# Patient Record
Sex: FEMALE | Employment: UNEMPLOYED | URBAN - METROPOLITAN AREA
[De-identification: names, ages, dates, MRNs, and addresses within clinical notes are randomized per-mention and may not be internally consistent; named-entity substitution may affect disease eponyms.]

---

## 2017-01-09 ENCOUNTER — TRANSFERRED RECORDS (OUTPATIENT)
Dept: HEALTH INFORMATION MANAGEMENT | Facility: CLINIC | Age: 22
End: 2017-01-09

## 2017-01-09 LAB
HIV 1&2 EXT: NORMAL
PAP-ABSTRACT: NORMAL

## 2019-07-17 ENCOUNTER — APPOINTMENT (OUTPATIENT)
Dept: CT IMAGING | Facility: CLINIC | Age: 24
End: 2019-07-17
Attending: EMERGENCY MEDICINE

## 2019-07-17 ENCOUNTER — HOSPITAL ENCOUNTER (EMERGENCY)
Facility: CLINIC | Age: 24
Discharge: HOME | End: 2019-07-17
Attending: EMERGENCY MEDICINE | Admitting: EMERGENCY MEDICINE

## 2019-07-17 VITALS
OXYGEN SATURATION: 97 % | HEIGHT: 66 IN | HEART RATE: 53 BPM | RESPIRATION RATE: 17 BRPM | TEMPERATURE: 99.4 F | WEIGHT: 220 LBS | DIASTOLIC BLOOD PRESSURE: 76 MMHG | BODY MASS INDEX: 35.36 KG/M2 | SYSTOLIC BLOOD PRESSURE: 137 MMHG

## 2019-07-17 DIAGNOSIS — K50.919 CROHN'S DISEASE WITH COMPLICATION, UNSPECIFIED GASTROINTESTINAL TRACT LOCATION (H): ICD-10-CM

## 2019-07-17 DIAGNOSIS — R10.84 ABDOMINAL PAIN, GENERALIZED: ICD-10-CM

## 2019-07-17 LAB
ALBUMIN SERPL-MCNC: 4 G/DL (ref 3.4–5)
ALBUMIN UR-MCNC: 10 MG/DL
ALP SERPL-CCNC: 128 U/L (ref 40–150)
ALT SERPL W P-5'-P-CCNC: 25 U/L (ref 0–50)
ANION GAP SERPL CALCULATED.3IONS-SCNC: 7 MMOL/L (ref 3–14)
APPEARANCE UR: CLEAR
AST SERPL W P-5'-P-CCNC: 12 U/L (ref 0–45)
BASOPHILS # BLD AUTO: 0 10E9/L (ref 0–0.2)
BASOPHILS NFR BLD AUTO: 0 %
BILIRUB SERPL-MCNC: 0.8 MG/DL (ref 0.2–1.3)
BILIRUB UR QL STRIP: NEGATIVE
BUN SERPL-MCNC: 9 MG/DL (ref 7–30)
CALCIUM SERPL-MCNC: 9.5 MG/DL (ref 8.5–10.1)
CHLORIDE SERPL-SCNC: 108 MMOL/L (ref 94–109)
CO2 SERPL-SCNC: 25 MMOL/L (ref 20–32)
COLOR UR AUTO: YELLOW
CREAT SERPL-MCNC: 0.71 MG/DL (ref 0.52–1.04)
DIFFERENTIAL METHOD BLD: NORMAL
EOSINOPHIL # BLD AUTO: 0 10E9/L (ref 0–0.7)
EOSINOPHIL NFR BLD AUTO: 0 %
ERYTHROCYTE [DISTWIDTH] IN BLOOD BY AUTOMATED COUNT: 14.6 % (ref 10–15)
GFR SERPL CREATININE-BSD FRML MDRD: >90 ML/MIN/{1.73_M2}
GLUCOSE SERPL-MCNC: 108 MG/DL (ref 70–99)
GLUCOSE UR STRIP-MCNC: NEGATIVE MG/DL
HCT VFR BLD AUTO: 37.9 % (ref 35–47)
HGB BLD-MCNC: 12.5 G/DL (ref 11.7–15.7)
HGB UR QL STRIP: NEGATIVE
IMM GRANULOCYTES # BLD: 0 10E9/L (ref 0–0.4)
IMM GRANULOCYTES NFR BLD: 0.3 %
KETONES UR STRIP-MCNC: NEGATIVE MG/DL
LEUKOCYTE ESTERASE UR QL STRIP: NEGATIVE
LIPASE SERPL-CCNC: 60 U/L (ref 73–393)
LYMPHOCYTES # BLD AUTO: 1.1 10E9/L (ref 0.8–5.3)
LYMPHOCYTES NFR BLD AUTO: 12.1 %
MCH RBC QN AUTO: 28.2 PG (ref 26.5–33)
MCHC RBC AUTO-ENTMCNC: 33 G/DL (ref 31.5–36.5)
MCV RBC AUTO: 85 FL (ref 78–100)
MONOCYTES # BLD AUTO: 0.4 10E9/L (ref 0–1.3)
MONOCYTES NFR BLD AUTO: 4.2 %
MUCOUS THREADS #/AREA URNS LPF: PRESENT /LPF
NEUTROPHILS # BLD AUTO: 7.9 10E9/L (ref 1.6–8.3)
NEUTROPHILS NFR BLD AUTO: 83.4 %
NITRATE UR QL: NEGATIVE
NRBC # BLD AUTO: 0 10*3/UL
NRBC BLD AUTO-RTO: 0 /100
PH UR STRIP: 6.5 PH (ref 5–7)
PLATELET # BLD AUTO: 322 10E9/L (ref 150–450)
POTASSIUM SERPL-SCNC: 4 MMOL/L (ref 3.4–5.3)
PROT SERPL-MCNC: 8 G/DL (ref 6.8–8.8)
RBC # BLD AUTO: 4.44 10E12/L (ref 3.8–5.2)
RBC #/AREA URNS AUTO: 1 /HPF (ref 0–2)
SODIUM SERPL-SCNC: 140 MMOL/L (ref 133–144)
SOURCE: ABNORMAL
SP GR UR STRIP: 1.02 (ref 1–1.03)
SQUAMOUS #/AREA URNS AUTO: 2 /HPF (ref 0–1)
UROBILINOGEN UR STRIP-MCNC: 2 MG/DL (ref 0–2)
WBC # BLD AUTO: 9.4 10E9/L (ref 4–11)
WBC #/AREA URNS AUTO: <1 /HPF (ref 0–5)

## 2019-07-17 PROCEDURE — 25500064 ZZH RX 255 OP 636: Performed by: EMERGENCY MEDICINE

## 2019-07-17 PROCEDURE — 99285 EMERGENCY DEPT VISIT HI MDM: CPT | Mod: 25

## 2019-07-17 PROCEDURE — 87506 IADNA-DNA/RNA PROBE TQ 6-11: CPT | Performed by: EMERGENCY MEDICINE

## 2019-07-17 PROCEDURE — 96375 TX/PRO/DX INJ NEW DRUG ADDON: CPT

## 2019-07-17 PROCEDURE — 83690 ASSAY OF LIPASE: CPT | Performed by: EMERGENCY MEDICINE

## 2019-07-17 PROCEDURE — 74177 CT ABD & PELVIS W/CONTRAST: CPT

## 2019-07-17 PROCEDURE — 25800030 ZZH RX IP 258 OP 636: Performed by: EMERGENCY MEDICINE

## 2019-07-17 PROCEDURE — 80053 COMPREHEN METABOLIC PANEL: CPT | Performed by: EMERGENCY MEDICINE

## 2019-07-17 PROCEDURE — 25000125 ZZHC RX 250: Performed by: EMERGENCY MEDICINE

## 2019-07-17 PROCEDURE — 96361 HYDRATE IV INFUSION ADD-ON: CPT

## 2019-07-17 PROCEDURE — 85025 COMPLETE CBC W/AUTO DIFF WBC: CPT | Performed by: EMERGENCY MEDICINE

## 2019-07-17 PROCEDURE — 96374 THER/PROPH/DIAG INJ IV PUSH: CPT | Mod: 59

## 2019-07-17 PROCEDURE — 25000128 H RX IP 250 OP 636: Performed by: EMERGENCY MEDICINE

## 2019-07-17 PROCEDURE — 81001 URINALYSIS AUTO W/SCOPE: CPT | Performed by: EMERGENCY MEDICINE

## 2019-07-17 RX ORDER — ONDANSETRON 2 MG/ML
4 INJECTION INTRAMUSCULAR; INTRAVENOUS EVERY 30 MIN PRN
Status: DISCONTINUED | OUTPATIENT
Start: 2019-07-17 | End: 2019-07-18 | Stop reason: HOSPADM

## 2019-07-17 RX ORDER — HYDROMORPHONE HYDROCHLORIDE 1 MG/ML
0.5 INJECTION, SOLUTION INTRAMUSCULAR; INTRAVENOUS; SUBCUTANEOUS
Status: COMPLETED | OUTPATIENT
Start: 2019-07-17 | End: 2019-07-17

## 2019-07-17 RX ORDER — SODIUM CHLORIDE 9 MG/ML
1000 INJECTION, SOLUTION INTRAVENOUS CONTINUOUS
Status: DISCONTINUED | OUTPATIENT
Start: 2019-07-17 | End: 2019-07-18 | Stop reason: HOSPADM

## 2019-07-17 RX ORDER — PROMETHAZINE HYDROCHLORIDE 25 MG/1
25 SUPPOSITORY RECTAL EVERY 6 HOURS PRN
Qty: 10 SUPPOSITORY | Refills: 0 | Status: SHIPPED | OUTPATIENT
Start: 2019-07-17 | End: 2020-08-25

## 2019-07-17 RX ORDER — ONDANSETRON 4 MG/1
4 TABLET, ORALLY DISINTEGRATING ORAL EVERY 4 HOURS PRN
Qty: 10 TABLET | Refills: 0 | Status: ON HOLD | OUTPATIENT
Start: 2019-07-17 | End: 2019-07-29

## 2019-07-17 RX ADMIN — ONDANSETRON 4 MG: 2 INJECTION INTRAMUSCULAR; INTRAVENOUS at 20:41

## 2019-07-17 RX ADMIN — HYDROMORPHONE HYDROCHLORIDE 0.5 MG: 1 INJECTION, SOLUTION INTRAMUSCULAR; INTRAVENOUS; SUBCUTANEOUS at 20:41

## 2019-07-17 RX ADMIN — SODIUM CHLORIDE 1000 ML: 9 INJECTION, SOLUTION INTRAVENOUS at 20:41

## 2019-07-17 RX ADMIN — SODIUM CHLORIDE 75 ML: 9 INJECTION, SOLUTION INTRAVENOUS at 22:09

## 2019-07-17 RX ADMIN — PROMETHAZINE HYDROCHLORIDE 25 MG: 25 INJECTION INTRAMUSCULAR; INTRAVENOUS at 21:51

## 2019-07-17 RX ADMIN — SODIUM CHLORIDE 1000 ML: 9 INJECTION, SOLUTION INTRAVENOUS at 21:50

## 2019-07-17 RX ADMIN — IOHEXOL 117 ML: 350 INJECTION, SOLUTION INTRAVENOUS at 22:11

## 2019-07-17 ASSESSMENT — ENCOUNTER SYMPTOMS
NAUSEA: 1
DIARRHEA: 1
ABDOMINAL PAIN: 1
VOMITING: 1

## 2019-07-17 ASSESSMENT — MIFFLIN-ST. JEOR: SCORE: 1769.66

## 2019-07-17 NOTE — ED AVS SNAPSHOT
Emergency Department  [address] [place] [address]  Phone:  [phone]  Fax:  [phone]                                    Tami Almaraz   MRN: [id number]    Department:   Emergency Department   Date of Visit:  7/17/2019           After Visit Summary Signature Page    I have received my discharge instructions, and my questions have been answered. I have discussed any challenges I see with this plan with the nurse or doctor.    ..........................................................................................................................................  Patient/Patient Representative Signature      ..........................................................................................................................................  Patient Representative Print Name and Relationship to Patient    ..................................................               ................................................  Date                                   Time    ..........................................................................................................................................  Reviewed by Signature/Title    ...................................................              ..............................................  Date                                               Time          22EPIC Rev 08/18

## 2019-07-18 NOTE — RESULT ENCOUNTER NOTE
Final Enteric Bacteria and Virus Panel by [facility] Stool is NEGATIVE for Campylobacter group, Salmonella species, Shigella species, Shiga toxin 1 gene, Shiga toxin 2 gene, Vibrio group,  Yersinia enterocolitica,  Rotavirus A,  and Norovirus I and II.    No treatment or change in treatment per [facility] Lab Result protocol.

## 2019-07-18 NOTE — ED PROVIDER NOTES
History     Chief Complaint:  Abdominal Pain      HPI   Tami Almaraz is a 23 year old female with history of Crohn's disease not currently medicated who presents to the emergency department today for evaluation of abdominal pain. The patient reports for the past week she has had upper abdominal pain, nausea, vomiting, and diarrhea. She says she has ten episodes of diarrhea per day. She reports she hasn't been able to keep any fluids down, or her Tylenol. She says this is worse than her regular bouts with Crohn's disease. Due to these worsening symptoms she presented to the emergency department today. Additionally, she reports she recently had a child  but no other surgeries. Her last menstrual cycles ended yesterday which is regular timing for her. She denies vaginal discharge. Of note, she recently moved to [place] from [place].       Allergies:  Compazine [Prochlorperazine]  Morphine  Reglan [Metoclopramide]  Toradol [Ketorolac]  Vicodin [Hydrocodone-Acetaminophen]  Zyprexa [Olanzapine]        Medications:    The patient is currently on no regular medications.     Past Medical History:    Crohn's disease    Heavy set white supein, uncomret  orophary is moist  Belly: diffuse upper abdom tenrn, no guar no reboun, nOvv ea 1+femoral pulse     Past Surgical History:    C section    Family History:    History reviewed. No pertinent family history.        Social History:  The patient was accompanied to the ED by herself.  Smoking Status: Never Smoker  Smokeless Tobacco: Never Used  Alcohol Use: Not currently   Marital Status:  Single       Review of Systems   Gastrointestinal: Positive for abdominal pain (upper), diarrhea, nausea and vomiting.   Genitourinary: Negative for vaginal discharge.   All other systems reviewed and are negative.        Physical Exam     Patient Vitals for the past 24 hrs:   BP Temp Temp src Pulse Resp SpO2 Height Weight   19 2125 (!) 135/91 -- -- 51 -- 98 % -- --   19  "132/87 99.4  F (37.4  C) Oral 59 17 99 % 1.676 m (5' 6\") 99.8 kg (220 lb)        Physical Exam  Constitutional: Heavy set, white female, supine. Uncomfortable appearing.   HENT: Oropharynx is moist. No signs of trauma.   Eyes: EOM are normal. Pupils are equal, round, and reactive to light.   Neck: Normal range of motion. No JVD present. No cervical adenopathy.  Cardiovascular: Regular rhythm.  Exam reveals no gallop and no friction rub.    No murmur heard.  Pulmonary/Chest: 1+ Femoral pulse. Bilateral breath sounds normal. No wheezes, rhonchi or rales.  Abdominal: Diffuse upper abdominal tenderness. Soft. No rebound or guarding. bowel sounds active  Musculoskeletal: No edema. No tenderness.   Lymphadenopathy: No lymphadenopathy.   Neurological: Alert and oriented to person, place, and time. Normal strength. Coordination normal.   Skin: Skin is warm and dry. No rash noted. No erythema.      Emergency Department Course     Imaging:  Radiology findings were communicated with the patient who voiced understanding of the findings.    CT Abdomen/Pelvis w Contrast:  IMPRESSION: No cause of acute pain identified in the abdomen or  pelvis. The appendix is unremarkable  Report per radiology       Laboratory:  Laboratory findings were communicated with the patient who voiced understanding of the findings.    CBC: WBC 9.4, HGB 12.5,    CMP: Glucose 108   Lipase: 60     UA: Yellow and Clear. Protein Albumin Urine 10, WBC/HPF <1, RBC/HPF 1, Squamous Epithelial/HPF Urine 2, Mucous urine Present o/w WNL      Enteric Bacteria and Virus Panel by [facility] Stool: Pending      Interventions:  2041 Zofran 4mg IV   2041 Hydromorphone 0.5 mg IV  2151 Phenergan 25 mg IV         Emergency Department Course:  Nursing notes and vitals reviewed.  2028: I performed an exam of the patient as documented above.    IV was inserted and blood was drawn for laboratory testing, results above.    The patient provided a urine sample here in the emergency " "department. This was sent for laboratory testing, findings above.    The patient was sent for a CT Abdomen Pelvis while in the emergency department, results above.      2300 Patient rechecked and updated.      Findings and plan explained to the Patient. Patient discharged home with instructions regarding supportive care, medications, and reasons to return. The importance of close follow-up was reviewed.   I personally reviewed the laboratory and imaging results with the Patient and answered all related questions prior to discharge.   Impression & Plan      Medical Decision Making:  This is a 23 year old female who presents with abdominal pain. She states for at least a week she has been having abdominal pain more in the upper area, nausea, and vomiting, and unable to keep anything down including her Tylenol medication. She states she has had perfuse diarrhea so many times a day that she can't count. She is two months after delivery of her baby, and she states she has known Crohn's disease, and has never had surgery for this and isn't currently on medication. She just moved here from [place] one month ago. On my exam, there is some diffuse upper abdominal tenderness but no guarding or rebound to suggest a surgical abdomen. Patient holds an emesis bag, and yet we haven't seen any vomiting. She also states she has perfuse diarrhea, but only went to the bathroom once overly the several hours she was here. Patient has a long list of allergies to medications. She states she is able to tolerate phenergan and only the \"D\" pain medicine works for her. Patient did recieve IV fluids, Zofran, phenergan, and a single dose of dilaudid. Patient continued to complain of some nausea and discomfort, and was only able to take some ice chips. However, her entire workup including blood, urine, and CT scan are unremarkable. This well could be some type of vomiting and diarrhea, however given the inconsistency in what she is reporting and " what we are observing, an her specificity in request a certain medication I am concerned about drug seeking. I have told patient at this time there is nothing more we can do. She isn't dehydrated, she isn't septic, she doesn't have any intraabdominal problems. I will give her prescriptions phenergan suppository and oral dissolving Zofran, I referred her to Dr. Rouse. She will make a follow up, and will return to emergency department if symptoms worsen.    Diagnosis:    ICD-10-CM    1. Abdominal pain, generalized R10.84    2. Crohn's disease with complication, unspecified gastrointestinal tract location (H) K50.919        Disposition:  Discharged to home with the below prescription.     Discharge Medications:     Medication List      Started    ondansetron 4 MG ODT tab  Commonly known as:  ZOFRAN ODT  4 mg, Oral, EVERY 4 HOURS PRN     promethazine 25 MG suppository  Commonly known as:  PHENERGAN  25 mg, Rectal, EVERY 6 HOURS PRN            Scribe Disclosure:  I, Carly Ashton, am serving as a scribe at 8:33 PM on 7/17/2019 to document services personally performed by Charlie Nguyen MD based on my observations and the provider's statements to me.    Carly Ashton  7/17/2019    EMERGENCY DEPARTMENT       Charlie Nguyen MD  07/17/19 [id number]

## 2019-07-28 ENCOUNTER — HOSPITAL ENCOUNTER (OUTPATIENT)
Facility: CLINIC | Age: 24
Setting detail: OBSERVATION
Discharge: HOME | End: 2019-07-29
Attending: EMERGENCY MEDICINE | Admitting: FAMILY MEDICINE

## 2019-07-28 DIAGNOSIS — R10.9 ABDOMINAL PAIN, UNSPECIFIED ABDOMINAL LOCATION: Primary | ICD-10-CM

## 2019-07-28 DIAGNOSIS — R10.84 ABDOMINAL PAIN, GENERALIZED: ICD-10-CM

## 2019-07-28 DIAGNOSIS — K50.90 CROHN'S DISEASE WITHOUT COMPLICATION, UNSPECIFIED GASTROINTESTINAL TRACT LOCATION (H): ICD-10-CM

## 2019-07-28 LAB
ALBUMIN SERPL-MCNC: 3.8 G/DL (ref 3.4–5)
ALP SERPL-CCNC: 136 U/L (ref 40–150)
ALT SERPL W P-5'-P-CCNC: 39 U/L (ref 0–50)
ANION GAP SERPL CALCULATED.3IONS-SCNC: 4 MMOL/L (ref 3–14)
AST SERPL W P-5'-P-CCNC: 48 U/L (ref 0–45)
BASOPHILS # BLD AUTO: 0 10E9/L (ref 0–0.2)
BASOPHILS NFR BLD AUTO: 0.3 %
BILIRUB SERPL-MCNC: 0.8 MG/DL (ref 0.2–1.3)
BUN SERPL-MCNC: 7 MG/DL (ref 7–30)
CALCIUM SERPL-MCNC: 9 MG/DL (ref 8.5–10.1)
CHLORIDE SERPL-SCNC: 107 MMOL/L (ref 94–109)
CO2 SERPL-SCNC: 26 MMOL/L (ref 20–32)
CREAT SERPL-MCNC: 0.51 MG/DL (ref 0.52–1.04)
CRP SERPL-MCNC: 5.8 MG/L (ref 0–8)
DIFFERENTIAL METHOD BLD: ABNORMAL
EOSINOPHIL # BLD AUTO: 0 10E9/L (ref 0–0.7)
EOSINOPHIL NFR BLD AUTO: 0 %
ERYTHROCYTE [DISTWIDTH] IN BLOOD BY AUTOMATED COUNT: 14.1 % (ref 10–15)
ERYTHROCYTE [SEDIMENTATION RATE] IN BLOOD BY WESTERGREN METHOD: 16 MM/H (ref 0–20)
GFR SERPL CREATININE-BSD FRML MDRD: >90 ML/MIN/{1.73_M2}
GLUCOSE SERPL-MCNC: 113 MG/DL (ref 70–99)
HCG SERPL QL: NEGATIVE
HCT VFR BLD AUTO: 39.5 % (ref 35–47)
HGB BLD-MCNC: 12.3 G/DL (ref 11.7–15.7)
IMM GRANULOCYTES # BLD: 0 10E9/L (ref 0–0.4)
IMM GRANULOCYTES NFR BLD: 0.6 %
LACTATE BLD-SCNC: 0.9 MMOL/L (ref 0.7–2)
LIPASE SERPL-CCNC: 47 U/L (ref 73–393)
LYMPHOCYTES # BLD AUTO: 0.9 10E9/L (ref 0.8–5.3)
LYMPHOCYTES NFR BLD AUTO: 14.1 %
MCH RBC QN AUTO: 27.3 PG (ref 26.5–33)
MCHC RBC AUTO-ENTMCNC: 31.1 G/DL (ref 31.5–36.5)
MCV RBC AUTO: 88 FL (ref 78–100)
MONOCYTES # BLD AUTO: 0.1 10E9/L (ref 0–1.3)
MONOCYTES NFR BLD AUTO: 1.6 %
NEUTROPHILS # BLD AUTO: 5.3 10E9/L (ref 1.6–8.3)
NEUTROPHILS NFR BLD AUTO: 83.4 %
NRBC # BLD AUTO: 0 10*3/UL
NRBC BLD AUTO-RTO: 0 /100
PLATELET # BLD AUTO: 302 10E9/L (ref 150–450)
POTASSIUM SERPL-SCNC: 5.4 MMOL/L (ref 3.4–5.3)
PROT SERPL-MCNC: 7.8 G/DL (ref 6.8–8.8)
RBC # BLD AUTO: 4.51 10E12/L (ref 3.8–5.2)
SODIUM SERPL-SCNC: 136 MMOL/L (ref 133–144)
WBC # BLD AUTO: 6.3 10E9/L (ref 4–11)

## 2019-07-28 PROCEDURE — 25000128 H RX IP 250 OP 636: Performed by: EMERGENCY MEDICINE

## 2019-07-28 PROCEDURE — 83605 ASSAY OF LACTIC ACID: CPT | Performed by: EMERGENCY MEDICINE

## 2019-07-28 PROCEDURE — 80053 COMPREHEN METABOLIC PANEL: CPT | Performed by: EMERGENCY MEDICINE

## 2019-07-28 PROCEDURE — 85025 COMPLETE CBC W/AUTO DIFF WBC: CPT | Performed by: EMERGENCY MEDICINE

## 2019-07-28 PROCEDURE — 25800030 ZZH RX IP 258 OP 636: Performed by: EMERGENCY MEDICINE

## 2019-07-28 PROCEDURE — 99284 EMERGENCY DEPT VISIT MOD MDM: CPT | Mod: Z6 | Performed by: EMERGENCY MEDICINE

## 2019-07-28 PROCEDURE — 85652 RBC SED RATE AUTOMATED: CPT | Performed by: EMERGENCY MEDICINE

## 2019-07-28 PROCEDURE — 83690 ASSAY OF LIPASE: CPT | Performed by: EMERGENCY MEDICINE

## 2019-07-28 PROCEDURE — 99285 EMERGENCY DEPT VISIT HI MDM: CPT | Mod: 25 | Performed by: EMERGENCY MEDICINE

## 2019-07-28 PROCEDURE — 86140 C-REACTIVE PROTEIN: CPT | Performed by: EMERGENCY MEDICINE

## 2019-07-28 PROCEDURE — 96374 THER/PROPH/DIAG INJ IV PUSH: CPT | Performed by: EMERGENCY MEDICINE

## 2019-07-28 PROCEDURE — 84703 CHORIONIC GONADOTROPIN ASSAY: CPT | Performed by: EMERGENCY MEDICINE

## 2019-07-28 PROCEDURE — 96375 TX/PRO/DX INJ NEW DRUG ADDON: CPT | Performed by: EMERGENCY MEDICINE

## 2019-07-28 RX ORDER — ACETAMINOPHEN 500 MG
1000 TABLET ORAL ONCE
Status: DISCONTINUED | OUTPATIENT
Start: 2019-07-28 | End: 2019-07-29

## 2019-07-28 RX ORDER — SODIUM CHLORIDE, SODIUM LACTATE, POTASSIUM CHLORIDE, CALCIUM CHLORIDE 600; 310; 30; 20 MG/100ML; MG/100ML; MG/100ML; MG/100ML
INJECTION, SOLUTION INTRAVENOUS CONTINUOUS
Status: DISCONTINUED | OUTPATIENT
Start: 2019-07-28 | End: 2019-07-29

## 2019-07-28 RX ORDER — HYDROMORPHONE HYDROCHLORIDE 1 MG/ML
0.5 INJECTION, SOLUTION INTRAMUSCULAR; INTRAVENOUS; SUBCUTANEOUS
Status: COMPLETED | OUTPATIENT
Start: 2019-07-28 | End: 2019-07-28

## 2019-07-28 RX ORDER — ONDANSETRON 2 MG/ML
4 INJECTION INTRAMUSCULAR; INTRAVENOUS EVERY 30 MIN PRN
Status: DISCONTINUED | OUTPATIENT
Start: 2019-07-28 | End: 2019-07-29

## 2019-07-28 RX ADMIN — ONDANSETRON 4 MG: 2 INJECTION INTRAMUSCULAR; INTRAVENOUS at 23:16

## 2019-07-28 RX ADMIN — HYDROMORPHONE HYDROCHLORIDE 0.5 MG: 1 INJECTION, SOLUTION INTRAMUSCULAR; INTRAVENOUS; SUBCUTANEOUS at 23:16

## 2019-07-28 RX ADMIN — SODIUM CHLORIDE, POTASSIUM CHLORIDE, SODIUM LACTATE AND CALCIUM CHLORIDE: 600; 310; 30; 20 INJECTION, SOLUTION INTRAVENOUS at 23:15

## 2019-07-28 ASSESSMENT — MIFFLIN-ST. JEOR: SCORE: 1844.51

## 2019-07-29 ENCOUNTER — APPOINTMENT (OUTPATIENT)
Dept: ULTRASOUND IMAGING | Facility: CLINIC | Age: 24
End: 2019-07-29
Attending: EMERGENCY MEDICINE

## 2019-07-29 VITALS
RESPIRATION RATE: 16 BRPM | SYSTOLIC BLOOD PRESSURE: 117 MMHG | TEMPERATURE: 98.2 F | BODY MASS INDEX: 39.99 KG/M2 | WEIGHT: 240 LBS | HEART RATE: 56 BPM | HEIGHT: 65 IN | OXYGEN SATURATION: 96 % | DIASTOLIC BLOOD PRESSURE: 72 MMHG

## 2019-07-29 PROBLEM — R10.9 ABDOMINAL PAIN: Status: ACTIVE | Noted: 2019-07-29

## 2019-07-29 LAB
ALBUMIN SERPL-MCNC: 3.6 G/DL (ref 3.4–5)
ALBUMIN UR-MCNC: NEGATIVE MG/DL
ALP SERPL-CCNC: 116 U/L (ref 40–150)
ALT SERPL W P-5'-P-CCNC: 31 U/L (ref 0–50)
ANION GAP SERPL CALCULATED.3IONS-SCNC: 9 MMOL/L (ref 3–14)
APPEARANCE UR: CLEAR
AST SERPL W P-5'-P-CCNC: 15 U/L (ref 0–45)
BACTERIA #/AREA URNS HPF: ABNORMAL /HPF
BASOPHILS # BLD AUTO: 0 10E9/L (ref 0–0.2)
BASOPHILS NFR BLD AUTO: 0.3 %
BILIRUB SERPL-MCNC: 1.6 MG/DL (ref 0.2–1.3)
BILIRUB UR QL STRIP: NEGATIVE
BUN SERPL-MCNC: 7 MG/DL (ref 7–30)
C COLI+JEJUNI+LARI FUSA STL QL NAA+PROBE: NOT DETECTED
C DIFF TOX B STL QL: NEGATIVE
CALCIUM SERPL-MCNC: 8.9 MG/DL (ref 8.5–10.1)
CALPROTECTIN STL-MCNT: 42.8 MG/KG (ref 0–49.9)
CHLORIDE SERPL-SCNC: 106 MMOL/L (ref 94–109)
CO2 SERPL-SCNC: 25 MMOL/L (ref 20–32)
COLOR UR AUTO: ABNORMAL
CREAT SERPL-MCNC: 0.64 MG/DL (ref 0.52–1.04)
DIFFERENTIAL METHOD BLD: ABNORMAL
EC STX1 GENE STL QL NAA+PROBE: NOT DETECTED
EC STX2 GENE STL QL NAA+PROBE: NOT DETECTED
ENTERIC PATHOGEN COMMENT: NORMAL
EOSINOPHIL # BLD AUTO: 0 10E9/L (ref 0–0.7)
EOSINOPHIL NFR BLD AUTO: 0.1 %
ERYTHROCYTE [DISTWIDTH] IN BLOOD BY AUTOMATED COUNT: 14.2 % (ref 10–15)
GFR SERPL CREATININE-BSD FRML MDRD: >90 ML/MIN/{1.73_M2}
GLUCOSE SERPL-MCNC: 90 MG/DL (ref 70–99)
GLUCOSE UR STRIP-MCNC: NEGATIVE MG/DL
HCT VFR BLD AUTO: 36.3 % (ref 35–47)
HEMOCCULT STL QL: NEGATIVE
HGB BLD-MCNC: 11.4 G/DL (ref 11.7–15.7)
HGB UR QL STRIP: NEGATIVE
IMM GRANULOCYTES # BLD: 0 10E9/L (ref 0–0.4)
IMM GRANULOCYTES NFR BLD: 0.5 %
INTERNAL QC OK POCT: YES
KETONES UR STRIP-MCNC: NEGATIVE MG/DL
LEUKOCYTE ESTERASE UR QL STRIP: ABNORMAL
LYMPHOCYTES # BLD AUTO: 1.7 10E9/L (ref 0.8–5.3)
LYMPHOCYTES NFR BLD AUTO: 22 %
MCH RBC QN AUTO: 27.2 PG (ref 26.5–33)
MCHC RBC AUTO-ENTMCNC: 31.4 G/DL (ref 31.5–36.5)
MCV RBC AUTO: 87 FL (ref 78–100)
MONOCYTES # BLD AUTO: 0.5 10E9/L (ref 0–1.3)
MONOCYTES NFR BLD AUTO: 6.8 %
MUCOUS THREADS #/AREA URNS LPF: PRESENT /LPF
NEUTROPHILS # BLD AUTO: 5.3 10E9/L (ref 1.6–8.3)
NEUTROPHILS NFR BLD AUTO: 70.3 %
NITRATE UR QL: NEGATIVE
NOROV GI+II ORF1-ORF2 JNC STL QL NAA+PR: NOT DETECTED
NRBC # BLD AUTO: 0 10*3/UL
NRBC BLD AUTO-RTO: 0 /100
PH UR STRIP: 6.5 PH (ref 5–7)
PLATELET # BLD AUTO: 305 10E9/L (ref 150–450)
POTASSIUM SERPL-SCNC: 3.4 MMOL/L (ref 3.4–5.3)
PROT SERPL-MCNC: 7.1 G/DL (ref 6.8–8.8)
RBC # BLD AUTO: 4.19 10E12/L (ref 3.8–5.2)
RBC #/AREA URNS AUTO: <1 /HPF (ref 0–2)
RVA NSP5 STL QL NAA+PROBE: NOT DETECTED
SALMONELLA SP RPOD STL QL NAA+PROBE: NOT DETECTED
SHIGELLA SP+EIEC IPAH STL QL NAA+PROBE: NOT DETECTED
SODIUM SERPL-SCNC: 140 MMOL/L (ref 133–144)
SOURCE: ABNORMAL
SP GR UR STRIP: 1.01 (ref 1–1.03)
SPECIMEN SOURCE: NORMAL
SQUAMOUS #/AREA URNS AUTO: 4 /HPF (ref 0–1)
TEST CARD LOT NUMBER: NORMAL
TRANS CELLS #/AREA URNS HPF: <1 /HPF (ref 0–1)
UROBILINOGEN UR STRIP-MCNC: NORMAL MG/DL (ref 0–2)
V CHOL+PARA RFBL+TRKH+TNAA STL QL NAA+PR: NOT DETECTED
WBC # BLD AUTO: 7.5 10E9/L (ref 4–11)
WBC #/AREA URNS AUTO: <1 /HPF (ref 0–5)
Y ENTERO RECN STL QL NAA+PROBE: NOT DETECTED

## 2019-07-29 PROCEDURE — 25000132 ZZH RX MED GY IP 250 OP 250 PS 637: Performed by: EMERGENCY MEDICINE

## 2019-07-29 PROCEDURE — 83993 ASSAY FOR CALPROTECTIN FECAL: CPT | Performed by: STUDENT IN AN ORGANIZED HEALTH CARE EDUCATION/TRAINING PROGRAM

## 2019-07-29 PROCEDURE — 85025 COMPLETE CBC W/AUTO DIFF WBC: CPT | Performed by: STUDENT IN AN ORGANIZED HEALTH CARE EDUCATION/TRAINING PROGRAM

## 2019-07-29 PROCEDURE — 25000128 H RX IP 250 OP 636: Performed by: STUDENT IN AN ORGANIZED HEALTH CARE EDUCATION/TRAINING PROGRAM

## 2019-07-29 PROCEDURE — 25000132 ZZH RX MED GY IP 250 OP 250 PS 637: Performed by: STUDENT IN AN ORGANIZED HEALTH CARE EDUCATION/TRAINING PROGRAM

## 2019-07-29 PROCEDURE — G0378 HOSPITAL OBSERVATION PER HR: HCPCS

## 2019-07-29 PROCEDURE — 80053 COMPREHEN METABOLIC PANEL: CPT | Performed by: STUDENT IN AN ORGANIZED HEALTH CARE EDUCATION/TRAINING PROGRAM

## 2019-07-29 PROCEDURE — 87493 C DIFF AMPLIFIED PROBE: CPT | Performed by: EMERGENCY MEDICINE

## 2019-07-29 PROCEDURE — 96376 TX/PRO/DX INJ SAME DRUG ADON: CPT

## 2019-07-29 PROCEDURE — 82272 OCCULT BLD FECES 1-3 TESTS: CPT | Performed by: STUDENT IN AN ORGANIZED HEALTH CARE EDUCATION/TRAINING PROGRAM

## 2019-07-29 PROCEDURE — 81001 URINALYSIS AUTO W/SCOPE: CPT | Performed by: EMERGENCY MEDICINE

## 2019-07-29 PROCEDURE — 36415 COLL VENOUS BLD VENIPUNCTURE: CPT | Performed by: STUDENT IN AN ORGANIZED HEALTH CARE EDUCATION/TRAINING PROGRAM

## 2019-07-29 PROCEDURE — 87506 IADNA-DNA/RNA PROBE TQ 6-11: CPT | Performed by: STUDENT IN AN ORGANIZED HEALTH CARE EDUCATION/TRAINING PROGRAM

## 2019-07-29 PROCEDURE — 76705 ECHO EXAM OF ABDOMEN: CPT

## 2019-07-29 RX ORDER — AMOXICILLIN 250 MG
2 CAPSULE ORAL 2 TIMES DAILY PRN
Status: DISCONTINUED | OUTPATIENT
Start: 2019-07-29 | End: 2019-07-29 | Stop reason: HOSPADM

## 2019-07-29 RX ORDER — NALOXONE HYDROCHLORIDE 0.4 MG/ML
.1-.4 INJECTION, SOLUTION INTRAMUSCULAR; INTRAVENOUS; SUBCUTANEOUS
Status: DISCONTINUED | OUTPATIENT
Start: 2019-07-29 | End: 2019-07-29 | Stop reason: HOSPADM

## 2019-07-29 RX ORDER — ACETAMINOPHEN 325 MG/1
650 TABLET ORAL EVERY 4 HOURS PRN
Status: DISCONTINUED | OUTPATIENT
Start: 2019-07-29 | End: 2019-07-29 | Stop reason: HOSPADM

## 2019-07-29 RX ORDER — ACETAMINOPHEN 650 MG/1
650 SUPPOSITORY RECTAL EVERY 4 HOURS PRN
Status: DISCONTINUED | OUTPATIENT
Start: 2019-07-29 | End: 2019-07-29 | Stop reason: HOSPADM

## 2019-07-29 RX ORDER — POLYETHYLENE GLYCOL 3350 17 G/17G
17 POWDER, FOR SOLUTION ORAL DAILY PRN
Status: DISCONTINUED | OUTPATIENT
Start: 2019-07-29 | End: 2019-07-29 | Stop reason: HOSPADM

## 2019-07-29 RX ORDER — PROMETHAZINE HYDROCHLORIDE 25 MG/1
25 TABLET ORAL EVERY 6 HOURS PRN
Status: DISCONTINUED | OUTPATIENT
Start: 2019-07-29 | End: 2019-07-29 | Stop reason: HOSPADM

## 2019-07-29 RX ORDER — AMOXICILLIN 250 MG
1 CAPSULE ORAL 2 TIMES DAILY PRN
Status: DISCONTINUED | OUTPATIENT
Start: 2019-07-29 | End: 2019-07-29 | Stop reason: HOSPADM

## 2019-07-29 RX ORDER — BISACODYL 10 MG
10 SUPPOSITORY, RECTAL RECTAL DAILY PRN
Status: DISCONTINUED | OUTPATIENT
Start: 2019-07-29 | End: 2019-07-29 | Stop reason: HOSPADM

## 2019-07-29 RX ORDER — ONDANSETRON 2 MG/ML
4 INJECTION INTRAMUSCULAR; INTRAVENOUS EVERY 6 HOURS PRN
Status: DISCONTINUED | OUTPATIENT
Start: 2019-07-29 | End: 2019-07-29 | Stop reason: HOSPADM

## 2019-07-29 RX ORDER — LIDOCAINE 40 MG/G
CREAM TOPICAL
Status: DISCONTINUED | OUTPATIENT
Start: 2019-07-29 | End: 2019-07-29 | Stop reason: HOSPADM

## 2019-07-29 RX ORDER — DIPHENHYDRAMINE HCL 12.5MG/5ML
25 LIQUID (ML) ORAL ONCE
Status: COMPLETED | OUTPATIENT
Start: 2019-07-29 | End: 2019-07-29

## 2019-07-29 RX ORDER — ONDANSETRON 4 MG/1
4 TABLET, ORALLY DISINTEGRATING ORAL EVERY 4 HOURS PRN
Qty: 10 TABLET | Refills: 0 | Status: SHIPPED | OUTPATIENT
Start: 2019-07-29 | End: 2020-08-25

## 2019-07-29 RX ORDER — ONDANSETRON 4 MG/1
4 TABLET, ORALLY DISINTEGRATING ORAL EVERY 6 HOURS PRN
Status: DISCONTINUED | OUTPATIENT
Start: 2019-07-29 | End: 2019-07-29 | Stop reason: HOSPADM

## 2019-07-29 RX ORDER — DICYCLOMINE HYDROCHLORIDE 10 MG/1
10 CAPSULE ORAL 4 TIMES DAILY
Status: DISCONTINUED | OUTPATIENT
Start: 2019-07-29 | End: 2019-07-29 | Stop reason: HOSPADM

## 2019-07-29 RX ORDER — HYDROMORPHONE HCL/0.9% NACL/PF 0.2MG/0.2
0.2 SYRINGE (ML) INTRAVENOUS EVERY 4 HOURS PRN
Status: DISCONTINUED | OUTPATIENT
Start: 2019-07-29 | End: 2019-07-29 | Stop reason: HOSPADM

## 2019-07-29 RX ADMIN — Medication 0.2 MG: at 05:35

## 2019-07-29 RX ADMIN — SODIUM CHLORIDE 1000 ML: 9 INJECTION, SOLUTION INTRAVENOUS at 05:53

## 2019-07-29 RX ADMIN — DIPHENHYDRAMINE HYDROCHLORIDE 25 MG: 25 SOLUTION ORAL at 01:12

## 2019-07-29 RX ADMIN — Medication 0.2 MG: at 09:29

## 2019-07-29 RX ADMIN — DICYCLOMINE HYDROCHLORIDE 10 MG: 10 CAPSULE ORAL at 09:29

## 2019-07-29 ASSESSMENT — ENCOUNTER SYMPTOMS
BACK PAIN: 0
BLOOD IN STOOL: 1
DYSURIA: 0
SHORTNESS OF BREATH: 0
NAUSEA: 1
HEADACHES: 1
CHILLS: 0
ABDOMINAL PAIN: 1
SORE THROAT: 0
FREQUENCY: 0
DIARRHEA: 1
FEVER: 1
VOMITING: 1
DIFFICULTY URINATING: 0

## 2019-07-29 NOTE — DISCHARGE SUMMARY
"[facility], [facility] Discharge Summary- Lexi  Service    Date of Admission:  7/28/2019  Date of Service: 7/29/2019  Date of Discharge:  7/29/2019  Discharging Attending Provider: Dr. Conway  Discharge Team: [facility]    Discharge Diagnoses   Functional abdominal pain    Follow-ups Needed After Discharge     PCP to f/u pending labs at discharge (enteric panel)    Patient to continue f/u with [facility] in August.    Hospital Course   Tami Almaraz was admitted on 7/28/2019 for bloody diarrhea, resolved since admission to hospital. Patient reports prior diagnosis of Crohn's with concern for Crohn's flare. Per patient, diagnosis 4 years ago in [place], and she was previously on Azathioprine but has now been off of this. Unable to find documentation of this diagnosis in her available records in EMR. Patient did not have further episodes of observed bloody stools since admission despite reports of continued bloody diarrhea. Stool guaiac was negative for blood, and review of CT abdomen on 7/17 from [facility] was unremarkable. U/S abdomen was performed on this admission which was also negative for concerning findings. While here, her labs were reassuring including normal CBC, CMP, FOBT, lactic acid, CRP, fecal calprotectin, C difficile PCR, and UA. Overall, little concern for IBD or acute infectious dysentery. Patient did exhibit few concerning behaviours while here, including requesting \"the D-medication that works for the pain\" and reports of intolerance to compazine, reglan for nausea but \"ativan works well for nausea.\" Ultimately, patient remained HDS, was reassured of the benign nature of her findings, and requested early discharge from the hospital.     The following problems were addressed during her hospitalization:    # Abdominal Pain  # Nausea, vomiting  # Bloody diarrhea (subjective reports)  # Reported hx of Crohn's  History per above. No " objective finding to indicate clear source of pain. Most likely functional abdominal pain. Would recommend against opioid medications for treatment as it does not appear to improve patient's pain significantly and would likely exacerbates dependence/pain rebound. Did discuss with patient potentially trying bentyl. Ultimately did not discharge with this.  - Enteric pending at time of discharge. Recommend establishing with PCP in [place] to follow.   - Follow-up with [place] [facility] as previously scheduled.     #Elevated BP without diagnosis of HTN  Outside the window for post partum preeclampsia. Mild range pressure in 140s while here.   - Recommend outpatient monitoring, starting anti-hypertensives per PCP.    # Discharge Pain Plan:   - During her hospitalization, Tami experienced pain due to abdominal pain.  The pain plan for discharge was discussed with Tami and the plan was created in a collaborative fashion though patient disagrees with our plan to not treat with higher dose of dilaudid than was currently available on a prn basis during hospitalization.     Consultations This Hospital Stay   None    Code Status   Full Code       The patient was discussed with Dr. [facility] In[facility]  [facility]   Pager:[phone]_  ___________________________________________________________________  Review of Systems:  CONSTITUTIONAL: NEGATIVE for fever, chills, change in weight  ENT/MOUTH: NEGATIVE for ear, mouth and throat problems  RESP: NEGATIVE for significant cough or SOB  CV: NEGATIVE for chest pain, palpitations or peripheral edema  GI: Positive for abdominal pain, nausea.  : NEGATIVE for frequency, dysuria, or hematuria  MUSCULOSKELETAL: NEGATIVE for significant arthralgias or myalgia  NEURO: NEGATIVE for weakness, dizziness or paresthesias  Physical Exam   Vital Signs: Temp: 98.2  F (36.8  C) Temp src: Oral BP: 117/72 Pulse: 56 Heart Rate: 52 Resp: 16  SpO2: 96 % O2 Device: None (Room air)    Weight: 240 lbs 0 oz    General Appearance: Sleeping on first exam. Falls back asleep during interview.   Respiratory: CTA to bases. No respiratory distress. No wheeze, rales.  Cardiovascular: RRR, S1/S2 normal.  GI: Soft, tenderness around umbilicus. Carnett's negative. No mass, distension, guarding.  Skin: 1 small ecchymosis over lower shin on R 2/2 recent injury (bump against furniture)  Other: Irritated.    Significant Results and Procedures   Results for orders placed or performed during the hospital encounter of 07/28/19   US Abdomen Limited (RUQ)    Narrative    EXAMINATION: US ABDOMEN LIMITED  7/29/2019 1:30 AM      CLINICAL HISTORY: upper abd pain    COMPARISON: CT abdomen 7/17/2019.        FINDINGS:  The liver is normal in contour and echogenicity (measures 16 cm).  There is no intrahepatic or extrahepatic biliary ductal dilatation.  The common bile duct measures 5 mm. The gallbladder is normal, without  gallstones, wall thickening, or pericholecystic fluid.    Right kidney measures 10 cm. Normal echogenicity without urinary tract  dilatation. No echogenic stone.    Pancreas is partially obscured by overlying bowel gas.    Normal caliber of the IVC.      Impression    IMPRESSION:   Normal right upper quadrant ultrasound.    I have personally reviewed the examination and initial interpretation  and I agree with the findings.    EUNICE JACKSON MD       Pending Results   These results will be followed up by PCP/GI  Unresulted Labs Ordered in the Past 30 Days of this Admission     Date and Time Order Name Status Description    7/29/2019 0344 Enteric Bacteria and Virus Panel by [facility] Stool In process              Primary Care Physician   Physician No Ref-Primary    Discharge Disposition   Discharged to home  Condition at discharge: Stable    Discharge Orders      Reason for your hospital stay    You were seen for abdominal pain, blood in stools.     Adult [facility]  Follow-up and recommended labs and tests    Follow up with a primary care provider.   Please follow up with gastroenterology as previously scheduled.    Appointments on [place] and/or [facility] (with [facility] or [facility] provider or service). Call [phone] if you haven't heard regarding these appointments within 7 days of discharge.     Activity    Your activity upon discharge: activity as tolerated     Full Code     Diet    Follow this diet upon discharge: Orders Placed This Encounter      Regular Diet Adult     Discharge Medications   Discharge Medication List as of 7/29/2019 11:20 AM      CONTINUE these medications which have CHANGED    Details   ondansetron (ZOFRAN ODT) 4 MG ODT tab Take 1 tablet (4 mg) by mouth every 4 hours as needed for nausea, Disp-10 tablet, R-0, E-Prescribe         CONTINUE these medications which have NOT CHANGED    Details   promethazine (PHENERGAN) 25 MG suppository Place 1 suppository (25 mg) rectally every 6 hours as needed for nausea, Disp-10 suppository, R-0, Local Print           Allergies   Allergies   Allergen Reactions     Compazine [Prochlorperazine]      Morphine      Reglan [Metoclopramide]      Toradol [Ketorolac]      Vicodin [Hydrocodone-Acetaminophen]      Only allergy to hydrocodone, able to take tylenol     Zyprexa [Olanzapine]

## 2019-07-29 NOTE — PLAN OF CARE
Observation Goals    PRIOR TO DISCHARGE :     -diagnostic tests and consults completed and resulted: No.   -vital signs normal or at patient baseline: Yes.   -tolerating oral intake to maintain hydration: Patient is NPO at this time.    -adequate pain control on oral analgesics: will monitor effectiveness.     Nurse to notify provider when observation goals have been met and patient is ready for discharge.

## 2019-07-29 NOTE — H&P
[facility] History and Physical        Date of Admission:  7/28/2019  Date of Service: 7/29/2019         HPI      Chief Complaint   Abdominal pain with bloody diarrhea    History is obtained from the patient and EHR    History of Present Illness   Tami is a 23 year old female who has a reported history of Crohn's and is admitted for abdominal pain with bloody diarrhea.    Patient states that she has nausea, vomiting, constant abdominal pain, and bloody diarrhea for the last 4 days.  Said she had 3 episodes of bloody diarrhea today and has been unable to keep down solids or liquids for the last 4 days.  Was seen at [facility] ED on 7/17 for similar issues and had a CT abdomen and pelvis with contrast that did not reveal any cause of acute pain in abdomen or pelvis.  She also had a normal UA, normal lipase, normal glucose at that time.  She was treated with Zofran, hydromorphone, and Phenergan and discharged home.      She moved from [place] 1 month ago and we do not have any records of her Crohn's diagnosis.  She is establishing with [facility], but her appointment is not for another month.  She states that she was diagnosed 4 years ago and is only been on azathioprine in the past for which she states did not work very well.  States has not been on medication for the last 4 years because she has not had any flares since diagnosis.  Today patient also states she has had headaches, fevers, and occasional blurry vision along with her loose bloody stools, nausea, and vomiting.  She denies dizziness, chills, chest pain, shortness of breath, nasal congestion, sore throat, dysuria, urgency, frequency, or vaginal discharge.  LMP was 7/13/19 and her periods have been irregular since she delivered her baby two and half months ago.  Her last unprotected intercourse was over 3 months ago.      In the ED her BP was 141/89, pulse 70, afebrile, RR 16, and satting  well on room air.  Her labs are notable for a potassium of 5.4, but the sample was slightly hemolyzed.  The rest of her CMP was WNL, CRP 5.8, pregnancy test negative, lactate 0.9, lipase 47, guaiac occult blood was negative, WBC WNL, hemoglobin 12.3, UA negative for infection, C. difficile negative, and abdominal ultrasound unremarkable.  She was given 1000 mg of acetaminophen, 25 mg of diphenhydramine, Mylanta/Maalox, fluid bolus of lactated Ringer's, 0.5 mg IV Dilaudid, 4 mg IV Zofran, and sent to the floor in stable condition for further work-up.         History:   Review of Systems   The 10 point Review of Systems is negative other than noted in the HPI or here.   Review of Systems   Constitutional: Positive for fever. Negative for chills.   HENT: Negative for congestion and sore throat.    Eyes: Negative for visual disturbance.   Respiratory: Negative for shortness of breath.    Cardiovascular: Negative for chest pain.   Gastrointestinal: Positive for abdominal pain, blood in stool, diarrhea (with blood and mucous in it), nausea and vomiting.   Genitourinary: Negative for difficulty urinating, dysuria, frequency, urgency and vaginal discharge.   Musculoskeletal: Negative for back pain.   Skin: Negative for rash.   Neurological: Positive for headaches.       Past Medical History    I have reviewed this patient's medical history and updated it with pertinent information if needed.   Past Medical History:   Diagnosis Date     Crohn disease (H)       Past Surgical History   I have reviewed this patient's surgical history and updated it with pertinent information if needed.  Past Surgical History:   Procedure Laterality Date      SECTION       TUBAL LIGATION        Social History   Social History     Tobacco Use     Smoking status: Never Smoker     Smokeless tobacco: Never Used   Substance Use Topics     Alcohol use: Not Currently     Drug use: Not Currently       Family History   I have reviewed this  patient's family history and updated it with pertinent information if needed.   Family History   Problem Relation Age of Onset     Crohn's Disease Mother      Hypertension Father      Colitis Sister      Pancreatitis Sister        Prior to Admission Medications   Prior to Admission Medications   Prescriptions Last Dose Informant Patient Reported? Taking?   promethazine (PHENERGAN) 25 MG suppository   No No   Sig: Place 1 suppository (25 mg) rectally every 6 hours as needed for nausea      Facility-Administered Medications: None     Allergies   Allergies   Allergen Reactions     Compazine [Prochlorperazine]      Morphine      Reglan [Metoclopramide]      Toradol [Ketorolac]      Vicodin [Hydrocodone-Acetaminophen]      Only allergy to hydrocodone, able to take tylenol     Zyprexa [Olanzapine]            Physical Exam      Vital Signs: Temp: 98.6  F (37  C) Temp src: Oral BP: (!) 140/92 Pulse: 58 Heart Rate: 55 Resp: 10 SpO2: 100 % O2 Device: None (Room air)    Weight: 240 lbs 0 oz    Physical Exam   Constitutional: She is oriented to person, place, and time. She appears well-developed and well-nourished. No distress.   HENT:   Head: Normocephalic and atraumatic.   Right Ear: Hearing, tympanic membrane, external ear and ear canal normal.   Left Ear: Hearing, tympanic membrane, external ear and ear canal normal.   Nose: No mucosal edema or rhinorrhea.   Mouth/Throat: Oropharynx is clear and moist and mucous membranes are normal.   Eyes: Pupils are equal, round, and reactive to light. EOM are normal. No scleral icterus.   Neck: Normal range of motion. Neck supple.   Cardiovascular: Normal rate, regular rhythm, normal heart sounds and intact distal pulses. Exam reveals no gallop and no friction rub.   No murmur heard.  Pulmonary/Chest: Effort normal and breath sounds normal. No stridor. No respiratory distress. She has no wheezes. She has no rales.   Abdominal: Soft. Bowel sounds are normal. She exhibits no distension  and no mass. There is tenderness (in a band across her upper abdomen, no lower abdominal tenderness). There is no rebound and no guarding.   Well healed  scar noted on lower abdomen   Genitourinary: Rectum normal. Rectal exam shows no external hemorrhoid, no internal hemorrhoid, no fissure, no tenderness, anal tone normal and guaiac negative stool.   Musculoskeletal: She exhibits no edema.   Lymphadenopathy:     She has no cervical adenopathy.   Neurological: She is alert and oriented to person, place, and time. She has normal strength.   Skin: Skin is warm and dry. Capillary refill takes less than 2 seconds. No rash noted. She is not diaphoretic.   Psychiatric: She has a normal mood and affect. Her behavior is normal.       Assessment & Plan      Tami is a 23 year old female admitted on 2019. She has a reported history of Crohn's and is admitted for abdominal pain with bloody diarrhea.    # Abdominal Pain  # Nausea/Vomting  # Bloody diarrhea  # Reported hx of Crohn's  Unclear cause of abdominal pain at this point.  No witnessed vomiting or bloody diarrhea since she has been in the ED.  No clear diagnosis of Crohn's without records from [place] and no objective evidence.  CT abdomen pelvis on 19 was un[facility]able at [facility] ED and abdominal ultrasound today in the ED here also unremarkable.  CRP within normal limits, no elevation in WBC, C. difficile negative, guaiac stool negative.  Other differentials for abdominal pain: hepatitis, pancreatitis, cholecystitis, gastritis, infectious colitis.  Transaminases within normal limits as well as lipase.  - Attempt to reach GI doc in [place] to get records on Crohn's diagnosis  - Enteric panel  - Calprotectin  - Zofran for nausea and vomiting  - Advance diet as tolerated to clears  - 0.2 mg IV dilaudid Q4H PRN for abdominal pain  - Vitals sign Q4H  - Trend CRP, repeat CMP and CBC in AM    #Elevated BP  Elevated blood pressures in the ED.  Most likely due to  pain.  Patient states she has never been diagnosed with hypertension.  Outside the window for post partum preeclampsia.  - Pain control and continue to monitor blood pressure        # Pain Assessment:  Current Pain Score 7/17/2019   Patient currently in pain? -   Pain score (0-10) 4   - Tami is experiencing pain due to abdominal pain. Pain management was discussed and the plan was created in a collaborative fashion.  Tami's response to the current recommendations: engaged  - Please see the plan for pain management as documented above        Diet: No diet orders on file  Fluids: IV fluid bolus and will trial PO in AM  DVT Prophylaxis: PCDs  Code Status: Full Code    Disposition Plan   Expected discharge: Tomorrow; recommended to prior living arrangement once adequate pain management/ tolerating PO medications and able to tolerate PO. Dispo: Expected Discharge Date: 07/30/19        Entered: Vicky Lott 07/29/2019, 3:24 AM   Information in the above section will display in the discharge planner report.    Discussed with faculty but not examined by faculty.    Vicky Lott  PGY-3  [facility] Medicine Inpatient Service  [facility]   Pager: [phone]  Please see sticky note for cross cover information      Results:     Data   Recent Labs   Lab 07/28/19  2116   WBC 6.3   HGB 12.3   MCV 88         POTASSIUM 5.4*   CHLORIDE 107   CO2 26   BUN 7   CR 0.51*   ANIONGAP 4   BRAD 9.0   *   ALBUMIN 3.8   PROTTOTAL 7.8   BILITOTAL 0.8   ALKPHOS 136   ALT 39   AST 48*   LIPASE 47*     Recent Results (from the past 24 hour(s))   US Abdomen Limited (RUQ)    Narrative    EXAMINATION: US ABDOMEN LIMITED  7/29/2019 1:30 AM      CLINICAL HISTORY: upper abd pain    COMPARISON: CT abdomen 7/17/2019.        FINDINGS:  The liver is normal in contour and echogenicity (measures 16 cm).  There is no intrahepatic or extrahepatic biliary ductal dilatation.  The common bile duct measures 5 mm. The  gallbladder is normal, without  gallstones, wall thickening, or pericholecystic fluid.    Right kidney measures 10 cm. Normal echogenicity without urinary tract  dilatation. No echogenic stone.    Normal IVC.      Impression    IMPRESSION:   Unremarkable abdominal ultrasound. Normal liver and gallbladder.

## 2019-07-29 NOTE — ED NOTES
[facility], [place]   ED Nurse to Floor Handoff     Tami Almaraz is a 23 year old female who speaks English and lives with family members,  in a home  They arrived in the ED by car from home    ED Chief Complaint: Crohns    ED Dx;   Final diagnoses:   Abdominal pain, generalized         Needed?: No    Allergies:   Allergies   Allergen Reactions     Compazine [Prochlorperazine]      Morphine      Reglan [Metoclopramide]      Toradol [Ketorolac]      Vicodin [Hydrocodone-Acetaminophen]      Zyprexa [Olanzapine]    .  Past Medical Hx:   Past Medical History:   Diagnosis Date     Crohn disease (H)       Baseline Mental status: WDL  Current Mental Status changes: at basesline    Infection present or suspected this encounter: no  Sepsis suspected: No  Isolation type: Enteric     Activity level - Baseline/Home:  Independent  Activity Level - Current:   Independent    Bariatric equipment needed?: No    In the ED these meds were given:   Medications   lactated ringers infusion ( Intravenous New Bag 7/28/19 2315)   ondansetron (ZOFRAN) injection 4 mg (4 mg Intravenous Given 7/28/19 2316)   acetaminophen (TYLENOL) tablet 1,000 mg (1,000 mg Oral Not Given 7/29/19 0015)   lidocaine HCl (XYLOCAINE) 2 % 15 mL, alum & mag hydroxide-simethicone (MYLANTA ES/MAALOX  ES) 15 mL GI Cocktail (30 mLs Oral Not Given 7/29/19 0119)   HYDROmorphone (PF) (DILAUDID) injection 0.5 mg (0.5 mg Intravenous Given 7/28/19 2316)   diphenhydrAMINE (BENADRYL) solution 25 mg (25 mg Oral Given 7/29/19 0112)       Drips running?  No    Home pump  No    Current LDAs  Peripheral IV 07/28/19 Left Upper forearm (Active)   Number of days: 1       Labs results:   Labs Ordered and Resulted from Time of ED Arrival Up to the Time of Departure from the ED   CBC WITH PLATELETS DIFFERENTIAL - Abnormal; Notable for the following components:       Result Value    MCHC 31.1 (*)     All other components within normal limits  "  COMPREHENSIVE METABOLIC PANEL - Abnormal; Notable for the following components:    Potassium 5.4 (*)     Glucose 113 (*)     Creatinine 0.51 (*)     AST 48 (*)     All other components within normal limits   LIPASE - Abnormal; Notable for the following components:    Lipase 47 (*)     All other components within normal limits   LACTIC ACID WHOLE BLOOD   ROUTINE UA WITH MICROSCOPIC   CRP INFLAMMATION   HCG QUALITATIVE   ERYTHROCYTE SEDIMENTATION RATE AUTO   CLOSTRIDIUM DIFFICILE TOXIN B       Imaging Studies: No results found for this or any previous visit (from the past 24 hour(s)).    Recent vital signs:   BP (!) 140/98   Pulse 79   Temp 98.6  F (37  C) (Oral)   Resp 16   Ht 1.651 m (5' 5\")   Wt 108.9 kg (240 lb)   LMP 07/12/2019 (Within Days)   SpO2 99%   Breastfeeding? No   BMI 39.94 kg/m      [place] Coma Scale Score: 15 (07/28/19 2212)       Cardiac Rhythm: Other  Pt needs tele? No  Skin/wound Issues: None    Code Status: Full Code    Pain control: fair    Nausea control: fair    Abnormal labs/tests/findings requiring intervention: none    Family present during ED course? No   Family Comments/Social Situation comments: n/a    Tasks needing completion: None    Akanksha Ramos RN  [facility] -- [phone] [facility]  [phone] [facility]      "

## 2019-07-29 NOTE — ED PROVIDER NOTES
[place] EMERGENCY DEPARTMENT ([facility])  19  ED 23 11:04 PM   History     Chief Complaint   Patient presents with     Crohns     HPI  Tami Almaraz is a 23 year old female, recently moved to [place] from [place], who reports having a history of Crohn's, reported history of C. difficile and states that she had a  on 2019, presents in concern for ongoing abdominal discomfort and now bloody diarrhea, reportedly concerned for C. Diff flare.     Patient reports that over the last couple weeks she has had ongoing cramping abdominal discomfort.  Feels somewhat worse in the upper abdomen.  Is associated with nausea and emesis as well as looser stools.  Says over the last couple days developed bloody diarrhea.  Says this reminds her of previous Crohn's flares.  She says she has not seen GI since moving to [place] but has an appointment scheduled with [facility] for later this month.  Denies any troubles with the  delivery.  Has been doing well in that regard since that time.  (She also  later reports that she is had a previous C. difficile infection and says that she is here because she is concerned that she has recurrent C. Difficile.)    Pain is located in the abdomen, throughout, somewhat worse in the upper abdomen.  Worse with movement and worse with eating/drinking.  Somewhat improved if she does not move.  No change to urine symptoms, denies any vaginal bleeding or discharge.  No rashes or skin changes.  No chest pain or breathing symptoms.  No fevers or chills.  No lightheadedness, dizziness, syncope or near syncope.  No chest pain, palpitations, cough or shortness of breath.. No other new symptoms or complaints at this time.  Please see ROS for further details.    Patient was seen in the [facility] ED on 2019, reporting similar symptoms.  She reported history of Crohn's disease is not currently medicated complained of abdominal pain, nausea,  vomiting and diarrhea.  She also reported having a recent .    She had a CT abdomen pelvis with contrast that did not identify any cause of acute pain in the abdomen or pelvis.  She had normal UA, normal lipase, normal glucose.  She was treated with Zofran, hydromorphone and Phenergan.  They were concerned about drug-seeking behavior given discrepancies in her history and exam.     [facility] EVERYWHERE UPDATES:  [facility] and [facility] records reviewed.  She has a Quick Care visit on 2019 for mid lower abdominal pain.  She reported and at that time having an LMP of 2019.   Per the quick care visit notes on 2019, exam and hpi do not mention gravid abdomen, any recent surgical procedure, or any new findings for incision on exam.  I discussed the discrepancy in the dates that she is giving us along with the information from the quick care visit in [facility] everywhere.  She denies ever having been to a quick care clinic.  She adamantly then reports again that her baby was born via  section on 2019 and is only couple months old.  She says that she was able to be discharged from the hospital after 4 days that her child had to be admitted to the NICU and was just discharged in .  She believes that we must not be looking at the appropriate records via [facility] everywhere.    I have reviewed the Medications, Allergies, Past Medical and Surgical History, and Social History in the Epic system.     All history is provided by patient, no corroborating medical records available at this time.  Medical records that are available seem to be somewhat contradictory to patient's reported history.  Past Medical History:   Diagnosis Date     Crohn disease (H)        Past Surgical History:   Procedure Laterality Date      SECTION       TUBAL LIGATION         Family History   Problem Relation Age of Onset     Crohn's Disease Mother      Hypertension Father      Colitis Sister   "    Pancreatitis Sister        Social History     Tobacco Use     Smoking status: Never Smoker     Smokeless tobacco: Never Used   Substance Use Topics     Alcohol use: Not Currently       Review of Systems    Physical Exam   BP: 141/89  Pulse: 70  Heart Rate: 71  Temp: 98.6  F (37  C)  Resp: 16  Height: 165.1 cm (5' 5\")  Weight: 108.9 kg (240 lb)  SpO2: 97 %      Physical Exam  CONSTITUTIONAL: Well-developed and well-nourished. Awake and alert. Non-toxic appearance. No acute distress.   HENT:   - Head: Normocephalic and atraumatic.   - Ears: Hearing and external ear grossly normal.   - Nose: Nose normal. No rhinorrhea. No epistaxis.   - Mouth/Throat: MMM  EYES: Conjunctivae and lids are normal. No scleral icterus.   NECK: Normal range of motion and phonation normal. Neck supple.  No tracheal deviation, no stridor. No edema or erythema noted.  CARDIOVASCULAR: Normal rate, regular rhythm and no appreciable abnormal heart sounds.  PULMONARY/CHEST: Normal work of breathing. No accessory muscle usage or stridor. No respiratory distress.  No appreciable abnormal breath sounds.   ABDOMEN: Soft, non-distended.  Does report some upper abdominal tenderness to palpation. Bowel sounds grossly WNL.   Patient seems to exhibit more discomfort with palpation by hand as opposed to 1 pressure applied with the stethoscope during bowel auscultation.  No peritoneal findings, no rigidity, rebound or guarding.  No palpable masses or pulsatility.  Does have a low  scar, unknown age of scar.  MUSCULOSKELETAL: Extremities warm and seemingly well perfused. No edema or calf tenderness.  NEUROLOGIC: Awake, alert. Not disoriented. She displays no atrophy and no tremor. Normal tone. No seizure activity. GCS 15  SKIN: Skin is warm and dry. No rash noted. No diaphoresis. No pallor.   PSYCHIATRIC: Normal mood and affect. Speech and behavior normal. Thought processes linear. Cognition and memory are normal.        Assessments & Plan (with " Medical Decision Making)   IMPRESSION: 23 year old female with patient reporting a PMH notable for Crohn's, previous C. Difficile infections, and reported  delivery on 19 (2nd child per pt report), presenting with abdominal discomfort, diarrhea with reports of new bloody diarrhea, as described further above in HPI/ROS.  During ED stay patient originally reports that she is concerned that she could be having a Crohn's flare, later says what she is concerned about this and ensuring that she does not have a recurrent bout of C. Difficile.     Clinically, patient appears Nontoxic, NAD. Vitals grossly WNL has some epigastric discomfort to palpation). Otherwise on examination, seems to exhibit more discomfort to palpation with hand versus pressure with stethoscope listening to bowel sounds.  Abdominal exam soft and benign.  No peritoneal findings.  Remainder of exam is grossly WNL without obvious emergent findings    Ddx includes, but not limited to, gastritis/PUD, ulcerative disease, Crohn's flare, recurrent C. difficile, functional abdominal pain, malingering, et al.     EMR reviewed including [facility] records. Phone call discussion with [facility], unable to find any documentation of a formal Crohn's diagnosis or recent hosn and . We confirmed spelling of name, bdate, facility and address with patient.     PLAN: Laboratory studies, urine studies, symptom management.  Given recent imaging and benign exam, if no obvious lab abnormalities would hold off on repeat emergent imaging at this time    RESULTS:  - Labs: No acute findings on labs to explain symptoms and no inflammatory marker elevation.  Pregnancy test negative  --- Stool C. difficile ordered but no sample yet provided (enteric panel was negative at recent ED visit)   - Urine: No UTI, no blood  - US: Pending at shift change    INTERVENTIONS:   - Initial medications: IV fluid, IV Zofran IV Dilaudid (0.5 mg IV x1) ordered  "after initial history for symptom management during work-up (Before some concerns about inconsistencies in history/EMR were identified.)  - Patient requested additional pain and antiemetic medications.  Ordered Tylenol and additional Zofran but patient refused those medications.   --- She then requested \"the pain medication that starts with a 'D'\" (when asked if she meant Dilaudid, she said yes), and \"the nausea medication that starts with an 'A'\" (confirms ativan is what she meant, not atarax or other medication). Declined to provide either of these meds given lack of objective findings on testing, exam, etc., in setting of medical record inconsistencies that we reviewed w/ patient. She expressed understanding.   - Patient amenable to some oral diphenhydramine for additional nausea assistance.    RE-EVALUATION:  - The patient's symptoms were not significantly improved during ED stay per pt report.  - Pt however otherwise continues to do well here in the ED, looks comfortable, no acute issues or apparent concerning changes in vitals or clinical appearance.    DISCUSSIONS:  - w/ Nurse at general line at [facility] (not patient's usual clinic as attempts call there or not answered as was not business hours): Nurse was able to find patient's record in their system but did not see Crohn's disease listed as an issue. Nurse noted [facility] visits as well as a family medicine appointment in 2018 but otherwise no GI visits that she could see  - w/ Patient: I have reviewed the available findings, plan.  Unclear if we actually have access to all the medical records.  If we do have all of the access to medical records and I have concerns about patient potentially trying to mislead staff about her history.    - On the other hand if she is establishing care here to possible especially given the weekend that we may not have access to all the records to support her reported history, as I am unable to call her usual " "providers at this time of night/weekend.  - In an attempt to provide the patient benefit of the doubt, given that she is new to our facility I think it is reasonable to observe with serial exams, but inconsistent enough I would hold off on additional narcotic pain medications unless an objective finding is identified.  - Discussed our concerns about the inconsistencies in her history of medical records with the patient. She adamently says the records are not accurate, that she has \"never been to [facility]\" type of clinics, and   Patient requesting CT scan which I do not think is indicated at this time given recent CT scan, benign abdominal exam and laboratory studies.  She is requesting more pain medications, says she cannot take Tylenol at this point nor any NSAIDs, discussed her concern for narcotics at this point and informed well we will bring in for serial abdominal exams, etc. that additional narcotic pain medications will be at the discretion of the admitting providers.    DISPOSITION/PLANNING:  IMPRESSION:   --- Abdominal pain with patient reporting a history of Crohn's and  delivery in May, previous c.diff infection   - versus -   ---Possible intentional misrepresentation of symptoms/history with functional abdominal pain or malingering for pain medication (inconsistencies in patient's reported history and available medical records)  DISPOSITION: Admit to IM Observation for further evaluation and mgmt   PENDING: C. difficile testing  OTHER RECOMMENDATIONS:   - Recommend not using narcotic pain medications unless any objective findings, unless records can be verified given concerning behaviors and inconsistencies with available records  - Recommend contacting her reported usual clinic during business hours in the morning to validate her reported potential diagnosis/previous GI notes, reported recent obstetrical history notes.  - If it is confirmed that these conditions were not present or not as " described above, concern for attempts at misleading providers given patient was so adamant in her description of symptoms history/chronology.   - Try to set up with GI (consult during obs and outpatient referral) in case report of Crohns be accurate, in order to establish care in this area.       ______________________________________________________________________      7/28/2019   [facility], [place], EMERGENCY DEPARTMENT     Tracy Almaraz MD  07/31/19 [id number]

## 2019-07-30 ENCOUNTER — PATIENT OUTREACH (OUTPATIENT)
Dept: CARE COORDINATION | Facility: CLINIC | Age: 24
End: 2019-07-30

## 2019-07-31 NOTE — PROGRESS NOTES
Attempted to make post discharge call  No answer and the voice mail box is full unable to leave message

## 2019-12-01 ENCOUNTER — OFFICE VISIT (OUTPATIENT)
Dept: URGENT CARE | Facility: URGENT CARE | Age: 24
End: 2019-12-01

## 2019-12-01 VITALS
HEART RATE: 81 BPM | OXYGEN SATURATION: 97 % | TEMPERATURE: 98 F | DIASTOLIC BLOOD PRESSURE: 61 MMHG | WEIGHT: 242.4 LBS | SYSTOLIC BLOOD PRESSURE: 111 MMHG

## 2019-12-01 DIAGNOSIS — Z11.3 SCREEN FOR STD (SEXUALLY TRANSMITTED DISEASE): Primary | ICD-10-CM

## 2019-12-01 PROCEDURE — 87661 TRICHOMONAS VAGINALIS AMPLIF: CPT | Performed by: NURSE PRACTITIONER

## 2019-12-01 PROCEDURE — 87591 N.GONORRHOEAE DNA AMP PROB: CPT | Performed by: NURSE PRACTITIONER

## 2019-12-01 PROCEDURE — 86780 TREPONEMA PALLIDUM: CPT | Performed by: NURSE PRACTITIONER

## 2019-12-01 PROCEDURE — 99203 OFFICE O/P NEW LOW 30 MIN: CPT | Performed by: NURSE PRACTITIONER

## 2019-12-01 PROCEDURE — 87389 HIV-1 AG W/HIV-1&-2 AB AG IA: CPT | Performed by: NURSE PRACTITIONER

## 2019-12-01 PROCEDURE — 87491 CHLMYD TRACH DNA AMP PROBE: CPT | Performed by: NURSE PRACTITIONER

## 2019-12-01 PROCEDURE — 36415 COLL VENOUS BLD VENIPUNCTURE: CPT | Performed by: NURSE PRACTITIONER

## 2019-12-01 ASSESSMENT — ENCOUNTER SYMPTOMS
APPETITE CHANGE: 0
DYSURIA: 0
FEVER: 0
ABDOMINAL DISTENTION: 1
VOMITING: 0
BACK PAIN: 1
FATIGUE: 1
FLANK PAIN: 0
CHILLS: 0
DIARRHEA: 1
NAUSEA: 1
ABDOMINAL PAIN: 1
FREQUENCY: 0
ACTIVITY CHANGE: 1

## 2019-12-01 NOTE — PROGRESS NOTES
SUBJECTIVE:   Tami Almaraz is a 23 year old female presenting with a chief complaint of   Chief Complaint   Patient presents with     Abdominal Pain     Patient complains of stomach cramps x 3 weeks. Patient states that she had menstrual 2 weeks ago.       She is a new patient of [place].    GYN Complaint    Onset of symptoms was 2-3 weeks ago.  Course of illness is waxing and waning  Current and Associated symptoms: STD exposure and new onset intermittent low abdominal cramping. Denies any dysuria or vaginal discharge.   Treatment measures tried include:  None  Sexually active: yes, multiple partners, contraception - intermittent condom use  Predisposing factors: multiple sexual partners, last unprotected 4 days ago  Hx of previous symptom: reports history of trich and requests screening  LMP: 11/11/2019, no contraceptive use      Review of Systems   Constitutional: Positive for activity change and fatigue. Negative for appetite change, chills and fever.   Gastrointestinal: Positive for abdominal distention, abdominal pain, diarrhea and nausea. Negative for vomiting.   Genitourinary: Negative for dyspareunia, dysuria, flank pain, frequency, menstrual problem, pelvic pain, urgency, vaginal bleeding and vaginal discharge.   Musculoskeletal: Positive for back pain.   All other systems reviewed and are negative.      No past medical history on file.  No family history on file.  No current outpatient medications on file.     Social History     Tobacco Use     Smoking status: Never Smoker     Smokeless tobacco: Never Used   Substance Use Topics     Alcohol use: Not on file       OBJECTIVE  /61 (BP Location: Left arm, Patient Position: Chair, Cuff Size: Adult Regular)   Pulse 81   Temp 98  F (36.7  C) (Oral)   Wt 110 kg (242 lb 6.4 oz)   SpO2 97%     Physical Exam  Constitutional:       General: She is not in acute distress.     Appearance: Normal appearance. She is not ill-appearing, toxic-appearing or  diaphoretic.   Cardiovascular:      Rate and Rhythm: Normal rate and regular rhythm.      Pulses: Normal pulses.      Heart sounds: Normal heart sounds. No murmur. No friction rub. No gallop.    Pulmonary:      Effort: Pulmonary effort is normal.      Breath sounds: Normal breath sounds. No wheezing or rhonchi.   Abdominal:      General: Bowel sounds are normal. There is no distension.      Palpations: Abdomen is soft. There is no mass.      Tenderness: There is no abdominal tenderness. There is no right CVA tenderness, left CVA tenderness, guarding or rebound.      Hernia: No hernia is present.   Skin:     General: Skin is warm.      Capillary Refill: Capillary refill takes less than 2 seconds.      Findings: No rash.   Neurological:      General: No focal deficit present.      Mental Status: She is alert and oriented to person, place, and time. Mental status is at baseline.   Psychiatric:         Mood and Affect: Mood normal.         Behavior: Behavior normal.         Labs:  No results found for this or any previous visit (from the past 24 hour(s)).      ASSESSMENT:    ICD-10-CM    1. Screen for STD (sexually transmitted disease) Z11.3 HIV Antigen Antibody Combo [ADB0898]     Treponema Abs w Reflex to RPR and Titer [[id number]]     Chlamydia trachomatis PCR [HES862]     Neisseria gonorrhoeae PCR [VHE2836]     Trichomonas vaginalis WALTER Urine ([facility])        Medical Decision Making:    Differential Diagnosis:  STD screening     Serious Comorbid Conditions:  Adult:  None    PLAN: Discussed with patient overall STD screening process. Safe sex education provided.     Advised to return to follow up with PCP as needed. Patient agreed to the plan of care.     Alcira Francisco APRN, CNP    Patient Instructions       Patient Education     What Are Sexually Transmitted Diseases (STDs)?  A sexually transmitted disease (STD) is a disease that is spread during sex. (An STD can also be called STI for sexually transmitted  infection.) You can become infected with an STD if you have sex with someone who has an STD. Any sex that involves the penis, vagina, anus, or mouth can spread disease. Some STDs spread through body fluids such as semen, vaginal fluid, or blood. Others spread through contact with affected skin.  Who is at risk?     Places on or in the body where STDs cause damage include reproductive organs, the rectum, and the mouth.   It doesn t matter if you re straight or hale, male or female, young or old. Any person who has sex can get an STD. Your risk increases if:    You have more than one partner. The more partners you have, the greater your risk.    Your partner has other partners. If your partner is exposed to an STD, you could be, too.    You or your partner have had sex with other people in the past. Either of you might be carrying an STD from an earlier partner.    You have an STD. The STD may cause sores or other health problems that increase your risk of new infections. Your risk will stay high unless you change the behaviors that put you at risk of the current infection.  Prevent future problems  Left untreated, certain STDs can lead to cancer or even death. Some can harm unborn babies whose mothers are infected. Others can cause you to not be able to have children (sterility) or can affect changes in behavior or your ability to think. You can prevent these problems with safer sex, regular checkups, and early treatment. Always use a latex condom when you have sex. Get tested if you re at risk. And get treated early if you have an STD.  Getting checked  The only sure way to know if you have an STD is to get checked by a healthcare provider. If you notice a change in how your body looks or feels, have it checked out. But keep in mind, STDs don t always show symptoms. So if you re at risk of STDs, get checked regularly. If you find you have an STD, be sure your partner gets treatment, too. If not, his or her health is  at risk. And left untreated, your partner could pass the STD back to you, or on to others.  Common symptoms  Be alert to any changes in your body and your partner s body. Symptoms may appear in or near the vagina, penis, rectum, mouth, or throat. They include:    Unusual discharge    Lumps, bumps, or rashes    Sores that may be painful, itchy, or painless    Itchy skin    Burning with urination    Pain in the pelvis, belly (abdomen), or rectum  Even if you don t have symptoms  You may have an STD, even if you don t have symptoms. If you think you are at risk, get checked. Go to a clinic or to your healthcare provider. If your partner has an STD, you need to be tested too, even if you feel fine.  Vaccines to prevent disease  Vaccines (also called immunizations) are available to prevent hepatitis A and hepatitis B. These are two kinds of STDs. There is also a vaccine to prevent HPV. This is a virus that can be passed from person to person through sexual contact. Ask your healthcare provider whether any of these vaccines is right for you.   Date Last Reviewed: 11/1/2016 2000-2018 [employer]. [address] [place] [address], [place] [address]. All rights reserved. This information is not intended as a substitute for professional medical care. Always follow your healthcare professional's instructions.

## 2019-12-01 NOTE — PATIENT INSTRUCTIONS
Patient Education     What Are Sexually Transmitted Diseases (STDs)?  A sexually transmitted disease (STD) is a disease that is spread during sex. (An STD can also be called STI for sexually transmitted infection.) You can become infected with an STD if you have sex with someone who has an STD. Any sex that involves the penis, vagina, anus, or mouth can spread disease. Some STDs spread through body fluids such as semen, vaginal fluid, or blood. Others spread through contact with affected skin.  Who is at risk?     Places on or in the body where STDs cause damage include reproductive organs, the rectum, and the mouth.   It doesn t matter if you re straight or hale, male or female, young or old. Any person who has sex can get an STD. Your risk increases if:    You have more than one partner. The more partners you have, the greater your risk.    Your partner has other partners. If your partner is exposed to an STD, you could be, too.    You or your partner have had sex with other people in the past. Either of you might be carrying an STD from an earlier partner.    You have an STD. The STD may cause sores or other health problems that increase your risk of new infections. Your risk will stay high unless you change the behaviors that put you at risk of the current infection.  Prevent future problems  Left untreated, certain STDs can lead to cancer or even death. Some can harm unborn babies whose mothers are infected. Others can cause you to not be able to have children (sterility) or can affect changes in behavior or your ability to think. You can prevent these problems with safer sex, regular checkups, and early treatment. Always use a latex condom when you have sex. Get tested if you re at risk. And get treated early if you have an STD.  Getting checked  The only sure way to know if you have an STD is to get checked by a healthcare provider. If you notice a change in how your body looks or feels, have it checked out.  But keep in mind, STDs don t always show symptoms. So if you re at risk of STDs, get checked regularly. If you find you have an STD, be sure your partner gets treatment, too. If not, his or her health is at risk. And left untreated, your partner could pass the STD back to you, or on to others.  Common symptoms  Be alert to any changes in your body and your partner s body. Symptoms may appear in or near the vagina, penis, rectum, mouth, or throat. They include:    Unusual discharge    Lumps, bumps, or rashes    Sores that may be painful, itchy, or painless    Itchy skin    Burning with urination    Pain in the pelvis, belly (abdomen), or rectum  Even if you don t have symptoms  You may have an STD, even if you don t have symptoms. If you think you are at risk, get checked. Go to a clinic or to your healthcare provider. If your partner has an STD, you need to be tested too, even if you feel fine.  Vaccines to prevent disease  Vaccines (also called immunizations) are available to prevent hepatitis A and hepatitis B. These are two kinds of STDs. There is also a vaccine to prevent HPV. This is a virus that can be passed from person to person through sexual contact. Ask your healthcare provider whether any of these vaccines is right for you.   Date Last Reviewed: 11/1/2016 2000-2018 [employer] [address] [place] [address], [place] [address]. All rights reserved. This information is not intended as a substitute for professional medical care. Always follow your healthcare professional's instructions.

## 2019-12-02 LAB
HIV 1+2 AB+HIV1 P24 AG SERPL QL IA: NONREACTIVE
SPECIMEN SOURCE: NORMAL
T VAGINALIS DNA SPEC QL NAA+PROBE: NORMAL

## 2019-12-03 LAB
C TRACH DNA SPEC QL NAA+PROBE: NEGATIVE
N GONORRHOEA DNA SPEC QL NAA+PROBE: NEGATIVE
SPECIMEN SOURCE: NORMAL
SPECIMEN SOURCE: NORMAL
T PALLIDUM AB SER QL: NONREACTIVE

## 2020-05-12 ENCOUNTER — ALLIED HEALTH/NURSE VISIT (OUTPATIENT)
Dept: NURSING | Facility: CLINIC | Age: 25
End: 2020-05-12
Payer: COMMERCIAL

## 2020-05-12 DIAGNOSIS — Z11.1 VISIT FOR MANTOUX TEST: Primary | ICD-10-CM

## 2020-05-12 PROCEDURE — 99207 ZZC NO CHARGE NURSE ONLY: CPT

## 2020-05-12 PROCEDURE — 86580 TB INTRADERMAL TEST: CPT

## 2020-05-14 ENCOUNTER — ALLIED HEALTH/NURSE VISIT (OUTPATIENT)
Dept: NURSING | Facility: CLINIC | Age: 25
End: 2020-05-14
Payer: COMMERCIAL

## 2020-05-14 DIAGNOSIS — Z11.1 SCREENING EXAMINATION FOR PULMONARY TUBERCULOSIS: Primary | ICD-10-CM

## 2020-05-14 LAB
PPDINDURATION: 0 MM (ref 0–5)
PPDREDNESS: 0 MM

## 2020-05-14 PROCEDURE — 99207 ZZC NO CHARGE NURSE ONLY: CPT

## 2020-05-14 NOTE — PATIENT INSTRUCTIONS
[place] result:  Lab Results   Component Value Date    PPDREDNESS 0 05/14/2020    PPDINDURATIO 0 05/14/2020     Is induration greater than 5mm?  Crystal Milligan RN

## 2020-06-19 ENCOUNTER — TELEPHONE (OUTPATIENT)
Dept: FAMILY MEDICINE | Facility: CLINIC | Age: 25
End: 2020-06-19

## 2020-06-19 NOTE — TELEPHONE ENCOUNTER
Spoke with pt. Rash is on her right foot. In between toes and going down the side. Rash is itchy. Is now having sores. No blisters. Skin is shedding off. Tried gold bond over the counter and that didn't help. Has also tried sprays and creams and symptoms are worsening. No fever. No other symptoms. Advised in person, video or evisit is recommended. Assisted pt in re-activating her mychart. Video visit scheduled.    Rashida Brown RN  [facility]

## 2020-06-19 NOTE — TELEPHONE ENCOUNTER
Reason for call:  Symptom   Symptom or request: Rash on foot     Duration (how long have symptoms been present): 1 day   Have you been treated for this before? No    Additional comments: na    Phone number to reach patient:  Home number on file [phone] (home)    Best Time:  any    Can we leave a detailed message on this number?  YES    Travel screening: Negative

## 2020-06-22 ENCOUNTER — VIRTUAL VISIT (OUTPATIENT)
Dept: FAMILY MEDICINE | Facility: CLINIC | Age: 25
End: 2020-06-22
Payer: COMMERCIAL

## 2020-06-22 DIAGNOSIS — R21 RASH: Primary | ICD-10-CM

## 2020-06-22 NOTE — PROGRESS NOTES
"Tami Almaraz is a 24 year old female who is being evaluated via a billable video visit.      The patient has been notified of following:     \"This video visit will be conducted via a call between you and your physician/provider. We have found that certain health care needs can be provided without the need for an in-person physical exam.  This service lets us provide the care you need with a video conversation.  If a prescription is necessary we can send it directly to your pharmacy.  If lab work is needed we can place an order for that and you can then stop by our lab to have the test done at a later time.    Video visits are billed at different rates depending on your insurance coverage.  Please reach out to your insurance provider with any questions.    If during the course of the call the physician/provider feels a video visit is not appropriate, you will not be charged for this service.\"    Patient has given verbal consent for Video visit? Yes, send test to     Will anyone else be joining your video visit? No      Subjective     Tami Almaraz is a 24 year old female who presents today via video visit for the following health issues:    HPI     Rash      Duration: 1 month    Description  Location: right foot  Itching: YES    Intensity:  Itching is severe    Accompanying signs and symptoms: None    History (similar episodes/previous evaluation): None    Precipitating or alleviating factors:  New exposures:  None  Recent travel: no      Therapies tried and outcome: gold bond powder no help    "

## 2020-06-23 ENCOUNTER — VIRTUAL VISIT (OUTPATIENT)
Dept: FAMILY MEDICINE | Facility: CLINIC | Age: 25
End: 2020-06-23
Payer: COMMERCIAL

## 2020-06-23 DIAGNOSIS — B35.3 TINEA PEDIS OF BOTH FEET: Primary | ICD-10-CM

## 2020-06-23 PROCEDURE — 99213 OFFICE O/P EST LOW 20 MIN: CPT | Mod: 95 | Performed by: FAMILY MEDICINE

## 2020-06-23 RX ORDER — CLOTRIMAZOLE 1 %
CREAM (GRAM) TOPICAL 2 TIMES DAILY
Qty: 45 G | Refills: 0 | Status: SHIPPED | OUTPATIENT
Start: 2020-06-23 | End: 2020-08-25

## 2020-06-23 NOTE — PROGRESS NOTES
"Tami Almaraz is a 24 year old female who is being evaluated via a billable telephone visit.      The patient has been notified of following:     \"This telephone visit will be conducted via a call between you and your physician/provider. We have found that certain health care needs can be provided without the need for a physical exam.  This service lets us provide the care you need with a short phone conversation.  If a prescription is necessary we can send it directly to your pharmacy.  If lab work is needed we can place an order for that and you can then stop by our lab to have the test done at a later time.    Telephone visits are billed at different rates depending on your insurance coverage. During this emergency period, for some insurers they may be billed the same as an in-person visit.  Please reach out to your insurance provider with any questions.    If during the course of the call the physician/provider feels a telephone visit is not appropriate, you will not be charged for this service.\"    Patient has given verbal consent for Telephone visit?  {YES-NO  Default Yes:4444::\"Yes\"}    What phone number would you like to be contacted at? ***    How would you like to obtain your AVS? {AVS Preference:515540}    Subjective     Tami Almaraz is a 24 year old female who presents via phone visit today for the following health issues:    HPI  {SUPERLIST (Optional):216310}  {PEDS Chronic and Acute Problems (Optional):537254}     {additonal problems for provider to add (Optional):715716}    {HIST REVIEW/ LINKS 2 (Optional):717106}    Reviewed and updated as needed this visit by Provider         Review of Systems   {ROS COMP (Optional):044116}       Objective   Reported vitals:  There were no vitals taken for this visit.   {GENERAL APPEARANCE:50::\"healthy\",\"alert\",\"no distress\"}  PSYCH: Alert and oriented times 3; coherent speech, normal   rate and volume, able to articulate logical thoughts, able   to abstract reason, " "no tangential thoughts, no hallucinations   or delusions  Her affect is { :3897398::\"normal\"}  RESP: No cough, no audible wheezing, able to talk in full sentences  Remainder of exam unable to be completed due to telephone visits    {Diagnostic Test Results (Optional):336598::\"Diagnostic Test Results:\",\"Labs reviewed in Epic\"}        Assessment/Plan:  {Diagnosis, Associated Orders and Comment:653316}    No follow-ups on file.      Phone call duration:  *** minutes    {signature options:268212}        "

## 2020-06-23 NOTE — PATIENT INSTRUCTIONS
Patient Education     Athlete s Foot    Athlete s foot (tinea pedis) is caused by a fungal infection in the skin. It affects the skin between the toes, causing cracks in the skin called fissures. It can also affect the bottom of the foot where it causes dry white scales and peeling of the skin. This infection is more likely to occur when the foot is in hot, sweaty socks and shoes for long periods of time. You may feel itching and burning between your toes. This infection is treated with skin creams or medicine taken by mouth.  Home care  The following are general care guidelines:    It is important to keep the feet dry. Use absorbent cotton socks and change them if they become sweaty. Or wear an open-toe shoe or sandal. Wash the feet at least once a day with soap and water.    Apply the antifungal cream as prescribed. Some antifungal creams are available without a prescription.    It may take a week before the rash starts to improve. It can take about 3 to 4 weeks to completely clear. Continue the medicine until the rash is all gone.    Use over-the-counter antifungal powders or sprays on your feet after exposure to high-risk environments, such as public showers, gyms, and locker rooms. This can help prevent future infections. Wearing appropriate shoes in these situations can help.  Prevention  The following tips may help prevent athlete s foot:    Don't share shoes or socks with someone who has athlete's foot.    Don't walk barefoot in places where a fungal infection can spread quickly such as locker rooms, showers, and swimming pools.    Change socks regularly.    Alternate shoes to assist in drying.  Follow-up care  Follow up with your healthcare provider as recommended if the rash does not improve after 10 days of treatment, or if the rash continues to spread.  When to seek medical care  Get medical attention right away if any of the following occur:    Fever of 100.4 F (38 C) or higher, or as  directed    Increasing redness or swelling of the foot    Infection comes back soon after treatment    Pus draining from cracks in the skin  Date Last Reviewed: 8/1/2016 2000-2019 [employer]. [address] [place] [address], [place] [address]. All rights reserved. This information is not intended as a substitute for professional medical care. Always follow your healthcare professional's instructions.

## 2020-06-23 NOTE — PROGRESS NOTES
"Tami Almaraz is a 24 year old female who is being evaluated via a billable telephone visit.      The patient has been notified of following:     \"This telephone visit will be conducted via a  Video call between you and your physician/provider. We have found that certain health care needs can be provided without the need for a physical exam.  This service lets us provide the care you need with a short phone conversation.  If a prescription is necessary we can send it directly to your pharmacy.  If lab work is needed we can place an order for that and you can then stop by our lab to have the test done at a later time.    Video  visits are billed at different rates depending on your insurance coverage. During this emergency period, for some insurers they may be billed the same as an in-person visit.  Please reach out to your insurance provider with any questions.    If during the course of the call the physician/provider feels a telephone visit is not appropriate, you will not be charged for this service.\"    Patient has given verbal consent for video  visit?  Yes    What phone number would you like to be contacted at? [phone]  11:50 AM -start visit  How would you like to obtain your AVS? Ashli    Subjective     Tami Almaraz is a 24 year old female who presents via phone visit today for the following health issues:    HPI  Rash-Looks Like athlete's Foot  Onset: x 1 month    Description:   Location: right foot  Character: blotchy, burning  Itching (Pruritis): YES    Progression of Symptoms:  worsening    Accompanying Signs & Symptoms:  Fever: no   Body aches or joint pain: no   Sore throat symptoms: no   Recent cold symptoms: no     History:   Previous similar rash: no     Precipitating factors:   Exposure to similar rash: no   New exposures: None   Recent travel: no     Alleviating factors:  none    Therapies Tried and outcome: none          Patient Active Problem List   Diagnosis     Abdominal pain     Past " Surgical History:   Procedure Laterality Date      SECTION       TUBAL LIGATION         Social History     Tobacco Use     Smoking status: Never Smoker     Smokeless tobacco: Never Used   Substance Use Topics     Alcohol use: Not Currently     Family History   Problem Relation Age of Onset     Crohn's Disease Mother      Hypertension Father      Colitis Sister      Pancreatitis Sister          Current Outpatient Medications   Medication Sig Dispense Refill     clotrimazole (LOTRIMIN) 1 % external cream Apply topically 2 times daily 45 g 0     ondansetron (ZOFRAN ODT) 4 MG ODT tab Take 1 tablet (4 mg) by mouth every 4 hours as needed for nausea (Patient not taking: Reported on 2020) 10 tablet 0     promethazine (PHENERGAN) 25 MG suppository Place 1 suppository (25 mg) rectally every 6 hours as needed for nausea (Patient not taking: Reported on 2020) 10 suppository 0     Allergies   Allergen Reactions     Compazine [Prochlorperazine]      Morphine      Reglan [Metoclopramide]      Toradol [Ketorolac]      Vicodin [Hydrocodone-Acetaminophen]      Only allergy to hydrocodone, able to take tylenol     Zyprexa [Olanzapine]      Recent Labs   Lab Test 19  0550 19  2116 19  2036   ALT 31 39 25   CR 0.64 0.51* 0.71   GFRESTIMATED >90 >90 >90   GFRESTBLACK >90 >90 >90   POTASSIUM 3.4 5.4* 4.0      BP Readings from Last 3 Encounters:   19 111/61   19 117/72   19 137/76    Wt Readings from Last 3 Encounters:   19 110 kg (242 lb 6.4 oz)   19 108.9 kg (240 lb)   19 99.8 kg (220 lb)                    Reviewed and updated as needed this visit by Provider  Tobacco  Allergies  Meds  Problems  Med Hx  Surg Hx  Fam Hx         Review of Systems   Rest of the ROS is Negative except see above and Problem list [stable]         Objective   Reported vitals:  There were no vitals taken for this visit.   healthy, alert and no distress  PSYCH: Alert and oriented  times 3; coherent speech, normal   rate and volume, able to articulate logical thoughts, able   to abstract reason, no tangential thoughts, no hallucinations   or delusions  Her affect is normal  RESP: No cough, no audible wheezing, able to talk in full sentences  Remainder of exam unable to be completed due to telephone visits  Typival whitish rash of tinea pedis see between her Toes  Diagnostic Test Results:  none         Assessment/Plan:  1. Tinea pedis of both feet  [facility] care orders  The potential side effects of this medication have been discussed with the patient.  Call if any significant problems with these are experienced.  Keep feet Dry  Follow up 1 month if not better  - clotrimazole (LOTRIMIN) 1 % external cream; Apply topically 2 times daily  Dispense: 45 g; Refill: 0  Advised schedule pap smear  Return in about 1 month (around 7/23/2020) for Physical Exam, recheck.   Video visit done From 11.55 AM to 12.01 PM  .0n Doximity with patients consent  Pt at Home  Provider at Home  12:05 PM -visiit completed    Mary Mckenna MD

## 2020-06-29 ENCOUNTER — ALLIED HEALTH/NURSE VISIT (OUTPATIENT)
Dept: NURSING | Facility: CLINIC | Age: 25
End: 2020-06-29
Payer: COMMERCIAL

## 2020-06-29 DIAGNOSIS — Z11.1 SCREENING EXAMINATION FOR PULMONARY TUBERCULOSIS: Primary | ICD-10-CM

## 2020-06-29 PROCEDURE — 86580 TB INTRADERMAL TEST: CPT

## 2020-07-02 ENCOUNTER — ALLIED HEALTH/NURSE VISIT (OUTPATIENT)
Dept: NURSING | Facility: CLINIC | Age: 25
End: 2020-07-02
Payer: COMMERCIAL

## 2020-07-02 DIAGNOSIS — Z11.1 SCREENING EXAMINATION FOR PULMONARY TUBERCULOSIS: Primary | ICD-10-CM

## 2020-07-02 LAB
PPDINDURATION: 0 MM (ref 0–5)
PPDREDNESS: 9 MM

## 2020-07-02 PROCEDURE — 99207 ZZC NO CHARGE NURSE ONLY: CPT

## 2020-07-02 NOTE — PROGRESS NOTES
[facility] result:  Lab Results   Component Value Date    PPDREDNESS 9 07/02/2020    PPDINDURATIO 0 07/02/2020     Is induration greater than 5mm?  Crystal Reyes RN

## 2020-07-15 ENCOUNTER — OFFICE VISIT (OUTPATIENT)
Dept: FAMILY MEDICINE | Facility: CLINIC | Age: 25
End: 2020-07-15
Payer: COMMERCIAL

## 2020-07-15 VITALS
WEIGHT: 257.2 LBS | RESPIRATION RATE: 20 BRPM | SYSTOLIC BLOOD PRESSURE: 120 MMHG | DIASTOLIC BLOOD PRESSURE: 60 MMHG | HEART RATE: 83 BPM | TEMPERATURE: 97.7 F | OXYGEN SATURATION: 98 % | HEIGHT: 65 IN | BODY MASS INDEX: 42.85 KG/M2

## 2020-07-15 DIAGNOSIS — Z00.00 ROUTINE GENERAL MEDICAL EXAMINATION AT A HEALTH CARE FACILITY: Primary | ICD-10-CM

## 2020-07-15 DIAGNOSIS — E66.01 MORBID OBESITY (H): ICD-10-CM

## 2020-07-15 LAB
GLUCOSE SERPL-MCNC: 106 MG/DL (ref 70–99)
SPECIMEN SOURCE: NORMAL
WET PREP SPEC: NORMAL

## 2020-07-15 PROCEDURE — 82947 ASSAY GLUCOSE BLOOD QUANT: CPT | Performed by: PHYSICIAN ASSISTANT

## 2020-07-15 PROCEDURE — 36415 COLL VENOUS BLD VENIPUNCTURE: CPT | Performed by: PHYSICIAN ASSISTANT

## 2020-07-15 PROCEDURE — G0124 SCREEN C/V THIN LAYER BY MD: HCPCS | Performed by: PHYSICIAN ASSISTANT

## 2020-07-15 PROCEDURE — G0145 SCR C/V CYTO,THINLAYER,RESCR: HCPCS | Performed by: PHYSICIAN ASSISTANT

## 2020-07-15 PROCEDURE — 99395 PREV VISIT EST AGE 18-39: CPT | Performed by: PHYSICIAN ASSISTANT

## 2020-07-15 PROCEDURE — 87210 SMEAR WET MOUNT SALINE/INK: CPT | Performed by: PHYSICIAN ASSISTANT

## 2020-07-15 ASSESSMENT — MIFFLIN-ST. JEOR: SCORE: 1917.53

## 2020-07-15 NOTE — PROGRESS NOTES
SUBJECTIVE:   CC: Tami Almaraz is an 24 year old woman who presents for preventive health visit.     Healthy Habits:    Do you get at least three servings of calcium containing foods daily (dairy, green leafy vegetables, etc.)? yes    Amount of exercise or daily activities, outside of work: none    Problems taking medications regularly N/A    Medication side effects: N/A    Have you had an eye exam in the past two years? no    Do you see a dentist twice per year? no    Do you have sleep apnea, excessive snoring or daytime drowsiness?yes- DAYTIME DROWSINESS       -------------------------------------    Today's PHQ-2 Score:   PHQ-2 ( 1999 [employer]) 7/15/2020   Q1: Little interest or pleasure in doing things 0   Q2: Feeling down, depressed or hopeless 0   PHQ-2 Score 0       Abuse: Current or Past(Physical, Sexual or Emotional)- No  Do you feel safe in your environment? Yes        Social History     Tobacco Use     Smoking status: Never Smoker     Smokeless tobacco: Never Used   Substance Use Topics     Alcohol use: Not Currently     If you drink alcohol do you typically have >3 drinks per day or >7 drinks per week? No                         ROS:  CONSTITUTIONAL: NEGATIVE for fever, chills, change in weight  INTEGUMENTARU/SKIN: NEGATIVE for worrisome rashes, moles or lesions  EYES: NEGATIVE for vision changes or irritation  ENT: NEGATIVE for ear, mouth and throat problems  RESP: NEGATIVE for significant cough or SOB  BREAST: NEGATIVE for masses, tenderness or discharge  CV: NEGATIVE for chest pain, palpitations or peripheral edema  GI: NEGATIVE for nausea, abdominal pain, heartburn, or change in bowel habits  : NEGATIVE for unusual urinary or vaginal symptoms. Periods are regular.  MUSCULOSKELETAL: NEGATIVE for significant arthralgias or myalgia  NEURO: NEGATIVE for weakness, dizziness or paresthesias  ENDOCRINE: NEGATIVE for temperature intolerance, skin/hair changes  PSYCHIATRIC: NEGATIVE for changes in mood or  "affect    OBJECTIVE:   /60   Pulse 83   Temp 97.7  F (36.5  C) (Temporal)   Resp 20   Ht 1.651 m (5' 5\")   Wt 116.7 kg (257 lb 3.2 oz)   LMP 05/27/2020   SpO2 98%   BMI 42.80 kg/m    EXAM:  GENERAL: healthy, alert and no distress  EYES: Eyes grossly normal to inspection, PERRL and conjunctivae and sclerae normal  HENT: ear canals and TM's normal, nose and mouth without ulcers or lesions  NECK: no adenopathy, no asymmetry, masses, or scars and thyroid normal to palpation  RESP: lungs clear to auscultation - no rales, rhonchi or wheezes  BREAST: normal without masses, tenderness or nipple discharge and no palpable axillary masses or adenopathy  CV: regular rate and rhythm, normal S1 S2, no S3 or S4, no murmur, click or rub, no peripheral edema and peripheral pulses strong  ABDOMEN: soft, nontender, no hepatosplenomegaly, no masses and bowel sounds normal   (female): normal female external genitalia, normal urethral meatus, vaginal mucosa pink, moist, well rugated, and normal cervix/adnexa/uterus without masses or discharge  MS: no gross musculoskeletal defects noted, no edema  SKIN: no suspicious lesions or rashes  NEURO: Normal strength and tone, mentation intact and speech normal  PSYCH: mentation appears normal, affect normal/bright    Diagnostic Test Results:  Results for orders placed or performed in visit on 07/15/20 (from the past 24 hour(s))   Wet prep    Specimen: Vagina   Result Value Ref Range    Specimen Description Vagina     Wet Prep No Trichomonas seen     Wet Prep No clue cells seen     Wet Prep No yeast seen     Wet Prep WBC'S seen  Few      Glucose   Result Value Ref Range    Glucose 106 (H) 70 - 99 mg/dL       ASSESSMENT/PLAN:   1. Routine general medical examination at a health care facility  - PAP imaged thin layer screen only - recommended age 21 - 24 years  - Wet prep    2. Morbid obesity (H)  - NUTRITION REFERRAL  - Glucose    COUNSELING:   Reviewed preventive health counseling, " "as reflected in patient instructions    Estimated body mass index is 42.8 kg/m  as calculated from the following:    Height as of this encounter: 1.651 m (5' 5\").    Weight as of this encounter: 116.7 kg (257 lb 3.2 oz).    Weight management plan: Patient referred to endocrine and/or weight management specialty     reports that she has never smoked. She has never used smokeless tobacco.      Counseling Resources:  ATP IV Guidelines  Pooled Cohorts Equation Calculator  Breast Cancer Risk Calculator  FRAX Risk Assessment  ICSI Preventive Guidelines  Dietary Guidelines for Americans, 2010  USDA's MyPlate  ASA Prophylaxis  Lung CA Screening    Deshawn Gudino PA-C  [facility]  "

## 2020-07-20 LAB
COPATH REPORT: ABNORMAL
PAP: ABNORMAL

## 2020-07-21 ENCOUNTER — PATIENT OUTREACH (OUTPATIENT)
Dept: FAMILY MEDICINE | Facility: CLINIC | Age: 25
End: 2020-07-21

## 2020-07-21 DIAGNOSIS — R87.612 PAPANICOLAOU SMEAR OF CERVIX WITH LOW GRADE SQUAMOUS INTRAEPITHELIAL LESION (LGSIL): ICD-10-CM

## 2020-07-21 NOTE — TELEPHONE ENCOUNTER
2017, 2018 NIL Paps (Care Everywhere)  7/15/20 LGSIL Pap at 25 yo. Plan pap only in 1 year.   7/21/20 MyChart result note sent

## 2020-07-29 ENCOUNTER — OFFICE VISIT (OUTPATIENT)
Dept: FAMILY MEDICINE | Facility: CLINIC | Age: 25
End: 2020-07-29
Payer: COMMERCIAL

## 2020-07-29 VITALS
WEIGHT: 257.8 LBS | SYSTOLIC BLOOD PRESSURE: 110 MMHG | HEART RATE: 60 BPM | BODY MASS INDEX: 42.9 KG/M2 | TEMPERATURE: 98.1 F | OXYGEN SATURATION: 98 % | DIASTOLIC BLOOD PRESSURE: 60 MMHG

## 2020-07-29 DIAGNOSIS — L73.9 FOLLICULITIS: ICD-10-CM

## 2020-07-29 DIAGNOSIS — N91.2 AMENORRHEA: Primary | ICD-10-CM

## 2020-07-29 LAB
FSH SERPL-ACNC: 3.4 IU/L
HCG SERPL QL: NEGATIVE
PROLACTIN SERPL-MCNC: 6 UG/L (ref 3–27)

## 2020-07-29 PROCEDURE — 83001 ASSAY OF GONADOTROPIN (FSH): CPT | Performed by: PHYSICIAN ASSISTANT

## 2020-07-29 PROCEDURE — 84443 ASSAY THYROID STIM HORMONE: CPT | Performed by: PHYSICIAN ASSISTANT

## 2020-07-29 PROCEDURE — 84146 ASSAY OF PROLACTIN: CPT | Performed by: PHYSICIAN ASSISTANT

## 2020-07-29 PROCEDURE — 84403 ASSAY OF TOTAL TESTOSTERONE: CPT | Performed by: PHYSICIAN ASSISTANT

## 2020-07-29 PROCEDURE — 99213 OFFICE O/P EST LOW 20 MIN: CPT | Performed by: PHYSICIAN ASSISTANT

## 2020-07-29 PROCEDURE — 36415 COLL VENOUS BLD VENIPUNCTURE: CPT | Performed by: PHYSICIAN ASSISTANT

## 2020-07-29 PROCEDURE — 84703 CHORIONIC GONADOTROPIN ASSAY: CPT | Performed by: PHYSICIAN ASSISTANT

## 2020-07-29 NOTE — PROGRESS NOTES
Subjective     Tami Almaraz is a 24 year old female who presents to clinic today for the following health issues:    HPI       Amenorrhea    Patient has noticed no menses and weight gain for 2 months.  Also c/o a draining cyst near her L labia majora x several days.     Review of Systems   Constitutional, HEENT, cardiovascular, pulmonary, gi and gu systems are negative, except as otherwise noted.      Objective    /60   Pulse 60   Temp 98.1  F (36.7  C) (Oral)   Wt 116.9 kg (257 lb 12.8 oz)   SpO2 98%   BMI 42.90 kg/m    Body mass index is 42.9 kg/m .  Physical Exam   GENERAL: healthy, alert and no distress   (female): normal female external genitalia and vaginal skin findings: erythematous resolving papular lesion haired skin of shaved labia.     Diagnostic Test Results:  Labs reviewed in Epic        Assessment & Plan       ICD-10-CM    1. Amenorrhea  N91.2 Prolactin     TSH with free T4 reflex     Testosterone, total     Follicle stimulating hormone     HCG qualitative   2. Folliculitis  L73.9       Use electric razor and antibacterial soap  Follow up on labs.  Patient amenable to this follow up plan.     Return if symptoms worsen or fail to improve.    Deshawn Gudino PA-C  [facility]

## 2020-07-30 LAB — TSH SERPL DL<=0.005 MIU/L-ACNC: 0.52 MU/L (ref 0.4–4)

## 2020-07-31 LAB — TESTOST SERPL-MCNC: 22 NG/DL (ref 8–60)

## 2020-08-19 ENCOUNTER — E-VISIT (OUTPATIENT)
Dept: FAMILY MEDICINE | Facility: CLINIC | Age: 25
End: 2020-08-19
Payer: COMMERCIAL

## 2020-08-19 DIAGNOSIS — N91.2 AMENORRHEA: Primary | ICD-10-CM

## 2020-08-20 NOTE — PROGRESS NOTES
"Tami Almaraz is a 24 year old female who is being evaluated via a billable telephone visit.      The patient has been notified of following:     \"This telephone visit will be conducted via a call between you and your physician/provider. We have found that certain health care needs can be provided without the need for a physical exam.  This service lets us provide the care you need with a short phone conversation.  If a prescription is necessary we can send it directly to your pharmacy.  If lab work is needed we can place an order for that and you can then stop by our lab to have the test done at a later time.    Telephone visits are billed at different rates depending on your insurance coverage. During this emergency period, for some insurers they may be billed the same as an in-person visit.  Please reach out to your insurance provider with any questions.    If during the course of the call the physician/provider feels a telephone visit is not appropriate, you will not be charged for this service.\"    Patient has given verbal consent for Telephone visit?  Yes    What phone number would you like to be contacted at? [phone]    How would you like to obtain your AVS? MyChart    Subjective     Tami Almaraz is a 24 year old female who presents via phone visit today for the following health issues:    HPI    Patient presents with:  RECHECK: Amenorrhea      Lab review performed.  Patient amenable to referral for additional work up.     Review of Systems   Constitutional, HEENT, cardiovascular, pulmonary, gi and gu systems are negative, except as otherwise noted.       Objective          Vitals:  No vitals were obtained today due to virtual visit.    healthy, alert and no distress  PSYCH: Alert and oriented times 3; coherent speech, normal   rate and volume, able to articulate logical thoughts, able   to abstract reason, no tangential thoughts, no hallucinations   or delusions    RESP: No cough, no audible wheezing, " able to talk in full sentences  Remainder of exam unable to be completed due to telephone visits            Assessment/Plan:    Assessment & Plan     Tami was seen today for recheck.    Diagnoses and all orders for this visit:    Amenorrhea, unspecified  -     ENDOCRINOLOGY ADULT REFERRAL               Return per endocrinology recommendations.    Deshawn Gudino PA-C  [facility]    Phone call duration:  10 minutes

## 2020-08-21 ENCOUNTER — VIRTUAL VISIT (OUTPATIENT)
Dept: FAMILY MEDICINE | Facility: CLINIC | Age: 25
End: 2020-08-21
Payer: COMMERCIAL

## 2020-08-21 DIAGNOSIS — N91.2 AMENORRHEA, UNSPECIFIED: Primary | ICD-10-CM

## 2020-08-21 PROCEDURE — 99213 OFFICE O/P EST LOW 20 MIN: CPT | Mod: 95 | Performed by: PHYSICIAN ASSISTANT

## 2020-08-21 NOTE — TELEPHONE ENCOUNTER
Patient scheduled for telephone visit today, 8-, at 10 a.m., with VELIA Villafana. Sravanthi Murillo,

## 2020-08-25 ENCOUNTER — VIRTUAL VISIT (OUTPATIENT)
Dept: ENDOCRINOLOGY | Facility: CLINIC | Age: 25
End: 2020-08-25
Payer: COMMERCIAL

## 2020-08-25 DIAGNOSIS — N91.2 AMENORRHEA: Primary | ICD-10-CM

## 2020-08-25 PROCEDURE — 99202 OFFICE O/P NEW SF 15 MIN: CPT | Mod: 95 | Performed by: INTERNAL MEDICINE

## 2020-08-25 NOTE — NURSING NOTE
"Chief Complaint   Patient presents with     New Patient     Amenorrhea       Initial There were no vitals taken for this visit. Estimated body mass index is 42.9 kg/m  as calculated from the following:    Height as of 7/15/20: 1.651 m (5' 5\").    Weight as of 7/29/20: 116.9 kg (257 lb 12.8 oz).  BP completed using cuff size: NA (Not Taken)  Medications and allergies reviewed.      Evelin DALEY MA    "

## 2020-08-25 NOTE — LETTER
2020         RE: Tami Almaraz  [address]  [place] [address]        Dear Colleague,    Thank you for referring your patient, Tami Almaraz, to the [facility]. Please see a copy of my visit note below.    CC: Amenorrhea     HPI:   Patient presents for evaluation of amenorrhea.   She had an  in 2020.   She had a previous miscarriage and one living child.     Chronic issue of irregular menses.   Notes menses began before most of her friends, in 5th grade.     No history of GDM.     She had a menses in 2020 but nothing since.     She does not take any medications or supplements.     Weight is up ~25 pounds since the spring.   Noes no change in lifestyle.     No issues with body or facial hair.   Recently she has been having more severe acne.     ROS: 10 point ROS neg other than the symptoms noted above in the HPI.    PMH:   Patient Active Problem List   Diagnosis     Abdominal pain     Morbid obesity (H)     Papanicolaou smear of cervix with low grade squamous intraepithelial lesion (LGSIL)     Meds:  No current outpatient medications on file.     No current facility-administered medications for this visit.      FHX:   No known reproductive disease.   No PCOS.   Both parents have DM.     SHX:  Studies social work.     Exam:   GENERAL: Healthy, alert and no distress  EYES: Eyes grossly normal to inspection.  No discharge or erythema, or obvious scleral/conjunctival abnormalities.  HENT: Normal cephalic/atraumatic.  External ears, nose and mouth without ulcers or lesions.  No nasal drainage visible.  RESP: No audible wheeze, cough, or visible cyanosis.  No visible retractions or increased work of breathing.    MS: No gross musculoskeletal defects noted.  Normal range of motion.  No visible edema.  SKIN: Visible skin clear. No significant rash, abnormal pigmentation or lesions.  NEURO: Cranial nerves grossly intact.  Mentation and speech appropriate for age.  PSYCH:  Mentation appears normal, affect normal/bright, judgement and insight intact, normal speech and appearance well-groomed.      A/P:   Amenorrhea - History of irregular menses. Now none in the last two months and one in the last six months. Prolactin and TSH normal. HCG negative. FSH is normal. Total testosterone normal. Pelvic US in 4/2018 did not mention ovarian cysts. Discussed possible PCOS diagnosis.   -Labs for salivary cortisol, estradiol, FSH, LH, AMH, DHEA, total and free testosterone, 17-OH progesterone.         Due to the COVID 19 pandemic this visit was a telephone/video visit in order to help prevent spread of infection in this high risk patient and the general population. The patient gave verbal consent for the visit today.    Start time 1131  Stop time 1154  Total time 23    Pito Nolan MD on 8/25/2020 at 12:39 PM        Again, thank you for allowing me to participate in the care of your patient.        Sincerely,        Pito Nolan MD

## 2020-08-25 NOTE — PROGRESS NOTES
CC: Amenorrhea     HPI:   Patient presents for evaluation of amenorrhea.   She had an  in 2020.   She had a previous miscarriage and one living child.     Chronic issue of irregular menses.   Notes menses began before most of her friends, in 5th grade.     No history of GDM.     She had a menses in 2020 but nothing since.     She does not take any medications or supplements.     Weight is up ~25 pounds since the spring.   Noes no change in lifestyle.     No issues with body or facial hair.   Recently she has been having more severe acne.     ROS: 10 point ROS neg other than the symptoms noted above in the HPI.    PMH:   Patient Active Problem List   Diagnosis     Abdominal pain     Morbid obesity (H)     Papanicolaou smear of cervix with low grade squamous intraepithelial lesion (LGSIL)     Meds:  No current outpatient medications on file.     No current facility-administered medications for this visit.      FHX:   No known reproductive disease.   No PCOS.   Both parents have DM.     SHX:  Studies social work.     Exam:   GENERAL: Healthy, alert and no distress  EYES: Eyes grossly normal to inspection.  No discharge or erythema, or obvious scleral/conjunctival abnormalities.  HENT: Normal cephalic/atraumatic.  External ears, nose and mouth without ulcers or lesions.  No nasal drainage visible.  RESP: No audible wheeze, cough, or visible cyanosis.  No visible retractions or increased work of breathing.    MS: No gross musculoskeletal defects noted.  Normal range of motion.  No visible edema.  SKIN: Visible skin clear. No significant rash, abnormal pigmentation or lesions.  NEURO: Cranial nerves grossly intact.  Mentation and speech appropriate for age.  PSYCH: Mentation appears normal, affect normal/bright, judgement and insight intact, normal speech and appearance well-groomed.      A/P:   Amenorrhea - History of irregular menses. Now none in the last two months and one in the last six months. Prolactin  and TSH normal. HCG negative. FSH is normal. Total testosterone normal. Pelvic US in 4/2018 did not mention ovarian cysts. Discussed possible PCOS diagnosis.   -Labs for salivary cortisol, estradiol, FSH, LH, AMH, DHEA, total and free testosterone, 17-OH progesterone.         Due to the COVID 19 pandemic this visit was a telephone/video visit in order to help prevent spread of infection in this high risk patient and the general population. The patient gave verbal consent for the visit today.    Start time 1131  Stop time 1154  Total time 23    Pito Nolan MD on 8/25/2020 at 12:39 PM

## 2020-08-28 DIAGNOSIS — N91.2 AMENORRHEA: ICD-10-CM

## 2020-08-28 LAB
ESTRADIOL SERPL-MCNC: 141 PG/ML
FSH SERPL-ACNC: 1.5 IU/L
LH SERPL-ACNC: 1.3 IU/L

## 2020-08-28 PROCEDURE — 84270 ASSAY OF SEX HORMONE GLOBUL: CPT | Performed by: FAMILY MEDICINE

## 2020-08-28 PROCEDURE — 36415 COLL VENOUS BLD VENIPUNCTURE: CPT | Performed by: FAMILY MEDICINE

## 2020-08-28 PROCEDURE — 83520 IMMUNOASSAY QUANT NOS NONAB: CPT | Mod: 90 | Performed by: FAMILY MEDICINE

## 2020-08-28 PROCEDURE — 99000 SPECIMEN HANDLING OFFICE-LAB: CPT | Performed by: FAMILY MEDICINE

## 2020-08-28 PROCEDURE — 82670 ASSAY OF TOTAL ESTRADIOL: CPT | Performed by: FAMILY MEDICINE

## 2020-08-28 PROCEDURE — 83002 ASSAY OF GONADOTROPIN (LH): CPT | Performed by: FAMILY MEDICINE

## 2020-08-28 PROCEDURE — 84403 ASSAY OF TOTAL TESTOSTERONE: CPT | Performed by: FAMILY MEDICINE

## 2020-08-28 PROCEDURE — 83498 ASY HYDROXYPROGESTERONE 17-D: CPT | Performed by: FAMILY MEDICINE

## 2020-08-28 PROCEDURE — 82627 DEHYDROEPIANDROSTERONE: CPT | Performed by: FAMILY MEDICINE

## 2020-08-28 PROCEDURE — 83001 ASSAY OF GONADOTROPIN (FSH): CPT | Performed by: FAMILY MEDICINE

## 2020-08-28 NOTE — LETTER
September 1, 2020      Tami Almaraz  [address]   [place] [facility] [address]        Dear ,    We are writing to inform you of your test results.    All labs were normal.   Complete saliva collections as planned.     Resulted Orders   Testosterone Free and Total   Result Value Ref Range    Testosterone Total 35 8 - 60 ng/dL      Comment:      This test was developed and its performance characteristics determined by the   [facility].   It has not been cleared or approved by the FDA. The laboratory is regulated   under CLIA as qualified to perform high-complexity testing. This test is used   for clinical purposes. It should not be regarded as investigational or for   research.      Sex Hormone Binding Globulin 53 30 - 135 nmol/L    Free Testosterone Calculated 0.45 0.08 - 0.74 ng/dL      Comment:      18-30 Years 0.08-0.74 ng/dL  31-40 Years 0.13-0.92 ng/dL  41-51 Years 0.11-0.58 ng/dL  Postmenopausal 0.06-0.38 ng/dL     Lutropin   Result Value Ref Range    Lutropin 1.3 IU/L      Comment:      LH Reference Range  Female: Follicular      1.9-12.5          Mid-cycle       8.7-76.3          Luteal          0.5-16.9          Postmenopausal  15.9-54.0     17 OH progesterone   Result Value Ref Range    17-OH Progesterone 14 ng/dL      Comment:      Female Reference Ranges:  <100 ng/dL prepubertal  <80 ng/dL follicular  <285 ng/dL luteal  < 51 ng/dL postmenopausal  This test was developed and its performance characteristics determined by the   [facility].   It has not been cleared or approved by the FDA. The laboratory is regulated   under CLIA as qualified to perform high-complexity testing. This test is used   for clinical purposes. It should not be regarded as investigational or for   research.     Anti-Mullerian hormone   Result Value Ref Range    Anti-Mullerian Hormone 1.559  0.401 - 16.015 ng/mL      Comment:      (Note)  INTERPRETIVE INFORMATION: Anti-Mullerian Hormone  FEMALE:  6 months - 14 years: 0.256 - 6.345 ng/mL  15-17 years:         0.861 - 10.451 ng/mL  18-29 years:         0.401 - 16.015 ng/mL  30-39 years:         0.176 - 11.705 ng/mL  40-45 years:         6.282 ng/mL or less  46-50 years:         0.064 ng/mL or less  Post-menopausal:     0.003 ng/mL or less  MALE:  6-11 months:         56.677 - 495.299 ng/mL  1-6 years:           33.442 - 342.450 ng/mL  7-9 years:           20.245 - 189.781 ng/mL  10-12 years:         2.903 - 178.243 ng/mL  13 years and older:  2.079 - 30.656 ng/mL  Test developed and characteristics determined by [facility]. See Compliance Statement D: [employer][email or web address]/CS  Performed By: [facility]  [address] [place] [id number]  : Gavin Lunsford MD, MS     DHEA sulfate   Result Value Ref Range    DHEA Sulfate 152 35 - 430 ug/dL   Estradiol   Result Value Ref Range    Estradiol 141 pg/mL      Comment:      Estradiol reference ranges for pre-menopausal  Follicular     pg/mL  Mid-cycle    pg/mL  Luteal          pg/mL     Follicle stimulating hormone   Result Value Ref Range    FSH 1.5 IU/L      Comment:      FSH Reference Range  Female: Follicular      2.5-10.2          Mid-cycle       3.4-33.4          Luteal          1.5-9.1          Postmenopausal  23.0-116.3         If you have any questions or concerns, please call the clinic at the number listed above.       Sincerely,      Pito Nolan MD

## 2020-08-30 LAB — MIS SERPL-MCNC: 1.56 NG/ML (ref 0.4–16.02)

## 2020-08-31 LAB
17OHP SERPL-MCNC: 14 NG/DL
DHEA-S SERPL-MCNC: 152 UG/DL (ref 35–430)

## 2020-08-31 PROCEDURE — 99000 SPECIMEN HANDLING OFFICE-LAB: CPT | Performed by: FAMILY MEDICINE

## 2020-08-31 PROCEDURE — 82530 CORTISOL FREE: CPT | Mod: 90 | Performed by: FAMILY MEDICINE

## 2020-09-01 LAB
SHBG SERPL-SCNC: 53 NMOL/L (ref 30–135)
TESTOST FREE SERPL-MCNC: 0.45 NG/DL (ref 0.08–0.74)
TESTOST SERPL-MCNC: 35 NG/DL (ref 8–60)

## 2020-09-01 PROCEDURE — 99000 SPECIMEN HANDLING OFFICE-LAB: CPT | Performed by: FAMILY MEDICINE

## 2020-09-02 DIAGNOSIS — N91.2 AMENORRHEA: ICD-10-CM

## 2020-09-04 LAB
COLLECT TME SPEC: NORMAL
COLLECT TME SPEC: NORMAL
CORTIS SAL-MCNC: 0.04 UG/DL
CORTIS SAL-MCNC: 0.06 UG/DL

## 2020-09-08 DIAGNOSIS — N91.2 AMENORRHEA: Primary | ICD-10-CM

## 2020-09-10 ENCOUNTER — ANCILLARY PROCEDURE (OUTPATIENT)
Dept: ULTRASOUND IMAGING | Facility: CLINIC | Age: 25
End: 2020-09-10
Attending: INTERNAL MEDICINE
Payer: COMMERCIAL

## 2020-09-10 DIAGNOSIS — N91.2 AMENORRHEA: ICD-10-CM

## 2020-09-10 PROCEDURE — 76856 US EXAM PELVIC COMPLETE: CPT

## 2020-09-10 PROCEDURE — 76830 TRANSVAGINAL US NON-OB: CPT

## 2020-09-11 DIAGNOSIS — N91.2 AMENORRHEA: Primary | ICD-10-CM

## 2020-09-11 DIAGNOSIS — D25.9 UTERINE LEIOMYOMA, UNSPECIFIED LOCATION: ICD-10-CM

## 2021-01-03 ENCOUNTER — HEALTH MAINTENANCE LETTER (OUTPATIENT)
Age: 26
End: 2021-01-03

## 2021-04-08 ENCOUNTER — OFFICE VISIT (OUTPATIENT)
Dept: FAMILY MEDICINE | Facility: CLINIC | Age: 26
End: 2021-04-08
Payer: COMMERCIAL

## 2021-04-08 VITALS
HEIGHT: 65 IN | OXYGEN SATURATION: 96 % | SYSTOLIC BLOOD PRESSURE: 119 MMHG | TEMPERATURE: 97.6 F | DIASTOLIC BLOOD PRESSURE: 76 MMHG | WEIGHT: 262 LBS | BODY MASS INDEX: 43.65 KG/M2 | HEART RATE: 69 BPM | RESPIRATION RATE: 16 BRPM

## 2021-04-08 DIAGNOSIS — R23.8 SKIN IRRITATION: Primary | ICD-10-CM

## 2021-04-08 PROCEDURE — 99214 OFFICE O/P EST MOD 30 MIN: CPT | Performed by: FAMILY MEDICINE

## 2021-04-08 RX ORDER — BETAMETHASONE DIPROPIONATE 0.5 MG/G
OINTMENT, AUGMENTED TOPICAL 2 TIMES DAILY
Qty: 50 G | Refills: 3 | Status: SHIPPED | OUTPATIENT
Start: 2021-04-08 | End: 2021-06-11

## 2021-04-08 RX ORDER — KETOCONAZOLE 20 MG/G
CREAM TOPICAL 2 TIMES DAILY
Qty: 60 G | Refills: 3 | Status: SHIPPED | OUTPATIENT
Start: 2021-04-08 | End: 2021-06-11

## 2021-04-08 ASSESSMENT — MIFFLIN-ST. JEOR: SCORE: 1934.3

## 2021-04-08 ASSESSMENT — PAIN SCALES - GENERAL: PAINLEVEL: NO PAIN (0)

## 2021-04-08 NOTE — PROGRESS NOTES
"    Danyel Garrett is a 25 year old who presents for the following health issues:    HPI     Concern - right foot  Onset:  Ongoing for a year, itching on and off  Description: itching right foot   Intensity: moderate  Progression of Symptoms:  worsening and intermittent  Accompanying Signs & Symptoms: itching, sores when scratching  Previous history of similar problem: no  Precipitating factors:        Worsened by: scratching  Alleviating factors:        Improved by: none.  Therapies tried and outcome: tea tree oil, otc athletes foot, soaking in vinegar/ bleach water.    Review of Systems   Constitutional, HEENT, cardiovascular, pulmonary, GI, , musculoskeletal, neuro, skin, endocrine and psych systems are negative, except as otherwise noted.      Objective    /76 (BP Location: Right arm, Patient Position: Sitting, Cuff Size: Adult Large)   Pulse 69   Temp 97.6  F (36.4  C) (Tympanic)   Resp 16   Ht 1.651 m (5' 5\")   Wt 118.8 kg (262 lb)   LMP 04/03/2021 (Approximate)   SpO2 96%   BMI 43.60 kg/m    Body mass index is 43.6 kg/m .  Physical Exam   GENERAL: healthy, alert and no distress  NECK: no adenopathy, no asymmetry, masses, or scars and thyroid normal to palpation  RESP: lungs clear to auscultation - no rales, rhonchi or wheezes  CV: regular rate and rhythm, normal S1 S2, no S3 or S4, no murmur, click or rub, no peripheral edema and peripheral pulses strong  ABDOMEN: soft, nontender, no hepatosplenomegaly, no masses and bowel sounds normal  MS: no gross musculoskeletal defects noted, no edema  SKIN: hyperpigmented patches plantar right foot    A/P:  (R23.8) Skin irritation  (primary encounter diagnosis)  Comment:   Plan: ketoconazole (NIZORAL) 2 % external cream,         augmented betamethasone dipropionate         (DIPROLENE-AF) 0.05 % external ointment,         DERMATOLOGY ADULT REFERRAL        Lichen planus? Treat with topical steroid with antifungal. If no improvements, would like patient " to see Dermatology.    Levon Hilton MD

## 2021-04-08 NOTE — PATIENT INSTRUCTIONS
At [facility], we strive to deliver an exceptional experience to you, every time we see you. If you receive a survey, please complete it as we do value your feedback.  If you have MyChart, you can expect to receive results automatically within 24 hours of their completion.  Your provider will send a note interpreting your results as well.   If you do not have MyChart, you should receive your results in about a week by mail.    Your care team:                            Family Medicine Internal Medicine   MD Burt Joseph MD Shantel Branch-Fleming, MD Srinivasa Vaka, MD Katya Belousova, PACOOKIE Long, APRN CNP    Levon Hilton, MD Pediatrics   Romero Brown, PACOOKIE Rahman, CNP MD Christina De Leon APRN CNP   MD Dodie Schroeder MD Deborah Mielke, MD Cristiana Duran, APRN [facility]      Clinic hours: Monday - Thursday 7 am-6 pm; Fridays 7 am-5 pm.   Urgent care: Monday - Friday 10 am- 8 pm; Saturday and Sunday 9 am-5 pm.    Clinic: [phone]       [facility]: Monday - Thursday 8 am - 7 pm; Friday 8 am - 6 pm  [facility]: [phone]     Use [email or web address] for 24/7 diagnosis and treatment of dozens of conditions.

## 2021-04-15 NOTE — TELEPHONE ENCOUNTER
[address], [address]  [place], [place]  Phone [phone]     Neurology Progress Note  4/15/2021 9:18 AM  Information:   Patient Name: Michael Kevin  :   1963  Age:   62 y.o. MRN:   [id number]  Account #:  [de-identified]  Admit Date:   2021  Today:  4/15/21     ADMIT DX:   Stroke-like symptoms    Subjective:     Michael Kevin is a 62y.o. year old man with a history of chronic atrial fibrillation and anticoagulation who presented with altered mental status and hypoglycemia. Interval History:   He is improved this am but still has abnormal speech pattern and generalized weakness.       Objective:     Past Medical History:  Past Medical History:   Diagnosis Date    Acalculous cholecystitis 2015    Allergic rhinitis, cause unspecified     Atrial fibrillation, chronic (HCC)     sees [facility] cardiology    Charcot's joint of right foot     Chicken pox     Closed supracondylar fracture of elbow     fall    History of MRSA infection     scalp/facial and on back    HTN (hypertension)     Hyperlipidemia     Impotence of organic origin     MDRO (multiple drug resistant organisms) resistance     Other dyspnea and respiratory abnormality     Other specified erythematous condition(695.89)     Personal history of other infectious and parasitic disease     Salmonella     Type II or unspecified type diabetes mellitus without mention of complication, uncontrolled        Past Surgical History:   Procedure Laterality Date    CARDIOVERSION  2017    COLONOSCOPY  2020    Dr Flores Done Fraire-Melanosis coli throughout the entire colon-AP, 5 yr recall    HUMERUS FRACTURE SURGERY Left 2020    OPEN REDUCTION INTERNAL FIXATION LEFT SUPRACONDYLAR FRACTURE performed by Stan [facility]gs [facility] at [facility] OR    MALIGNANT SKIN LESION EXCISION      X 2    TONSILLECTOMY         Family History   Problem Relation Age of Onset    Stroke Maternal Grandmother     Cancer Maternal Called patient and left VM to call clinic. Please help schedule no period for 3 month telephone appointment if patient returns call.  Rashida SOSA CMA ([facility])     Grandmother     Stroke Paternal Grandmother     Diabetes Paternal Grandmother     Cancer Father     Heart Disease Father     Lung Cancer Father     Asthma Paternal Grandfather     Heart Disease Paternal Grandfather     Heart Disease Maternal Grandfather     Colon Cancer Neg Hx     Colon Polyps Neg Hx     Esophageal Cancer Neg Hx     Liver Cancer Neg Hx     Liver Disease Neg Hx     Rectal Cancer Neg Hx     Stomach Cancer Neg Hx        Social History     Socioeconomic History    Marital status:      Spouse name: Not on file    Number of children: Not on file    Years of education: Not on file    Highest education level: Not on file   Occupational History    Not on file   Social Needs    Financial resource strain: Hard    Food insecurity     Worry: Sometimes true     Inability: Sometimes true    Transportation needs     Medical: No     Non-medical: No   Tobacco Use    Smoking status: Never Smoker    Smokeless tobacco: Never Used   Substance and Sexual Activity    Alcohol use: No    Drug use: No    Sexual activity: Not on file   Lifestyle    Physical activity     Days per week: 0 days     Minutes per session: 0 min    Stress:  To some extent   Relationships    Social connections     Talks on phone: Not on file     Gets together: Not on file     Attends Cheondoism service: Not on file     Active member of club or organization: Not on file     Attends meetings of clubs or organizations: Not on file     Relationship status: Not on file    Intimate partner violence     Fear of current or ex partner: Not on file     Emotionally abused: Not on file     Physically abused: Not on file     Forced sexual activity: Not on file   Other Topics Concern    Not on file   Social History Narrative    Referred to  for assistance with Meals on Wheels       Medications:   dextrose      dextrose 50 mL/hr at 04/15/21 0415    sodium chloride        docusate sodium  100 mg Oral BID    tamsulosin  0.8 mg Oral Daily    atorvastatin  20 mg Oral Daily    Vitamin D  1,000 Units Oral Daily    metoprolol tartrate  25 mg Oral BID    losartan  100 mg Oral Daily    sodium chloride flush  10 mL Intravenous 2 times per day    aspirin  81 mg Oral Daily    Or    aspirin  300 mg Rectal Daily    warfarin (COUMADIN) daily dosing (placeholder)   Other RX Placeholder       Diagnostic Studies:  Reviewed all labs/diagnositcs since last 24hrs:  Recent Results (from the past 24 hour(s))   POCT Glucose    Collection Time: 04/14/21  2:40 PM   Result Value Ref Range    POC Glucose 98 70 - 99 mg/dl    Performed on AccuChek    EKG 12 Lead    Collection Time: 04/14/21  3:02 PM   Result Value Ref Range    P-R Interval  ms    QRS Duration 94 ms    Q-T Interval 382 ms    QTc Calculation (Bazett) 445 ms    P Axis  degrees    T Axis 11 degrees   CBC Auto Differential    Collection Time: 04/14/21  3:04 PM   Result Value Ref Range    WBC 10.7 4.8 - 10.8 K/uL    RBC 4.53 (L) 4.70 - 6.10 M/uL    Hemoglobin 14.3 14.0 - 18.0 g/dL    Hematocrit 43.6 42.0 - 52.0 %    MCV 96.2 (H) 80.0 - 94.0 fL    MCH 31.6 (H) 27.0 - 31.0 pg    MCHC 32.8 (L) 33.0 - 37.0 g/dL    RDW 13.9 11.5 - 14.5 %    Platelets 992 132 - 956 K/uL    MPV 10.8 9.4 - 12.4 fL    Neutrophils % 88.8 (H) 50.0 - 65.0 %    Lymphocytes % 4.7 (L) 20.0 - 40.0 %    Monocytes % 6.0 0.0 - 10.0 %    Eosinophils % 0.0 0.0 - 5.0 %    Basophils % 0.1 0.0 - 1.0 %    Neutrophils Absolute 9.5 (H) 1.5 - 7.5 K/uL    Immature Granulocytes # 0.0 K/uL    Lymphocytes Absolute 0.5 (L) 1.1 - 4.5 K/uL    Monocytes Absolute 0.60 0.00 - 0.90 K/uL    Eosinophils Absolute 0.00 0.00 - 0.60 K/uL    Basophils Absolute 0.00 0.00 - 0.20 K/uL   Comprehensive Metabolic Panel w/ Reflex to MG    Collection Time: 04/14/21  3:04 PM   Result Value Ref Range    Sodium 136 136 - 145 mmol/L    Potassium reflex Magnesium 4.5 3.5 - 5.0 mmol/L    Chloride 99 98 - 111 mmol/L    CO2 26 22 - 29 mmol/L    Anion Gap 11 7 - 19 mmol/L    Glucose 88 74 - 109 mg/dL    BUN 14 6 - 20 mg/dL    CREATININE 0.8 0.5 - 1.2 mg/dL    GFR Non-African American >60 >60    GFR African American >59 >59    Calcium 8.7 8.6 - 10.0 mg/dL    Total Protein 7.3 6.6 - 8.7 g/dL    Albumin 3.8 3.5 - 5.2 g/dL    Total Bilirubin 1.6 (H) 0.2 - 1.2 mg/dL    Alkaline Phosphatase 285 (H) 40 - 130 U/L    ALT 49 (H) 5 - 41 U/L    AST 52 (H) 5 - 40 U/L   Troponin    Collection Time: 04/14/21  3:04 PM   Result Value Ref Range    Troponin 0.02 0.00 - 0.03 ng/mL   Protime-INR    Collection Time: 04/14/21  3:04 PM   Result Value Ref Range    Protime 37.7 (H) 12.0 - 14.6 sec    INR 3.67 (H) 0.88 - 1.18   APTT    Collection Time: 04/14/21  3:04 PM   Result Value Ref Range    aPTT 43.0 (H) 26.0 - 36.2 sec   Ethanol    Collection Time: 04/14/21  3:04 PM   Result Value Ref Range    Ethanol Lvl <10 mg/dL   Acetaminophen Level    Collection Time: 04/14/21  3:04 PM   Result Value Ref Range    Acetaminophen Level <92 ug/mL   Salicylate    Collection Time: 04/14/21  3:04 PM   Result Value Ref Range    Salicylate, Serum <4.1 3.0 - 10.0 mg/dL   TSH WITH REFLEX TO FT4    Collection Time: 04/14/21  3:04 PM   Result Value Ref Range    TSH Reflex FT4 1.00 0.35 - 5.50 uIU/mL   Vitamin B12    Collection Time: 04/14/21  3:04 PM   Result Value Ref Range    Vitamin B-12 612 211 - 946 pg/mL   Vitamin D 25 Hydroxy    Collection Time: 04/14/21  3:04 PM   Result Value Ref Range    Vit D, 25-Hydroxy 26.4 (L) >=30 ng/mL   COVID-19, Rapid    Collection Time: 04/14/21  3:13 PM    Specimen: Nasopharyngeal Swab   Result Value Ref Range    SARS-CoV-2, NAAT Not Detected Not Detected   Urinalysis Reflex to Culture    Collection Time: 04/14/21  3:23 PM    Specimen: Urine, clean catch   Result Value Ref Range    Color, UA YELLOW Straw/Yellow    Clarity, UA Clear Clear    Glucose, Ur Negative Negative mg/dL    Bilirubin Urine Negative Negative    Ketones, Urine TRACE (A) Negative mg/dL    Specific Gravity, UA 1.014 1.005 - 1.030    Blood, Urine MODERATE (A) Negative    pH, UA 7.0 5.0 - 8.0    Protein,  Negative mg/dL    Urobilinogen, Urine 1.0 <2.0 E.U./dL    Nitrite, Urine Negative Negative    Leukocyte Esterase, Urine Negative Negative   Urine Drug Screen    Collection Time: 04/14/21  3:23 PM   Result Value Ref Range    Amphetamine Screen, Urine Negative Negative <1000 ng/mL    Barbiturate Screen, Ur Negative Negative < 200 ng/mL    Benzodiazepine Screen, Urine Negative Negative <100 ng/mL    Cannabinoid Scrn, Ur Negative Negative <50 ng/mL    Cocaine Metabolite Screen, Urine Negative Negative <300 ng/mL    Opiate Scrn, Ur Negative Negative < 300 ng/mL    Drug Screen Comment: see below    Microscopic Urinalysis    Collection Time: 04/14/21  3:23 PM   Result Value Ref Range    Bacteria, UA NEGATIVE (A) None Seen /HPF    Crystals, UA NEG (A) None Seen /HPF    Hyaline Casts, UA 1 0 - 8 /HPF    WBC, UA 1 0 - 5 /HPF    RBC, UA 25 (H) 0 - 4 /HPF    Epithelial Cells, UA 2 0 - 5 /HPF   POCT Glucose    Collection Time: 04/14/21  4:30 PM   Result Value Ref Range    POC Glucose 87 70 - 99 mg/dl    Performed on AccuChek    POCT Glucose    Collection Time: 04/14/21  8:34 PM   Result Value Ref Range    POC Glucose 53 (L) 70 - 99 mg/dl    Performed on AccuChek    POCT Glucose    Collection Time: 04/14/21  8:55 PM   Result Value Ref Range    POC Glucose 89 70 - 99 mg/dl    Performed on AccuChek    POCT Glucose    Collection Time: 04/15/21 12:47 AM   Result Value Ref Range    POC Glucose 35 (LL) 70 - 99 mg/dl    Performed on AccuChek    POCT Glucose    Collection Time: 04/15/21  1:10 AM   Result Value Ref Range    POC Glucose 65 (L) 70 - 99 mg/dl    Performed on AccuChek    POCT Glucose    Collection Time: 04/15/21  1:28 AM   Result Value Ref Range    POC Glucose 57 (L) 70 - 99 mg/dl    Performed on AccuChek    POCT Glucose    Collection Time: 04/15/21  1:47 AM   Result Value Ref Range    POC Glucose 131 (H) 70 - 99 mg/dl    Performed on AccuChek    POCT Glucose    Collection Time: 04/15/21  2:13 AM   Result Value Ref Range    POC Glucose 126 (H) 70 - 99 mg/dl    Performed on AccuChek    POCT Glucose    Collection Time: 04/15/21  3:04 AM   Result Value Ref Range    POC Glucose 110 (H) 70 - 99 mg/dl    Performed on AccuChek    Comprehensive Metabolic Panel    Collection Time: 04/15/21  4:51 AM   Result Value Ref Range    Sodium 136 136 - 145 mmol/L    Potassium 4.2 3.5 - 5.0 mmol/L    Chloride 100 98 - 111 mmol/L    CO2 27 22 - 29 mmol/L    Anion Gap 9 7 - 19 mmol/L    Glucose 85 74 - 109 mg/dL    BUN 18 6 - 20 mg/dL    CREATININE 1.0 0.5 - 1.2 mg/dL    GFR Non-African American >60 >60    GFR African American >59 >59    Calcium 8.8 8.6 - 10.0 mg/dL    Total Protein 6.3 (L) 6.6 - 8.7 g/dL    Albumin 3.6 3.5 - 5.2 g/dL    Total Bilirubin 1.2 0.2 - 1.2 mg/dL    Alkaline Phosphatase 267 (H) 40 - 130 U/L    ALT 44 (H) 5 - 41 U/L    AST 47 (H) 5 - 40 U/L   CBC auto differential    Collection Time: 04/15/21  4:51 AM   Result Value Ref Range    WBC 8.7 4.8 - 10.8 K/uL    RBC 4.16 (L) 4.70 - 6.10 M/uL    Hemoglobin 13.2 (L) 14.0 - 18.0 g/dL    Hematocrit 40.9 (L) 42.0 - 52.0 %    MCV 98.3 (H) 80.0 - 94.0 fL    MCH 31.7 (H) 27.0 - 31.0 pg    MCHC 32.3 (L) 33.0 - 37.0 g/dL    RDW 14.2 11.5 - 14.5 %    Platelets 753 953 - 260 K/uL    MPV 10.8 9.4 - 12.4 fL    Neutrophils % 72.2 (H) 50.0 - 65.0 %    Lymphocytes % 15.2 (L) 20.0 - 40.0 %    Monocytes % 11.5 (H) 0.0 - 10.0 %    Eosinophils % 0.2 0.0 - 5.0 %    Basophils % 0.6 0.0 - 1.0 %    Neutrophils Absolute 6.3 1.5 - 7.5 K/uL    Immature Granulocytes # 0.0 K/uL    Lymphocytes Absolute 1.3 1.1 - 4.5 K/uL    Monocytes Absolute 1.00 (H) 0.00 - 0.90 K/uL    Eosinophils Absolute 0.00 0.00 - 0.60 K/uL    Basophils Absolute 0.10 0.00 - 0.20 K/uL   Magnesium    Collection Time: 04/15/21  4:51 AM   Result Value Ref Range    Magnesium 2.2 1.6 - 2.6 mg/dL   Phosphorus    Collection Time: 04/15/21 demonstrated. Signed by Dr April Unger on 4/14/2021 7:45 PM       Diet:  DIET DYSPHAGIA SOFT AND BITE-SIZED; Carb Control: 3 carb choices (45 gms)/meal; Moderately Thick (Honey)    Examination:  Vitals: BP (!) 155/86   Pulse 98   Temp 98.7 °F (37.1 °C) (Temporal)   Resp 18   Ht 6' (1.829 m)   Wt 278 lb (126.1 kg)   SpO2 90%   BMI 37.70 kg/m²  No intake or output data in the 24 hours ending 04/15/21 0918  General appearance: He is awake, alert, giving a thumbs up with right hand, telegraphic speech, appears anxious  Skin: Skin color, texture, turgor normal. No rashes or lesions  HEENT: Head: Normal, normocephalic, atraumatic. Neck:no adenopathy, no carotid bruit, no JVD, supple, symmetrical, trachea midline and thyroid not enlarged, symmetric, no tenderness/mass/nodules  Lungs: clear to auscultation bilaterally  Heart:  Irregularly irregular rate and rhythm  Abdomen: soft, non-tender; bowel sounds normal; no masses,  no organomegaly  Extremities: moderate peripheral edema, chronic venous stasis dermatitis. Neurologic:  Alert. No dysarthria. Speech is nonfluent. He is able to answer questions and follow commands  EOMI, PERRL, VFF. No facial weakness. Limb strength is generally weak but no focal weakness or gross ataxia. No pronator drift. Assessment:   1. Encephalopathy, no evidence for stroke. Odd symptoms of telegraphic speech and left sided weakness. ? post ictal.  Improving. 2.         Hypertension  3. Hyperlipidemia  4. Diabetes  5. Chronic atrial fibrillation  Discussed with his mother    Plan:   1. PT/OT/ST  2. Continue coumadin, statin  3. Echo/telemetry  4. EEG     Discharge planning: To be determined    Reviewed treatment plans with the patient and/or family. 35 minutes spent in face to face interaction and coordination of care.      Electronically signed by Azael Melo MD on 4/15/2021 at 9:18 AM

## 2021-05-10 ENCOUNTER — ALLIED HEALTH/NURSE VISIT (OUTPATIENT)
Dept: NURSING | Facility: CLINIC | Age: 26
End: 2021-05-10
Payer: COMMERCIAL

## 2021-05-10 DIAGNOSIS — Z11.1 SCREENING EXAMINATION FOR PULMONARY TUBERCULOSIS: Primary | ICD-10-CM

## 2021-05-10 PROCEDURE — 99207 PR NO CHARGE NURSE ONLY: CPT

## 2021-05-10 PROCEDURE — 86580 TB INTRADERMAL TEST: CPT

## 2021-05-10 NOTE — NURSING NOTE

## 2021-05-12 ENCOUNTER — ALLIED HEALTH/NURSE VISIT (OUTPATIENT)
Dept: NURSING | Facility: CLINIC | Age: 26
End: 2021-05-12
Payer: COMMERCIAL

## 2021-05-12 DIAGNOSIS — Z11.1 SCREENING EXAMINATION FOR PULMONARY TUBERCULOSIS: Primary | ICD-10-CM

## 2021-05-12 LAB
PPDINDURATION: 0 MM (ref 0–5)
PPDREDNESS: 0 MM

## 2021-05-12 PROCEDURE — 99207 PR NO CHARGE NURSE ONLY: CPT

## 2021-06-11 ENCOUNTER — OFFICE VISIT (OUTPATIENT)
Dept: FAMILY MEDICINE | Facility: CLINIC | Age: 26
End: 2021-06-11
Payer: COMMERCIAL

## 2021-06-11 VITALS
HEART RATE: 76 BPM | SYSTOLIC BLOOD PRESSURE: 120 MMHG | WEIGHT: 263 LBS | BODY MASS INDEX: 43.77 KG/M2 | TEMPERATURE: 98.1 F | OXYGEN SATURATION: 96 % | DIASTOLIC BLOOD PRESSURE: 62 MMHG

## 2021-06-11 DIAGNOSIS — N89.8 VAGINAL DISCHARGE: Primary | ICD-10-CM

## 2021-06-11 DIAGNOSIS — N76.0 BACTERIAL VAGINOSIS: ICD-10-CM

## 2021-06-11 DIAGNOSIS — B96.89 BACTERIAL VAGINOSIS: ICD-10-CM

## 2021-06-11 DIAGNOSIS — R10.30 LOWER ABDOMINAL PAIN: ICD-10-CM

## 2021-06-11 LAB
SPECIMEN SOURCE: ABNORMAL
WET PREP SPEC: ABNORMAL

## 2021-06-11 PROCEDURE — 87210 SMEAR WET MOUNT SALINE/INK: CPT | Performed by: FAMILY MEDICINE

## 2021-06-11 PROCEDURE — 87491 CHLMYD TRACH DNA AMP PROBE: CPT | Performed by: FAMILY MEDICINE

## 2021-06-11 PROCEDURE — 99213 OFFICE O/P EST LOW 20 MIN: CPT | Performed by: FAMILY MEDICINE

## 2021-06-11 PROCEDURE — 87591 N.GONORRHOEAE DNA AMP PROB: CPT | Performed by: FAMILY MEDICINE

## 2021-06-11 RX ORDER — METRONIDAZOLE 500 MG/1
500 TABLET ORAL 2 TIMES DAILY
Qty: 14 TABLET | Refills: 0 | Status: SHIPPED | OUTPATIENT
Start: 2021-06-11 | End: 2021-06-18

## 2021-06-11 NOTE — PROGRESS NOTES
"    Assessment & Plan     Vaginal discharge   Differentials: BV, chlamydia/GC, yeast infection.  - Wet prep  - NEISSERIA GONORRHOEA PCR  - CHLAMYDIA TRACHOMATIS PCR    Bacterial vaginosis   Wet prep shows clue cells; consistent with bacterial vaginosis.  - metroNIDAZOLE (FLAGYL) 500 MG tablet; Take 1 tablet (500 mg) by mouth 2 times daily for 7 days    Lower abdominal pain; episodic    -  Possibly BV related    BMI:   Estimated body mass index is 43.77 kg/m  as calculated from the following:    Height as of 4/8/21: 1.651 m (5' 5\").    Weight as of this encounter: 119.3 kg (263 lb).   Weight management plan: Discussed healthy diet and exercise guidelines       Return in about 1 week (around 6/18/2021) for Follow up for symptoms recheck.    Les Guzmán MD  [facility] SAIGE Garrett is a 25 year old who presents for the following health issues:    HPI     Chief Complaint   Patient presents with     Pelvic Pain     and discharge x 3 weeks. Hx of yeast infection     Been doing monostat  Has a whitish discharge, no odor or itching  Had yeast infections before.  Has random lower abdominal pain and last a short time, about 3 episodes.  Bladder function is normal  Sexually with one partner for past 4 yrs  LMP: 5/26/2021    Review of Systems   Constitutional, HEENT, cardiovascular, pulmonary, gi and gu systems are negative, except as otherwise noted.      Objective    /62   Pulse 76   Temp 98.1  F (36.7  C) (Oral)   Wt 119.3 kg (263 lb)   SpO2 96%   BMI 43.77 kg/m    Body mass index is 43.77 kg/m .  Physical Exam   GENERAL: healthy, alert and no distress  RESP: lungs clear to auscultation - no rales, rhonchi or wheezes  CV: regular rate and rhythm, no murmur, click or rub  MS: no gross musculoskeletal defects noted, no edema  PSYCH: mentation appears normal, affect normal/bright  Results for orders placed or performed in visit on 06/11/21   Wet prep     Status: Abnormal    " Specimen: Vagina   Result Value Ref Range    Specimen Description Vagina     Wet Prep No Trichomonas seen     Wet Prep No yeast seen     Wet Prep Clue cells seen (A)     Wet Prep Rare     Wet Prep WBC'S seen     Wet Prep Few    NEISSERIA GONORRHOEA PCR     Status: None    Specimen: Vagina   Result Value Ref Range    Specimen Descrip Vagina     N Gonorrhea PCR Negative NEG^Negative   CHLAMYDIA TRACHOMATIS PCR     Status: None    Specimen: Vagina   Result Value Ref Range    Specimen Description Vagina     Chlamydia Trachomatis PCR Negative NEG^Negative

## 2021-06-12 LAB
C TRACH DNA SPEC QL NAA+PROBE: NEGATIVE
N GONORRHOEA DNA SPEC QL NAA+PROBE: NEGATIVE
SPECIMEN SOURCE: NORMAL
SPECIMEN SOURCE: NORMAL

## 2021-06-26 ENCOUNTER — NURSE TRIAGE (OUTPATIENT)
Dept: NURSING | Facility: CLINIC | Age: 26
End: 2021-06-26

## 2021-06-26 NOTE — TELEPHONE ENCOUNTER
"1-2 weeks ago - took pregnancy test and it was positive. Hasn't set up prenatal appointment yet. Today is having light bleeding with clots - clots are shameka big - sits on the tip of her finger (pea sized)    Per protocol advised evaluation in 24 hours - will go to  per protocol recommendations.     Jennifer Lundy RN on 6/26/2021 at 12:14 PM    Reason for Disposition    [1] Intermittent lower abdominal pain (e.g., cramping) AND [2] present > 24 hours    Additional Information    Negative: Shock suspected (e.g., cold/pale/clammy skin, too weak to stand, low BP, rapid pulse)    Negative: Difficult to awaken or acting confused (e.g., disoriented, slurred speech)    Negative: Passed out (i.e., lost consciousness, collapsed and was not responding)    Negative: Sounds like a life-threatening emergency to the triager    Negative: [1] Vaginal bleeding AND [2] pregnant > 20 weeks    Negative: Not pregnant or pregnancy status unknown    Negative: SEVERE abdominal pain    Negative: [1] SEVERE vaginal bleeding (e.g., soaking 2 pads / hour, large blood clots) AND [2] present 2 or more hours    Negative: SEVERE dizziness (e.g., unable to stand, requires support to walk, feels like passing out)    Negative: [1] MODERATE vaginal bleeding (i.e., soaking 1 pad / hour; clots) AND [2] present > 6 hours    Negative: [1] MODERATE vaginal bleeding (i.e., soaking 1 pad / hour; clots) AND [2] pregnant > 12 weeks    Negative: Passed tissue (e.g., gray-white)    Negative: Shoulder pain    Negative: Pale skin (pallor) of new onset or worsening    Negative: Patient sounds very sick or weak to the triager    Negative: [1] Constant abdominal pain AND [2] present > 2 hours    Negative: Fever > 100.4 F (38.0 C)    Answer Assessment - Initial Assessment Questions  1. ONSET: \"When did this bleeding start?\"        yesterday  2. DESCRIPTION: \"Describe the bleeding that you are having.\" \"How much bleeding is there?\"     - SPOTTING: spotting, or pinkish / " "brownish mucous discharge; does not fill panti-liner or pad     - MILD:  less than 1 pad / hour; less than patient's usual menstrual bleeding    - MODERATE: 1-2 pads / hour; small-medium blood clots (e.g., pea, grape, small coin)     - SEVERE: soaking 2 or more pads/hour for 2 or more hours; bleeding not contained by pads or continuous red blood from vagina; large blood clots (e.g., golf ball, large coin)       Spotting started yesterday - is getting a little heavier - might need to put on a panty liner - now clots are approx pea sized  3. ABDOMINAL PAIN SEVERITY: If present, ask: \"How bad is it?\"  (e.g., Scale 1-10; mild, moderate, or severe)    - MILD (1-3): doesn't interfere with normal activities, abdomen soft and not tender to touch     - MODERATE (4-7): interferes with normal activities or awakens from sleep, tender to touch     - SEVERE (8-10): excruciating pain, doubled over, unable to do any normal activities      Mild - more back pain than abdomen  4. PREGNANCY: \"Do you know how many weeks or months pregnant you are?\" \"When was the first day of your last normal menstrual period?\"      About 5 weeks  5. HEMODYNAMIC STATUS: \"Are you weak or feeling lightheaded?\" If so, ask: \"Can you stand and walk normally?\"       Dizzy at times  6. OTHER SYMPTOMS: \"What other symptoms are you having with the bleeding?\" (e.g., passed tissue, vaginal discharge, fever, menstrual-type cramps)      Clots have little white stuff in them    Protocols used: PREGNANCY - VAGINAL BLEEDING LESS THAN 20 WEEKS EGA-A-AH      "

## 2021-06-29 PROBLEM — R87.612 PAPANICOLAOU SMEAR OF CERVIX WITH LOW GRADE SQUAMOUS INTRAEPITHELIAL LESION (LGSIL): Status: ACTIVE | Noted: 2020-07-21

## 2021-07-18 ENCOUNTER — NURSE TRIAGE (OUTPATIENT)
Dept: NURSING | Facility: CLINIC | Age: 26
End: 2021-07-18

## 2021-07-18 NOTE — TELEPHONE ENCOUNTER
Patient says she is having trouble moving her legs; says can wiggle her toes but says there's decreased feelings in her legs and have not been out of bed for the past 10 hours.    Disposition: Call 911  Advised needs to be evaluated in ED as soon as possible.    Reviewed care advice with caller per RN triage protocol guideline.  Caller verbalized understanding.          Reason for Disposition    [1] SEVERE weakness (i.e., unable to walk or barely able to walk, requires support) AND [2] new onset or worsening    Protocols used: NEUROLOGIC DEFICIT-A-AH

## 2021-07-20 ENCOUNTER — OFFICE VISIT (OUTPATIENT)
Dept: OBGYN | Facility: CLINIC | Age: 26
End: 2021-07-20
Payer: COMMERCIAL

## 2021-07-20 VITALS
WEIGHT: 258.2 LBS | DIASTOLIC BLOOD PRESSURE: 81 MMHG | HEART RATE: 69 BPM | BODY MASS INDEX: 39.13 KG/M2 | SYSTOLIC BLOOD PRESSURE: 121 MMHG | HEIGHT: 68 IN

## 2021-07-20 DIAGNOSIS — F32.5 MAJOR DEPRESSIVE DISORDER WITH SINGLE EPISODE, IN FULL REMISSION (H): ICD-10-CM

## 2021-07-20 DIAGNOSIS — Z01.419 ENCOUNTER FOR GYNECOLOGICAL EXAMINATION WITHOUT ABNORMAL FINDING: Primary | ICD-10-CM

## 2021-07-20 DIAGNOSIS — R87.612 PAPANICOLAOU SMEAR OF CERVIX WITH LOW GRADE SQUAMOUS INTRAEPITHELIAL LESION (LGSIL): ICD-10-CM

## 2021-07-20 DIAGNOSIS — D25.9: ICD-10-CM

## 2021-07-20 PROBLEM — R10.9 ABDOMINAL PAIN: Status: RESOLVED | Noted: 2019-07-29 | Resolved: 2021-07-20

## 2021-07-20 PROCEDURE — 99385 PREV VISIT NEW AGE 18-39: CPT | Performed by: OBSTETRICS & GYNECOLOGY

## 2021-07-20 PROCEDURE — 87591 N.GONORRHOEAE DNA AMP PROB: CPT | Performed by: OBSTETRICS & GYNECOLOGY

## 2021-07-20 PROCEDURE — 88175 CYTOPATH C/V AUTO FLUID REDO: CPT | Performed by: OBSTETRICS & GYNECOLOGY

## 2021-07-20 PROCEDURE — 87491 CHLMYD TRACH DNA AMP PROBE: CPT | Performed by: OBSTETRICS & GYNECOLOGY

## 2021-07-20 RX ORDER — CIPROFLOXACIN 500 MG/1
TABLET, FILM COATED ORAL
COMMUNITY
Start: 2021-07-19 | End: 2022-09-07

## 2021-07-20 ASSESSMENT — MIFFLIN-ST. JEOR: SCORE: 1966.44

## 2021-07-20 NOTE — PATIENT INSTRUCTIONS
If you have any questions regarding your visit, Please contact your care team.     [facility]: [phone]  Women s Health CLINIC HOURS TELEPHONE NUMBER       Artem Richardson M.D.    Sherry-KEISHA Summers-KEISHA Tse-Medical Assistant    [place]oro-  Lissy-     Monday-[place]  8:00a.m-4:45 p.m  Tuesday-[place]  9:00a.m-4:00 p.m  Wednesday-[place] 8:00a.m-4:45 p.m.  Thursday-[place]  8:00a.m-4:45 p.m.  Friday-[place]  8:00a.m-4:45 p.m. [facility]  [address] [place] [id number] [phone] ask for [facility]  [phone] Fax  Imaging [phone]    [facility]  [address]  [place], [place] [address] [phone]    [facility]  [address] [place]  [place] [address] [phone] ask [facility]  [phone] Fax  Imaging [phone]     Urgent Care locations:    [place]        [place] Monday-Friday  5 pm - 9 pm  Saturday and Sunday   9 am - 5 pm  Monday-Friday   11 am - 9 pm  Saturday and Sunday   9 am - 5 pm   [phone]   If you need a medication refill, please contact your pharmacy. Please allow 3 business days for your refill to be completed.  As always, Thank you for trusting us with your healthcare needs!

## 2021-07-20 NOTE — PROGRESS NOTES
"Tami Almaraz is a 25 year old year old who presents for annual exam. Patient's last menstrual period was 06/28/2021 (exact date).    HPI: Doing fair.  Oligomenorrhea for about six months earlier this year but now menses q 28-30 days x 3-6 days. LMP 6/20.  5/26.  Significant interval changes: ED visit for E coli bladder infection two days ago and feeling better with treatment. UPT neg then but should recheck this in 1 week if menses still delayed.   Current significant symptoms: none.  Other problems or concerns: small fundal serosal fibroid discovered last year on ultrasound.    LSIL Pap last yr and needs follow-up.   Contraceptive method is condoms usually.    Past medical, obstetrical, surgical, family and social history reviewed and as noted or updated in chart.     Allergies, meds and supplements are as noted or updated in chart.     ROS:     Systems reviewed include constitutional; breast;                 cardiac; respiratory; gastrointestinal; genitourinary;                                musculoskeletal; integumentary; psychological;                                hematologic/lymphatic and endocrine.                  These systems were negative for significant symptoms except                 for the following additional: none ; see HPI.                                Exam:  VS as noted./81   Pulse 69   Ht 1.73 m (5' 8.11\")   Wt 117.1 kg (258 lb 3.2 oz)   LMP 06/28/2021 (Exact Date)   Breastfeeding No   BMI 39.13 kg/m                      Neck, nodes, breasts, abd and pelvis are                             normal or negative except for, or in particular noting, the following                pertinent findings: non-specific circular depigmented 2 cm area at left breast that developed postpartum- observe.       Assessment: Gyn exam. Problem List updated.    Plan and Recommendations: Symptoms, problems and concerns reviewed. Health habits and vaccinations reviewed. Calcium/Vitamin D and " multi-vitamin supplements instructions given. Breast/skin self-exam awareness. Screening tests including limitations discussed. Follow-up visits discussed. See orders. Return to clinic 1 year.  Encouraged weight loss.       Tami was seen today for physical.    Diagnoses and all orders for this visit:    Encounter for gynecological examination without abnormal finding  -     Pap imaged thin layer diagnostic only  -     Chlamydia trachomatis PCR; Future  -     Neisseria gonorrhoeae PCR; Future  -     Neisseria gonorrhoeae PCR  -     Chlamydia trachomatis PCR    Papanicolaou smear of cervix with low grade squamous intraepithelial lesion (LGSIL)  -     Pap imaged thin layer diagnostic only    Serosal uterine leiomyoma    Major depressive disorder with single episode, in full remission (H)        Artem Richardson MD

## 2021-07-22 LAB
C TRACH DNA SPEC QL NAA+PROBE: NEGATIVE
N GONORRHOEA DNA SPEC QL NAA+PROBE: NEGATIVE

## 2021-07-23 LAB
BKR LAB AP GYN ADEQUACY: NORMAL
BKR LAB AP GYN INTERPRETATION: NORMAL
BKR LAB AP HPV REFLEX: NO
BKR LAB AP LMP: NORMAL
BKR LAB AP PREVIOUS ABNL DX: NORMAL
BKR LAB AP PREVIOUS ABNORMAL: NORMAL
PATH REPORT.COMMENTS IMP SPEC: NORMAL
PATH REPORT.RELEVANT HX SPEC: NORMAL

## 2021-07-29 ENCOUNTER — PATIENT OUTREACH (OUTPATIENT)
Dept: OBGYN | Facility: CLINIC | Age: 26
End: 2021-07-29

## 2021-10-10 ENCOUNTER — HEALTH MAINTENANCE LETTER (OUTPATIENT)
Age: 26
End: 2021-10-10

## 2021-12-22 ENCOUNTER — OFFICE VISIT (OUTPATIENT)
Dept: URGENT CARE | Facility: URGENT CARE | Age: 26
End: 2021-12-22
Payer: COMMERCIAL

## 2021-12-22 ENCOUNTER — ANCILLARY PROCEDURE (OUTPATIENT)
Dept: GENERAL RADIOLOGY | Facility: CLINIC | Age: 26
End: 2021-12-22
Attending: PHYSICIAN ASSISTANT
Payer: COMMERCIAL

## 2021-12-22 VITALS
WEIGHT: 269.4 LBS | BODY MASS INDEX: 40.83 KG/M2 | TEMPERATURE: 97.4 F | DIASTOLIC BLOOD PRESSURE: 76 MMHG | SYSTOLIC BLOOD PRESSURE: 119 MMHG | HEART RATE: 62 BPM | OXYGEN SATURATION: 97 %

## 2021-12-22 DIAGNOSIS — S69.91XA INJURY OF RIGHT WRIST, INITIAL ENCOUNTER: ICD-10-CM

## 2021-12-22 DIAGNOSIS — S63.501A SPRAIN OF RIGHT WRIST, INITIAL ENCOUNTER: Primary | ICD-10-CM

## 2021-12-22 PROCEDURE — 73110 X-RAY EXAM OF WRIST: CPT | Mod: RT | Performed by: RADIOLOGY

## 2021-12-22 PROCEDURE — 99213 OFFICE O/P EST LOW 20 MIN: CPT | Performed by: PHYSICIAN ASSISTANT

## 2021-12-22 ASSESSMENT — ENCOUNTER SYMPTOMS
CARDIOVASCULAR NEGATIVE: 1
NECK PAIN: 0
HEADACHES: 0
ENDOCRINE NEGATIVE: 1
VOMITING: 0
EYES NEGATIVE: 1
FEVER: 0
SORE THROAT: 0
LIGHT-HEADEDNESS: 0
BACK PAIN: 0
SHORTNESS OF BREATH: 0
PALPITATIONS: 0
ARTHRALGIAS: 1
JOINT SWELLING: 0
COUGH: 0
RESPIRATORY NEGATIVE: 1
WOUND: 0
MYALGIAS: 0
DIZZINESS: 0
ALLERGIC/IMMUNOLOGIC NEGATIVE: 1
NECK STIFFNESS: 0
RHINORRHEA: 0
DIARRHEA: 0
NAUSEA: 0
CHILLS: 0
WEAKNESS: 0

## 2021-12-22 NOTE — PROGRESS NOTES
Chief Complaint:    Chief Complaint   Patient presents with     Musculoskeletal Problem     pain in right wrist- noticed 2 days ago- possibly happened when her daughter was falling and she tried to grab her         Medical Decision Making:    Vital signs reviewed by Gigi Ojeda PA-C  /76 (BP Location: Left arm, Patient Position: Sitting, Cuff Size: Adult Large)   Pulse 62   Temp 97.4  F (36.3  C) (Tympanic)   Wt 122.2 kg (269 lb 6.4 oz)   LMP 08/22/2021 (Approximate)   SpO2 97%   Breastfeeding No   BMI 40.83 kg/m      Differential Diagnosis:  MS Injury Pain: sprain, fracture, tendonitis, muscle strain, contusion and dislocation      ASSESSMENT:     1. Sprain of right wrist, initial encounter    2. Injury of right wrist, initial encounter           PLAN:     XR of the R wrist was negative for any acute fracture per my read.  RICE discussed.  Ibuprofen and or Tylenol for pain.  Patient given wrist brace for support and comfort.  Patient given letter for work.  Patient instructed to follow up with PCP in 1 week if symptoms are not improving.  Sooner if symptoms worsen.  Worrisome symptoms discussed with instructions to go to the ED.  Patient verbalized understanding and agreed with this plan.    Labs:     Results for orders placed or performed in visit on 12/22/21   XR Wrist Right G/E 3 Views     Status: None    Narrative    EXAM: XR WRIST RIGHT G/E 3 VIEWS  LOCATION: [facility]  DATE/TIME: 12/22/2021 5:15 PM    INDICATION: R wrist pain for 2 days general in nature  COMPARISON: None.      Impression    IMPRESSION: Normal joint spaces and alignment. No fracture.       Current Meds:    Current Outpatient Medications:      ciprofloxacin (CIPRO) 500 MG tablet, , Disp: , Rfl:     Allergies:  Allergies   Allergen Reactions     Compazine [Prochlorperazine]      Morphine      Reglan [Metoclopramide]      Toradol [Ketorolac]      Vicodin [Hydrocodone-Acetaminophen]      Only allergy  to hydrocodone, able to take tylenol     Zyprexa [Olanzapine]        SUBJECTIVE    HPI: Tami Almaraz is an 25 year old female who presents for evaluation and treatment of R wrist injury.    She denies any numbness, tingling, or weakness of the R wrist.  No Hx of injury or surgery.  Symptoms started 2 days ago when she tried to grab her daughter when she was falling.      ROS:      Review of Systems   Constitutional: Negative for chills and fever.   HENT: Negative for congestion, ear pain, rhinorrhea and sore throat.    Eyes: Negative.    Respiratory: Negative.  Negative for cough and shortness of breath.    Cardiovascular: Negative.  Negative for chest pain and palpitations.   Gastrointestinal: Negative for diarrhea, nausea and vomiting.   Endocrine: Negative.    Genitourinary: Negative.    Musculoskeletal: Positive for arthralgias. Negative for back pain, joint swelling, myalgias, neck pain and neck stiffness.   Skin: Negative.  Negative for rash and wound.   Allergic/Immunologic: Negative.  Negative for immunocompromised state.   Neurological: Negative for dizziness, weakness, light-headedness and headaches.        Family History   Family History   Problem Relation Age of Onset     Crohn's Disease Mother      Hypertension Father      Colitis Sister      Pancreatitis Sister        Social History  Social History     Socioeconomic History     Marital status: Single     Spouse name: partner- Jeffy     Number of children: 1     Years of education: Not on file     Highest education level: Not on file   Occupational History     Occupation: unemployed   Tobacco Use     Smoking status: Former Smoker     Types: Cigars     Smokeless tobacco: Never Used   Vaping Use     Vaping Use: Never used   Substance and Sexual Activity     Alcohol use: Not Currently     Drug use: Never     Sexual activity: Yes     Partners: Male     Birth control/protection: Condom   Other Topics Concern     Not on file   Social History Narrative      Not on file     Social Determinants of Health     Financial Resource Strain: Not on file   Food Insecurity: Not on file   Transportation Needs: Not on file   Physical Activity: Not on file   Stress: Not on file   Social Connections: Not on file   Intimate Partner Violence: Not on file   Housing Stability: Not on file        Surgical History:  Past Surgical History:   Procedure Laterality Date     NO HISTORY OF SURGERY          Problem List:  Patient Active Problem List   Diagnosis     Morbid obesity (H)     Papanicolaou smear of cervix with low grade squamous intraepithelial lesion (LGSIL)     Serosal uterine leiomyoma     Major depressive disorder with single episode, in full remission (H)           OBJECTIVE:     Vital signs noted and reviewed by Gigi Ojeda PA-C  /76 (BP Location: Left arm, Patient Position: Sitting, Cuff Size: Adult Large)   Pulse 62   Temp 97.4  F (36.3  C) (Tympanic)   Wt 122.2 kg (269 lb 6.4 oz)   LMP 08/22/2021 (Approximate)   SpO2 97%   Breastfeeding No   BMI 40.83 kg/m       PEFR:    Physical Exam  Vitals and nursing note reviewed.   Constitutional:       General: She is not in acute distress.     Appearance: She is well-developed. She is not ill-appearing, toxic-appearing or diaphoretic.   HENT:      Head: Normocephalic and atraumatic.      Right Ear: Tympanic membrane and external ear normal. No drainage, swelling or tenderness. Tympanic membrane is not perforated, erythematous, retracted or bulging.      Left Ear: Tympanic membrane and external ear normal. No drainage, swelling or tenderness. Tympanic membrane is not perforated, erythematous, retracted or bulging.      Nose: No mucosal edema, congestion or rhinorrhea.      Right Sinus: No maxillary sinus tenderness or frontal sinus tenderness.      Left Sinus: No maxillary sinus tenderness or frontal sinus tenderness.      Mouth/Throat:      Pharynx: No pharyngeal swelling, oropharyngeal exudate, posterior  oropharyngeal erythema or uvula swelling.      Tonsils: No tonsillar abscesses.   Eyes:      Pupils: Pupils are equal, round, and reactive to light.   Neck:      Trachea: Trachea normal.   Cardiovascular:      Rate and Rhythm: Normal rate and regular rhythm.      Heart sounds: Normal heart sounds, S1 normal and S2 normal. No murmur heard.  No friction rub. No gallop.    Pulmonary:      Effort: Pulmonary effort is normal. No respiratory distress.      Breath sounds: Normal breath sounds. No decreased breath sounds, wheezing, rhonchi or rales.   Abdominal:      General: Bowel sounds are normal. There is no distension.      Palpations: Abdomen is soft. Abdomen is not rigid. There is no mass.      Tenderness: There is no abdominal tenderness. There is no guarding or rebound.   Musculoskeletal:      Right wrist: Tenderness and bony tenderness present. No swelling, deformity, effusion, snuff box tenderness or crepitus. Normal range of motion. Normal pulse.      Cervical back: Full passive range of motion without pain, normal range of motion and neck supple.   Lymphadenopathy:      Cervical: No cervical adenopathy.   Skin:     General: Skin is warm and dry.   Neurological:      Mental Status: She is alert and oriented to person, place, and time.      Cranial Nerves: No cranial nerve deficit.      Deep Tendon Reflexes: Reflexes are normal and symmetric.   Psychiatric:         Behavior: Behavior normal. Behavior is cooperative.         Thought Content: Thought content normal.         Judgment: Judgment normal.             Gigi Ojeda PA-C  12/22/2021, 5:06 PM

## 2021-12-22 NOTE — PATIENT INSTRUCTIONS
Patient Education     Wrist Sprain  A sprain is an injury to the ligaments or capsule that holds a joint together. There are no broken bones. Most sprains take about 3 to 6 weeks to heal. If it a severe sprain where the ligament is completely torn, it can take months to recover.  Most wrist sprains are treated with a splint, wrist brace, or elastic wrap for support. Severe sprains may require surgery.  Home care    Keep your arm elevated to reduce pain and swelling. This is very important during the first 48 hours.    Apply an ice pack over the injured area for 15 to 20 minutes every 3 to 6 hours. You should do this for the first 24 to 48 hours. You can make an ice pack by filling a plastic bag that seals at the top with ice cubes and then wrapping it with a thin towel. Continue to use ice packs for relief of pain and swelling as needed. As the ice melts, be careful to avoid getting your wrap, splint, or cast wet. After 48 hours, apply heat (warm shower or warm bath) for 15 to 20 minutes several times a day, or alternate ice and heat.     You may use over-the-counter pain medicine to control pain, unless another pain medicine was prescribed. If you have chronic liver or kidney disease or ever had a stomach ulcer or gastrointestinal bleeding, talk with your doctor before using these medicines.    If you were given a splint or brace, wear it for the time advised by your doctor.  Follow-up care  Follow up with your healthcare provider, or as advised. Any X-rays you had today don t show any broken bones, breaks, or fractures. Sometimes fractures don t show up on the first X-ray. Bruises and sprains can sometimes hurt as much as a fracture. These injuries can take time to heal completely. If your symptoms don t improve or they get worse, talk with your doctor. You may need a repeat X-ray. If X-rays were taken, you will be told of any new findings that may affect your care.  When to seek medical advice  Call your  healthcare provider right away if any of these occur:    Pain or swelling increases    Fingers or hand becomes cold, blue, numb, or tingly   Lacy last reviewed this educational content on 5/1/2018 2000-2021 [employer]. All rights reserved. This information is not intended as a substitute for professional medical care. Always follow your healthcare professional's instructions.

## 2021-12-22 NOTE — LETTER
[facility]  [id number] [facility] [address]          December 22, 2021    RE:  Tami Almaraz                                                                                                                                                       [address]   [place] [address]            To whom it may concern:    Tami Almaraz is under my professional care for    Sprain of right wrist, initial encounter  Injury of right wrist, initial encounter She  may return to work her next available shift.  She will need to wear the wrist brace for the next 2-3 weeks.  If she needs further restrictions, she will need to see her primary provider.    Sincerely,        Gigi Ojeda PA-C

## 2021-12-29 ENCOUNTER — E-VISIT (OUTPATIENT)
Dept: URGENT CARE | Facility: URGENT CARE | Age: 26
End: 2021-12-29
Payer: COMMERCIAL

## 2021-12-29 DIAGNOSIS — Z20.822 SUSPECTED COVID-19 VIRUS INFECTION: Primary | ICD-10-CM

## 2021-12-29 PROCEDURE — 99421 OL DIG E/M SVC 5-10 MIN: CPT | Performed by: PHYSICIAN ASSISTANT

## 2021-12-29 NOTE — PATIENT INSTRUCTIONS
Tami,      Based on your responses, you may have coronavirus (COVID-19). This illness can cause fever, cough and trouble breathing. Many people get a mild case and get better on their own. Some people can get very sick.    Will I be tested for COVID-19?  We would like to test you for COVID-19 virus. I have placed orders for this test.     To schedule: go to your [employer] home page and scroll down to the section that says  You have an appointment that needs to be scheduled  and click the large green button that says  Schedule Now  and follow the steps to find the next available openings.    If you are unable to complete these [employer] scheduling steps, please call [phone] to schedule your testing.     Return to work/school/ guidance:  Please let your workplace manager and staffing office know when your isolation ends.       If you receive a positive COVID-19 test result, follow the guidance of the those who are giving you the results. Usually the return to work is 10 days from symptom onset or positive test date, (or in some cases 20 days if you are immunocompromised). If your symptoms started after your positive test, the 10 days should start when your symptoms started.   o If you work at [employer], you must also be cleared by Employee Occupational Health and Safety to return to work.      If you receive a negative COVID-19 test result and did not have a high risk exposure to someone with a known positive COVID-19 test, you can return to work once you're free of fever for 24 hours without fever-reducing medication and your symptoms are improving or resolved.    If you receive a negative COVID-19 test and had a high-risk exposure to someone who has tested positive for COVID-19 then you can return to work 14 days after your last contact with the positive individual. Follow quarantine guidance given by your doctor or public health officials.     Sign up for [employer] Loop:  We know it's scary to hear  that you might have COVID-19. We want to track your symptoms to make sure you're okay over the next 2 weeks. Please look for an email from [employer]--this is a free, online program that we'll use to keep in touch. To sign up, follow the link in the email you will receive. Learn more at [email or web address]    How can I take care of myself?  Over the counter medications may help with your symptoms like congestion, cough, chills, or fever.    There are not many effective prescription treatments for early COVID-19. Hydroxychloroquine, ivermectin, and azithromycin are not effective or recommended for COVID-19.    If your symptoms started in the last 10 days, you may be able to receive a treatment with monoclonal antibodies. This treatment can lower your risk of severe illness and going to the hospital. It is given through an IV or under your skin (subcutaneous) and must be given at an infusion center. You must be 12 or older, weight at least 88 pounds, and have a positive COVID-19 test.     If you would like to sign up to be considered to receive the monoclonal antibody medicine, please complete a participation form through the [facility] here: MNRAP ([email or web address]). You may also call the [facility] COVID-19 Public Hotline at 1-610.955.3673 (open Mon-Fri: 9am-7pm and Sat: 10am-6pm).     Not all people who are eligible will receive the medicine, since supply is limited. You will be contacted in the next 1 to 2 business days only if you are selected. If you do not receive a call, you have not been selected to receive the medicine. If you have any questions about this medication, please contact your primary care provider. For more information, see [email or web address]      Get lots of rest. Drink extra fluids (unless a doctor has told you not to)    Take Tylenol (acetaminophen) or ibuprofen for fever or  pain. If you have liver or kidney problems, ask your family doctor if it's okay to take Tylenol o ibuprofen    Take over the counter medications for your symptoms, as directed by your doctor. You may also talk to your pharmacist.      If you have other health problems (like cancer, heart failure, an organ transplant or severe kidney disease): Call your specialty clinic if you don't feel better in the next 2 days.    Know when to call 911. Emergency warning signs include:  o Trouble breathing or shortness of breath  o Pain or pressure in the chest that doesn't go away  o Feeling confused like you haven't felt before, or not being able to wake up  o Bluish-colored lips or face    Where can I get more information?     [employer] [facility] - About COVID-19: [email or web address]     CDC - What to Do If You're Sick:     www.cdc.gov/coronavirus/2019-ncov/about/steps-when-sick.html    CDC - Ending Home Isolation:  https://www.cdc.gov/coronavirus/2019-ncov/your-health/quarantine-isolation.html    CDC - Caring for Someone:  www.cdc.gov/coronavirus/2019-ncov/if-you-are-sick/care-for-someone.html    [facility] clinical trials (COVID-19 research studies): clinicalaffairs.[facility]-clinical-trials    Below are the COVID-19 hotlines at the [facility] of [facility] ([facility]). Interpreters are available.  o For health questions: Call 345-951-8915 or 1-948.883.3618 (7 a.m. to 7 p.m.)  o For questions about schools and childcare: Call 300-496-2630 or 1-452.708.4126 (7 a.m. to 7 p.m.)  December 29, 2021  RE:  Tami GARDUNO Rufina                                                                                                                  [address]   [place] [address]      To whom it may concern:    I evaluated Tami Almaraz on December 29, 2021. Tami Marroquins should be excused from work/school.     They should let their workplace manager and staffing office know when their quarantine ends.    We  can not give an exact date as it depends on the information below. They can calculate this on their own or work with their manager/staffing office to calculate this. (For example if they were exposed on 10/04, they would have to quarantine for 14 full days. That would be through 10/18. They could return on 10/19.)    Quarantine Guidelines:    If patient receives a positive COVID-19 test result, they should follow the guidance of those who are giving the results. Usually the return to work is 10 (or in some cases 20 days from symptom onset.) If they work at [employer], they must be cleared by Employee Occupational Health and Safety to return to work.      If patient receives a negative COVID-19 test result and did not have a high risk exposure to someone with a known positive COVID-19 test, they can return to work once they're free of fever for 24 hours without fever-reducing medication and their symptoms are improving or resolved.    If patient receives a negative COVID-19 test and if they had a high risk exposure to someone who has tested positive for COVID-19 then they can return to work 14 days after their last contact with the positive individual    Note: If there is ongoing exposure to the covid positive person, this quarantine period may be longer than 14 days. (For example, if they are continually exposed to their child, who tested positive and cannot isolate from them, then the quarantine of 7-14 days can't start until their child is no longer contagious. This is typically 10 days from onset to the child's symptoms. So the total duration may be 17-24 days in this case.)     Sincerely,  Dexter Gandhi PA-C          o

## 2021-12-30 ENCOUNTER — LAB (OUTPATIENT)
Dept: URGENT CARE | Facility: URGENT CARE | Age: 26
End: 2021-12-30
Attending: PHYSICIAN ASSISTANT
Payer: COMMERCIAL

## 2021-12-30 DIAGNOSIS — Z20.822 SUSPECTED COVID-19 VIRUS INFECTION: ICD-10-CM

## 2021-12-30 PROCEDURE — U0003 INFECTIOUS AGENT DETECTION BY NUCLEIC ACID (DNA OR RNA); SEVERE ACUTE RESPIRATORY SYNDROME CORONAVIRUS 2 (SARS-COV-2) (CORONAVIRUS DISEASE [COVID-19]), AMPLIFIED PROBE TECHNIQUE, MAKING USE OF HIGH THROUGHPUT TECHNOLOGIES AS DESCRIBED BY CMS-2020-01-R: HCPCS

## 2021-12-30 PROCEDURE — U0005 INFEC AGEN DETEC AMPLI PROBE: HCPCS

## 2021-12-31 ENCOUNTER — E-VISIT (OUTPATIENT)
Dept: URGENT CARE | Facility: CLINIC | Age: 26
End: 2021-12-31
Payer: COMMERCIAL

## 2021-12-31 DIAGNOSIS — Z03.818 ENCOUNTER FOR PATIENT CONCERN ABOUT EXPOSURE TO INFECTIOUS ORGANISM: Primary | ICD-10-CM

## 2021-12-31 LAB — SARS-COV-2 RNA RESP QL NAA+PROBE: POSITIVE

## 2021-12-31 PROCEDURE — 99421 OL DIG E/M SVC 5-10 MIN: CPT | Performed by: NURSE PRACTITIONER

## 2022-01-01 RX ORDER — GUAIFENESIN 1200 MG/1
1200 TABLET, EXTENDED RELEASE ORAL 2 TIMES DAILY
Qty: 60 TABLET | Refills: 0 | Status: SHIPPED | OUTPATIENT
Start: 2022-01-01 | End: 2022-09-07

## 2022-01-01 RX ORDER — FLUTICASONE PROPIONATE 50 MCG
1 SPRAY, SUSPENSION (ML) NASAL DAILY
Qty: 11.1 ML | Refills: 0 | Status: SHIPPED | OUTPATIENT
Start: 2022-01-01 | End: 2022-01-15

## 2022-01-01 NOTE — PATIENT INSTRUCTIONS
Dear Tami Almaraz,    Based on the details you've shared, you do not need to be tested at this time and may continue to work.     Several reasons that people seek testing but where testing is not currently recommended:     You have not had direct contact with someone who has a confirmed (tested) positive case of Covid-19. An example is if your family member (e.g. child) was exposed to someone with Covid-19 and you have been exposed to your family member, THEY should get tested and you only need to be tested if they are positive.     We do not currently recommend testing for people who will be coming in contact with high risk people if you do not have symptoms right now (e.g. we do not test people prior to going to visit elderly or sick relatives).     We do not recommend testing for people who previously tested positive to see if they are still contagious. This is because the test can remain positive for up to 90 days after the first Covid test. We consider you non-contagious 10 days after your covid symptoms started or 20 days if you are immunocompromised. So you may return to normal activities 10 days (or 20 if immunocompromised) after symptoms started and do not need to be re-tested.     Please submit a new visit or call your clinic if you think we missed needed information, your exposure information has changed, or you develop symptoms.    What are the symptoms of COVID-19?  The most common symptoms are cough, fever and trouble breathing. Less common symptoms include headache, body aches, fatigue (feeling very tired), chills, sore throat, stuffy or runny nose, diarrhea (loose poop), loss of taste or smell, belly pain, and nausea or vomiting (feeling sick to your stomach or throwing up).    Where can I get more information?  [facility] - About COVID-19: [email or web address]  CDC - What to Do If You're Sick: www.cdc.gov/coronavirus/2019-ncov/about/steps-when-sick.html  CDC - [facility]  Isolation: www.cdc.gov/coronavirus/2019-ncov/hcp/disposition-in-home-patients.html  CDC - Caring for Someone: www.cdc.gov/coronavirus/2019-ncov/if-you-are-sick/care-for-someone.html  [facility] - Interim Guidance for Hospital Discharge to Home: www.[facility].mn.us/diseases/coronavirus/hcp/hospdischarge.pdf  [facility] clinical trials (COVID-19 research studies): clinicalaffairs.[facility]-clinical-trials  Below are the COVID-19 hotlines at the [place] Department of [facility] ([facility]). Interpreters are available.  For health questions: Call 746-630-8963 or 1-787.438.7485 (7 a.m. to 7 p.m.)  For questions about schools and childcare: Call 577-630-0751 or 1-827.336.2373 (7 a.m. to 7 p.m.)

## 2022-01-10 ENCOUNTER — E-VISIT (OUTPATIENT)
Dept: URGENT CARE | Facility: URGENT CARE | Age: 27
End: 2022-01-10
Payer: COMMERCIAL

## 2022-01-10 DIAGNOSIS — J02.9 SORE THROAT: ICD-10-CM

## 2022-01-10 DIAGNOSIS — Z20.822 SUSPECTED COVID-19 VIRUS INFECTION: ICD-10-CM

## 2022-01-10 PROCEDURE — 99207 PR NO CHARGE LOS: CPT | Performed by: PHYSICIAN ASSISTANT

## 2022-01-11 NOTE — PATIENT INSTRUCTIONS
Tami,    Your symptoms show that you may have coronavirus (COVID-19). This illness can cause fever, cough and trouble breathing. Many people get a mild case and get better on their own. Some people can get very sick.    Because you also reported sore throat, I would like to also test you for Strep Throat to determine if we need to treat you for that as well.    What should I do?  We would like to test you for COVID-19 virus and Strep Throat. I have placed orders for these tests. To schedule: go to your [employer] home page and scroll down to the section that says  You have an appointment that needs to be scheduled  and click the large green button that says  Schedule Now  and follow the steps to find the next available openings. It is important that when you are asked what the reason for your appointment is that you mention you need BOTH COVID and Strep tests.    If you are unable to complete these [employer] scheduling steps, please call [phone] to schedule your testing.     Return to work/school/ guidance:   Please let your workplace manager and staffing office know when your isolation ends.       If you receive a positive COVID-19 test result, follow the guidance of the those who are giving you the results. Usually the return to work is 10 days from symptom onset or positive test date (or in some cases 20 days if you are immunocompromised). If your symptoms started after your positive test, the 10 days should start when your symptoms started.   o If you work at [employer] [facility], you must also be cleared by Employee Occupational Health and Safety to return to work.      If you receive a negative COVID-19 test result and did not have a high risk exposure to someone with a known positive COVID-19 test, you can return to work once you're free of fever for 24 hours without fever-reducing medication and your symptoms are improving or resolved.    If you receive a negative COVID-19 test and if you had a high  risk exposure to someone who has tested positive for COVID-19 then you can return to work 14 days after your last contact with the positive individual. Follow quarantine guidance given by your doctor or public health officials.    Sign up for [email or web address].   We know it's scary to hear that you might have COVID-19. We want to track your symptoms to make sure you're okay over the next 2 weeks. Please look for an email from [email or web address]--this is a free, online program that we'll use to keep in touch. To sign up, follow the link in the email you will receive. Learn more at [email or web address]    How can I take care of myself?  Over the counter medications may help with your symptoms like congestion, cough, chills, or fever.    There are not many effective prescription treatments for early COVID-19. Hydroxychloroquine, ivermectin, and azithromycin are not effective or recommended for COVID-19.    If your symptoms started in the last 10 days, you may be able to receive a treatment with monoclonal antibodies. This treatment can lower your risk of severe illness and going to the hospital. It is given through an IV or under your skin (subcutaneous) and must be given at an infusion center. You must be 12 or older, weight at least 88 pounds, and have a positive COVID-19 test.      If you would like to sign up to be considered to receive the monoclonal antibody medicine, please complete a participation form through the [facility] here: MNRAP (https://www.health.[facility].mn.us/diseases/coronavirus/mnrap.html). You may also call the [facility] COVID-19 Public Hotline at 1-230.708.3972 (open Mon-Fri: 9am-7pm and Sat: 10am-6pm).     Not all people who are eligible will receive the medicine, since supply is limited. You will be contacted in the next 1 to 2 business days only if you are selected. If you do not receive a call, you have not been selected to receive the medicine. If you have any questions about  this medication, please contact your primary care provider. For more information, see https://www.health.[facility].mn.us/diseases/coronavirus/meds.pdf      Get lots of rest. Drink extra fluids (unless a doctor has told you not to)    Take Tylenol (acetaminophen) or ibuprofen for fever or pain. If you have liver or kidney problems, ask your family doctor if it's okay to take Tylenol or ibuprofen    Take over the counter medications for your symptoms, as directed by your doctor. You may also talk to your pharmacist.      If you have other health problems (like cancer, heart failure, an organ transplant or severe kidney disease): Call your specialty clinic if you don't feel better in the next 2 days.    Know when to call 911. Emergency warning signs include:  o Trouble breathing or shortness of breath  o Pain or pressure in the chest that doesn't go away  o Feeling confused like you haven't felt before, or not being able to wake up  o Bluish-colored lips or face    Where can I get more information?    [facility] - About COVID-19: [email or web address]     CDC - What to Do If You're Sick:   www.cdc.gov/coronavirus/2019-ncov/about/steps-when-sick.html    CDC - Ending Home Isolation:  https://www.cdc.gov/coronavirus/2019-ncov/your-health/quarantine-isolation.html    CDC - Caring for Someone:  www.cdc.gov/coronavirus/2019-ncov/if-you-are-sick/care-for-someone.html    [facility] clinical trials (COVID-19 research studies): clinicalaffairs.[facility]/n-clinical-trials    Below are the COVID-19 hotlines at the [facility] ([facility]). Interpreters are available.  o For health questions: Call 477-134-4522 or 1-220.915.3575 (7 a.m. to 7 p.m.)  o For questions about schools and childcare: Call 717-891-6138 or 1-669.446.1766 (7 a.m. to 7 p.m.)  January 10, 2022  RE:  Tami Almaraz                                                                                                                   [address]   [place] [address]      To whom it may concern:    I evaluated Tami Almaraz on January 10, 2022. Tami Almaraz should be excused from work/school.     They should let their workplace manager and staffing office know when their quarantine ends.    We can not give an exact date as it depends on the information below. They can calculate this on their own or work with their manager/staffing office to calculate this. (For example if they were exposed on 10/04, they would have to quarantine for 14 full days. That would be through 10/18. They could return on 10/19.)    Quarantine Guidelines:    If patient receives a positive COVID-19 test result, they should follow the guidance of those who are giving the results. Usually the return to work is 10 (or in some cases 20 days from symptom onset.) If they work at [employer], they must be cleared by Employee Occupational Health and Safety to return to work.      If patient receives a negative COVID-19 test result and did not have a high risk exposure to someone with a known positive COVID-19 test, they can return to work once they're free of fever for 24 hours without fever-reducing medication and their symptoms are improving or resolved.    If patient receives a negative COVID-19 test and if they had a high risk exposure to someone who has tested positive for COVID-19 then they can return to work 14 days after their last contact with the positive individual    Note: If there is ongoing exposure to the covid positive person, this quarantine period may be longer than 14 days. (For example, if they are continually exposed to their child, who tested positive and cannot isolate from them, then the quarantine of 7-14 days can't start until their child is no longer contagious. This is typically 10 days from onset to the child's symptoms. So the total duration may be 17-24 days in this case.)     Sincerely,  Zenon Arauz PA-C          o

## 2022-01-15 ENCOUNTER — LAB (OUTPATIENT)
Dept: URGENT CARE | Facility: URGENT CARE | Age: 27
End: 2022-01-15
Attending: PHYSICIAN ASSISTANT
Payer: COMMERCIAL

## 2022-01-15 DIAGNOSIS — J02.9 SORE THROAT: ICD-10-CM

## 2022-01-15 DIAGNOSIS — Z20.822 SUSPECTED COVID-19 VIRUS INFECTION: ICD-10-CM

## 2022-01-15 LAB
DEPRECATED S PYO AG THROAT QL EIA: NEGATIVE
GROUP A STREP BY PCR: NOT DETECTED
SARS-COV-2 RNA RESP QL NAA+PROBE: NEGATIVE

## 2022-01-15 PROCEDURE — U0005 INFEC AGEN DETEC AMPLI PROBE: HCPCS

## 2022-01-15 PROCEDURE — 87651 STREP A DNA AMP PROBE: CPT

## 2022-01-15 PROCEDURE — U0003 INFECTIOUS AGENT DETECTION BY NUCLEIC ACID (DNA OR RNA); SEVERE ACUTE RESPIRATORY SYNDROME CORONAVIRUS 2 (SARS-COV-2) (CORONAVIRUS DISEASE [COVID-19]), AMPLIFIED PROBE TECHNIQUE, MAKING USE OF HIGH THROUGHPUT TECHNOLOGIES AS DESCRIBED BY CMS-2020-01-R: HCPCS

## 2022-07-06 ENCOUNTER — PATIENT OUTREACH (OUTPATIENT)
Dept: OBGYN | Facility: CLINIC | Age: 27
End: 2022-07-06

## 2022-07-06 DIAGNOSIS — R87.612 PAPANICOLAOU SMEAR OF CERVIX WITH LOW GRADE SQUAMOUS INTRAEPITHELIAL LESION (LGSIL): Primary | ICD-10-CM

## 2022-07-06 NOTE — TELEPHONE ENCOUNTER
Patient due for Pap.    Reminder done today via [employer].     pt has been seen at outside clinic. No pap completed

## 2022-07-18 ENCOUNTER — OFFICE VISIT (OUTPATIENT)
Dept: URGENT CARE | Facility: URGENT CARE | Age: 27
End: 2022-07-18
Payer: COMMERCIAL

## 2022-07-18 VITALS
DIASTOLIC BLOOD PRESSURE: 73 MMHG | WEIGHT: 269.2 LBS | TEMPERATURE: 97 F | BODY MASS INDEX: 40.8 KG/M2 | OXYGEN SATURATION: 100 % | HEIGHT: 68 IN | SYSTOLIC BLOOD PRESSURE: 112 MMHG | HEART RATE: 69 BPM

## 2022-07-18 DIAGNOSIS — N89.8 VAGINAL DISCHARGE: Primary | ICD-10-CM

## 2022-07-18 LAB
ALBUMIN UR-MCNC: NEGATIVE MG/DL
APPEARANCE UR: CLEAR
BILIRUB UR QL STRIP: NEGATIVE
CLUE CELLS: ABNORMAL
COLOR UR AUTO: YELLOW
GLUCOSE UR STRIP-MCNC: NEGATIVE MG/DL
HGB UR QL STRIP: NEGATIVE
KETONES UR STRIP-MCNC: NEGATIVE MG/DL
LEUKOCYTE ESTERASE UR QL STRIP: NEGATIVE
NITRATE UR QL: NEGATIVE
PH UR STRIP: 6 [PH] (ref 5–7)
SP GR UR STRIP: >=1.03 (ref 1–1.03)
TRICHOMONAS, WET PREP: ABNORMAL
UROBILINOGEN UR STRIP-ACNC: 1 E.U./DL
WBC'S/HIGH POWER FIELD, WET PREP: ABNORMAL
YEAST, WET PREP: ABNORMAL

## 2022-07-18 PROCEDURE — 87210 SMEAR WET MOUNT SALINE/INK: CPT | Performed by: PHYSICIAN ASSISTANT

## 2022-07-18 PROCEDURE — 81003 URINALYSIS AUTO W/O SCOPE: CPT | Performed by: PHYSICIAN ASSISTANT

## 2022-07-18 PROCEDURE — 99213 OFFICE O/P EST LOW 20 MIN: CPT | Mod: 25 | Performed by: PHYSICIAN ASSISTANT

## 2022-07-18 PROCEDURE — 87591 N.GONORRHOEAE DNA AMP PROB: CPT | Performed by: PHYSICIAN ASSISTANT

## 2022-07-18 PROCEDURE — 96372 THER/PROPH/DIAG INJ SC/IM: CPT | Performed by: PHYSICIAN ASSISTANT

## 2022-07-18 PROCEDURE — 87491 CHLMYD TRACH DNA AMP PROBE: CPT | Performed by: PHYSICIAN ASSISTANT

## 2022-07-18 RX ORDER — AZITHROMYCIN 500 MG/1
1000 TABLET, FILM COATED ORAL ONCE
Status: COMPLETED | OUTPATIENT
Start: 2022-07-18 | End: 2022-07-18

## 2022-07-18 RX ADMIN — AZITHROMYCIN 1000 MG: 500 TABLET, FILM COATED ORAL at 18:11

## 2022-07-18 ASSESSMENT — ENCOUNTER SYMPTOMS
CONSTITUTIONAL NEGATIVE: 1
ADENOPATHY: 0
COUGH: 0
FREQUENCY: 1
ACTIVITY CHANGE: 0
FATIGUE: 0
RESPIRATORY NEGATIVE: 1
CHILLS: 0
RHINORRHEA: 0
DIZZINESS: 0
PALPITATIONS: 0
NAUSEA: 0
NEUROLOGICAL NEGATIVE: 1
ABDOMINAL PAIN: 0
POLYDIPSIA: 0
FEVER: 0
HEMATURIA: 0
VOMITING: 0
CARDIOVASCULAR NEGATIVE: 1
SORE THROAT: 0
DYSURIA: 1
WEAKNESS: 0
SHORTNESS OF BREATH: 0
NECK STIFFNESS: 0
DIARRHEA: 0
FLANK PAIN: 0
GASTROINTESTINAL NEGATIVE: 1
MYALGIAS: 0
ENDOCRINE NEGATIVE: 1
HEADACHES: 0
MUSCULOSKELETAL NEGATIVE: 1
NECK PAIN: 0
LIGHT-HEADEDNESS: 0

## 2022-07-18 NOTE — PROGRESS NOTES
Chief Complaint:    Chief Complaint   Patient presents with     Vaginal Discharge     Vaginal Itching     Frequency     Urinary Burn       ASSESSMENT     1. Vaginal discharge         PLAN    Urinalysis discussed with patient  Wet prep was negative.  With symptoms, patient given 500 Mg Rocephin IM in clinic today and 1G Zithromax PO in clinic today.  We will call with STD results when available.  Follow up with PCP in 2-3 days if symptoms are not improving.  Worrisome symptoms discussed with instructions to go to the ED.  Patient verbalized understanding and agreed with this plan.    Labs:     Results for orders placed or performed in visit on 07/18/22   UA reflex to Microscopic and Culture     Status: Normal    Specimen: Urine, Midstream   Result Value Ref Range    Color Urine Yellow Colorless, Straw, Light Yellow, Yellow    Appearance Urine Clear Clear    Glucose Urine Negative Negative mg/dL    Bilirubin Urine Negative Negative    Ketones Urine Negative Negative mg/dL    Specific Gravity Urine >=1.030 1.003 - 1.035    Blood Urine Negative Negative    pH Urine 6.0 5.0 - 7.0    Protein Albumin Urine Negative Negative mg/dL    Urobilinogen Urine 1.0 0.2, 1.0 E.U./dL    Nitrite Urine Negative Negative    Leukocyte Esterase Urine Negative Negative    Narrative    Microscopic not indicated   Wet preparation     Status: Abnormal    Specimen: Vagina; Swab   Result Value Ref Range    Trichomonas Absent Absent    Yeast Absent Absent    Clue Cells Absent Absent    WBCs/high power field 2+ (A) None       Problem history    Patient Active Problem List   Diagnosis     Morbid obesity (H)     Papanicolaou smear of cervix with low grade squamous intraepithelial lesion (LGSIL)     Serosal uterine leiomyoma     Major depressive disorder with single episode, in full remission (H)       Current Meds    Current Outpatient Medications:      ciprofloxacin (CIPRO) 500 MG tablet, , Disp: , Rfl:      guaiFENesin 1200 MG TB12, Take 1 tablet  (1,200 mg) by mouth 2 times daily (Patient not taking: Reported on 7/18/2022), Disp: 60 tablet, Rfl: 0    Current Facility-Administered Medications:      azithromycin (ZITHROMAX) tablet 1,000 mg, 1,000 mg, Oral, Once, Gigi Ojeda PA-C     cefTRIAXone (ROCEPHIN) injection 500 mg, 500 mg, Intramuscular, Once, Gigi Ojeda PA-C    Allergies  Allergies   Allergen Reactions     Compazine [Prochlorperazine]      Morphine      Reglan [Metoclopramide]      Toradol [Ketorolac]      Vicodin [Hydrocodone-Acetaminophen]      Only allergy to hydrocodone, able to take tylenol     Zyprexa [Olanzapine]        SUBJECTIVE    HPI:  Tami Almaraz is a 26 year old female who has symptoms of urinary dysuria, frequency and vaginal discharge for 3 day(s).  she denies back pain, nausea, vomiting, fever and chills, flank pain, and vaginal odor.    ROS:      Review of Systems   Constitutional: Negative.  Negative for activity change, chills, fatigue and fever.   HENT: Negative for congestion, ear pain, rhinorrhea and sore throat.    Respiratory: Negative.  Negative for cough and shortness of breath.    Cardiovascular: Negative.  Negative for chest pain and palpitations.   Gastrointestinal: Negative.  Negative for abdominal pain, diarrhea, nausea and vomiting.   Endocrine: Negative.  Negative for polydipsia and polyuria.   Genitourinary: Positive for dysuria, frequency and vaginal discharge. Negative for flank pain, hematuria, pelvic pain, urgency and vaginal pain.   Musculoskeletal: Negative.  Negative for myalgias, neck pain and neck stiffness.   Allergic/Immunologic: Negative for immunocompromised state.   Neurological: Negative.  Negative for dizziness, weakness, light-headedness and headaches.   Hematological: Negative for adenopathy.       Family History   Family History   Problem Relation Age of Onset     Crohn's Disease Mother      Hypertension Father      Colitis Sister      Pancreatitis Sister         Social  "History  Social History     Socioeconomic History     Marital status: Single     Spouse name: partner- Jeffy     Number of children: 1     Years of education: Not on file     Highest education level: Not on file   Occupational History     Occupation: unemployed   Tobacco Use     Smoking status: Former Smoker     Types: Cigars     Smokeless tobacco: Never Used   Vaping Use     Vaping Use: Never used   Substance and Sexual Activity     Alcohol use: Not Currently     Drug use: Never     Sexual activity: Yes     Partners: Male     Birth control/protection: Condom   Other Topics Concern     Not on file   Social History Narrative     Not on file     Social Determinants of Health     Financial Resource Strain: Not on file   Food Insecurity: Not on file   Transportation Needs: Not on file   Physical Activity: Not on file   Stress: Not on file   Social Connections: Not on file   Intimate Partner Violence: Not on file   Housing Stability: Not on file           OBJECTIVE     Vital signs noted and reviewed by Gigi Ojeda PA-C  /73 (BP Location: Left arm, Patient Position: Sitting, Cuff Size: Adult Large)   Pulse 69   Temp 97  F (36.1  C) (Tympanic)   Ht 1.727 m (5' 8\")   Wt 122.1 kg (269 lb 3.2 oz)   SpO2 100%   BMI 40.93 kg/m       Physical Exam  Vitals and nursing note reviewed.   Constitutional:       General: She is not in acute distress.     Appearance: Normal appearance. She is well-developed. She is not ill-appearing, toxic-appearing or diaphoretic.   HENT:      Head: Normocephalic and atraumatic.      Right Ear: Tympanic membrane and external ear normal.      Left Ear: Tympanic membrane and external ear normal.   Eyes:      Pupils: Pupils are equal, round, and reactive to light.   Cardiovascular:      Rate and Rhythm: Normal rate and regular rhythm.      Heart sounds: Normal heart sounds. No murmur heard.    No friction rub. No gallop.   Pulmonary:      Effort: Pulmonary effort is normal. No " respiratory distress.      Breath sounds: Normal breath sounds. No wheezing or rales.   Chest:      Chest wall: No tenderness.   Abdominal:      General: Bowel sounds are normal. There is no distension.      Palpations: Abdomen is soft. Abdomen is not rigid. There is no mass.      Tenderness: There is no abdominal tenderness. There is no guarding or rebound. Negative signs include Mejia's sign and McBurney's sign.   Musculoskeletal:      Cervical back: Normal range of motion and neck supple.   Lymphadenopathy:      Cervical: No cervical adenopathy.   Skin:     General: Skin is warm and dry.   Neurological:      Mental Status: She is alert and oriented to person, place, and time.      Cranial Nerves: No cranial nerve deficit.      Deep Tendon Reflexes: Reflexes are normal and symmetric.   Psychiatric:         Behavior: Behavior normal. Behavior is cooperative.         Thought Content: Thought content normal.         Judgment: Judgment normal.             Gigi Ojeda PA-C  7/18/2022, 4:46 PM

## 2022-07-19 LAB
C TRACH DNA SPEC QL NAA+PROBE: NEGATIVE
N GONORRHOEA DNA SPEC QL NAA+PROBE: NEGATIVE

## 2022-09-07 ENCOUNTER — OFFICE VISIT (OUTPATIENT)
Dept: OBGYN | Facility: CLINIC | Age: 27
End: 2022-09-07
Payer: COMMERCIAL

## 2022-09-07 VITALS
OXYGEN SATURATION: 96 % | WEIGHT: 269.2 LBS | BODY MASS INDEX: 40.8 KG/M2 | DIASTOLIC BLOOD PRESSURE: 79 MMHG | HEIGHT: 68 IN | HEART RATE: 66 BPM | SYSTOLIC BLOOD PRESSURE: 124 MMHG

## 2022-09-07 DIAGNOSIS — Z11.59 NEED FOR HEPATITIS C SCREENING TEST: ICD-10-CM

## 2022-09-07 DIAGNOSIS — N89.8 VAGINAL DISCHARGE: ICD-10-CM

## 2022-09-07 DIAGNOSIS — R87.612 PAPANICOLAOU SMEAR OF CERVIX WITH LOW GRADE SQUAMOUS INTRAEPITHELIAL LESION (LGSIL): ICD-10-CM

## 2022-09-07 DIAGNOSIS — Z01.419 ENCOUNTER FOR GYNECOLOGICAL EXAMINATION WITHOUT ABNORMAL FINDING: Primary | ICD-10-CM

## 2022-09-07 DIAGNOSIS — N92.6 IRREGULAR MENSES: ICD-10-CM

## 2022-09-07 DIAGNOSIS — A59.9 TRICHOMONAS INFECTION: ICD-10-CM

## 2022-09-07 LAB
CLUE CELLS: ABNORMAL
TRICHOMONAS, WET PREP: PRESENT
WBC'S/HIGH POWER FIELD, WET PREP: ABNORMAL
YEAST, WET PREP: ABNORMAL

## 2022-09-07 PROCEDURE — 88175 CYTOPATH C/V AUTO FLUID REDO: CPT | Performed by: NURSE PRACTITIONER

## 2022-09-07 PROCEDURE — 87210 SMEAR WET MOUNT SALINE/INK: CPT | Performed by: NURSE PRACTITIONER

## 2022-09-07 PROCEDURE — 99213 OFFICE O/P EST LOW 20 MIN: CPT | Mod: 25 | Performed by: NURSE PRACTITIONER

## 2022-09-07 PROCEDURE — 99395 PREV VISIT EST AGE 18-39: CPT | Performed by: NURSE PRACTITIONER

## 2022-09-07 PROCEDURE — 36415 COLL VENOUS BLD VENIPUNCTURE: CPT | Performed by: NURSE PRACTITIONER

## 2022-09-07 PROCEDURE — 86803 HEPATITIS C AB TEST: CPT | Performed by: NURSE PRACTITIONER

## 2022-09-07 RX ORDER — METRONIDAZOLE 500 MG/1
500 TABLET ORAL 2 TIMES DAILY
Qty: 14 TABLET | Refills: 0 | Status: SHIPPED | OUTPATIENT
Start: 2022-09-07 | End: 2022-09-14

## 2022-09-07 ASSESSMENT — ENCOUNTER SYMPTOMS
PARESTHESIAS: 1
CHILLS: 0
ARTHRALGIAS: 0
DYSURIA: 0
JOINT SWELLING: 0
COUGH: 0
EYE PAIN: 0
ABDOMINAL PAIN: 0
PALPITATIONS: 0
NERVOUS/ANXIOUS: 0
MYALGIAS: 0
HEMATOCHEZIA: 0
DIZZINESS: 0
HEADACHES: 0
SORE THROAT: 0
HEARTBURN: 0
SHORTNESS OF BREATH: 0
HEMATURIA: 0
WEAKNESS: 0
NAUSEA: 0
CONSTIPATION: 0
FEVER: 0
DIARRHEA: 0
FREQUENCY: 1

## 2022-09-07 ASSESSMENT — PATIENT HEALTH QUESTIONNAIRE - PHQ9
SUM OF ALL RESPONSES TO PHQ QUESTIONS 1-9: 15
10. IF YOU CHECKED OFF ANY PROBLEMS, HOW DIFFICULT HAVE THESE PROBLEMS MADE IT FOR YOU TO DO YOUR WORK, TAKE CARE OF THINGS AT HOME, OR GET ALONG WITH OTHER PEOPLE: SOMEWHAT DIFFICULT
SUM OF ALL RESPONSES TO PHQ QUESTIONS 1-9: 15

## 2022-09-07 ASSESSMENT — PAIN SCALES - GENERAL: PAINLEVEL: NO PAIN (0)

## 2022-09-07 NOTE — PATIENT INSTRUCTIONS
If you have any questions regarding your visit, Please contact your care team.     [facility]: [phone]  To Schedule an Appointment 24/7  Call: [phone] HOURS TELEPHONE NUMBER     Mine Sanz- APRN CNP      Pastor Powell-KEISHA Summers-KEISHA Read-Surgery Scheduler  Lissy-Surgery Scheduler         Monday 7:30 am-5:00 pm    Tuesday 8:00 am-4:00 pm    Wednesday 7:30 am-4:00 pm  [place]    Thursday 8:00 am-11:00 am    Friday 7:30 am-4:00 pm [place] [address] [place] [address] [phone] ask for [facility]  [phone] Fax    Imaging Scheduling all locations  [phone]     [facility]  [address] [place] [address] Dr.  [place], [place] [id number] [phone]         Urgent Care locations:  [facility]   Monday-Friday  10 am - 8 pm  Saturday and Sunday   9 am - 5 pm     [phone]   If you need a medication refill, please contact your pharmacy. Please allow 3 business days for your refill to be completed.  As always, Thank you for trusting us with your healthcare needs!      see additional instructions from your care team below

## 2022-09-07 NOTE — PROGRESS NOTES
SUBJECTIVE:   CC: Tami Almaraz is an 26 year old woman who presents for preventive health visit.     Patient has been advised of split billing requirements and indicates understanding: Yes  Healthy Habits:     Getting at least 3 servings of Calcium per day:  NO    Bi-annual eye exam:  Yes    Dental care twice a year:  NO    Sleep apnea or symptoms of sleep apnea:  None    Diet:  Regular (no restrictions)    Frequency of exercise:  None    Taking medications regularly:  No    Barriers to taking medications:  Other    Medication side effects:  None    PHQ-2 Total Score: 1    Additional concerns today:  No    Patient has a history of irregular menstrual cycles. 2 years ago, went 8 or 9 months without a cycle. Does use Nuva Ring periodically and does get withdrawal bleeding when she uses it. Cycles stop then once she discontinues. Does not that cycles used to be more regular when she was younger, as her weight increased, they became more irregular. Patient had labs done earlier this year at [facility] that showed an elevated FSH:LH ratio. Would consider pregnancy in a few years, but not now. Stopped Nuva Ring in April and no menses since. Denies pregnancy chance, no concerns with vision changes, galactorrhea, headaches.  Requests screening for BV-gets this often and is having vaginal discharge.    Today's PHQ-2 Score:   PHQ-2 ( 1999 [employer]) 9/7/2022   Q1: Little interest or pleasure in doing things 1   Q2: Feeling down, depressed or hopeless 0   PHQ-2 Score 1   PHQ-2 Total Score (12-17 Years)- Positive if 3 or more points; Administer PHQ-A if positive -   Q1: Little interest or pleasure in doing things Several days   Q2: Feeling down, depressed or hopeless Not at all   PHQ-2 Score 1       Abuse: Current or Past (Physical, Sexual or Emotional) - No  Do you feel safe in your environment? Yes        Social History     Tobacco Use     Smoking status: Former Smoker     Types: Cigars     Smokeless tobacco: Never  Used   Substance Use Topics     Alcohol use: Not Currently         Alcohol Use 9/7/2022   Prescreen: >3 drinks/day or >7 drinks/week? Not Applicable   No flowsheet data found.    Reviewed orders with patient.  Reviewed health maintenance and updated orders accordingly - Yes  Patient Active Problem List   Diagnosis     Morbid obesity (H)     Papanicolaou smear of cervix with low grade squamous intraepithelial lesion (LGSIL)     Serosal uterine leiomyoma     Major depressive disorder with single episode, in full remission (H)     Past Surgical History:   Procedure Laterality Date     NO HISTORY OF SURGERY         Social History     Tobacco Use     Smoking status: Former Smoker     Types: Cigars     Smokeless tobacco: Never Used   Substance Use Topics     Alcohol use: Not Currently     Family History   Problem Relation Age of Onset     Crohn's Disease Mother      Hypertension Father      Colitis Sister      Pancreatitis Sister            Breast Cancer Screening:  Any new diagnosis of family breast, ovarian, or bowel cancer? No    Patient under 40 years of age: Routine Mammogram Screening not recommended.   Pertinent mammograms are reviewed under the imaging tab.    History of abnormal Pap smear: YES - updated in Problem List and Health Maintenance accordingly  PAP / HPV Latest Ref Rng & Units 7/20/2021 7/15/2020   PAP   Negative for Intraepithelial Lesion or Malignancy (NILM) -   PAP (Historical) - - LSIL(A)     Reviewed and updated as needed this visit by clinical staff   Tobacco  Allergies  Meds   Med Hx  Surg Hx  Fam Hx  Soc Hx          Reviewed and updated as needed this visit by Provider                   Past Medical History:   Diagnosis Date     Abnormal Pap smear of cervix 07/15/2020    see problem list     Chlamydia 2015     Major depressive disorder with single episode, in full remission (H) 2021     Morbid obesity (H) 07/15/2020     Serosal uterine leiomyoma 2020      Past Surgical History:   Procedure  "Laterality Date     NO HISTORY OF SURGERY         Review of Systems  CONSTITUTIONAL: NEGATIVE for fever, chills, change in weight  INTEGUMENTARU/SKIN: NEGATIVE for worrisome rashes, moles or lesions  EYES: NEGATIVE for vision changes or irritation  ENT: NEGATIVE for ear, mouth and throat problems  RESP: NEGATIVE for significant cough or SOB  BREAST: NEGATIVE for masses, tenderness or discharge  CV: NEGATIVE for chest pain, palpitations or peripheral edema  GI: NEGATIVE for nausea, abdominal pain, heartburn, or change in bowel habits  : NEGATIVE for unusual urinary or vaginal symptoms. Periods: see above  MUSCULOSKELETAL: NEGATIVE for significant arthralgias or myalgia  NEURO: NEGATIVE for weakness, dizziness or paresthesias  PSYCHIATRIC: NEGATIVE for changes in mood or affect     OBJECTIVE:   /79 (BP Location: Right arm, Patient Position: Sitting, Cuff Size: Adult Large)   Pulse 66   Ht 1.727 m (5' 8\")   Wt 122.1 kg (269 lb 3.2 oz)   LMP 04/03/2022 (Exact Date)   SpO2 96%   BMI 40.93 kg/m    Physical Exam  GENERAL: healthy, alert and no distress  NECK: no adenopathy, no asymmetry, masses, or scars and thyroid normal to palpation  RESP: lungs clear to auscultation - no rales, rhonchi or wheezes  BREAST: normal without masses, tenderness or nipple discharge and no palpable axillary masses or adenopathy  CV: regular rate and rhythm, normal S1 S2, no S3 or S4, no murmur, click or rub, no peripheral edema and peripheral pulses strong  ABDOMEN: soft, nontender, no hepatosplenomegaly, no masses and bowel sounds normal   (female): normal female external genitalia, normal urethral meatus, vaginal mucosa pink, moist, well rugated, and normal cervix/adnexa/uterus without masses or discharge  MS: no gross musculoskeletal defects noted, no edema  SKIN: no suspicious lesions or rashes  NEURO: Normal strength and tone, mentation intact and speech normal  PSYCH: mentation appears normal, affect " normal/bright    Diagnostic Test Results:  Labs reviewed in Epic  Results for orders placed or performed in visit on 09/07/22 (from the past 24 hour(s))   Wet prep - Clinic Collect    Specimen: Vagina; Swab   Result Value Ref Range    Trichomonas Present (A) Absent    Yeast Absent Absent    Clue Cells Absent Absent    WBCs/high power field 3+ (A) None       ASSESSMENT/PLAN:   (Z01.419) Encounter for gynecological examination without abnormal finding  (primary encounter diagnosis)  Comment: Health maintenance reviewed    (R87.612) Papanicolaou smear of cervix with low grade squamous intraepithelial lesion (LGSIL)  Comment: Follow up based on result  Plan: Pap Diagnostic only      (Z11.59) Need for hepatitis C screening test  Plan: Hepatitis C Screen Reflex to HCV RNA Quant and         Genotype    (N92.6) Irregular menses  Comment: We discussed her irregular menstrual cycles. Reviewed her work up done earlier this year. Reviewed recommendation for menstrual bleeding at least Q 3 months and rationale. Discussed option of continuous use of contraception, use of progesterone challenge Q 3 months if no spontaneous cycle occurs. Patient has Nuva Rings at home and prefers to use this, may not stay on it consistently. We discussed her pregnancy questions as well given her irregular menstrual cycles, discussed possible need for ovarian stimulation, recommend considering weight loss to help improve cycle regularity and ovulation. Patient is given an opportunity to ask questions and have them answered.    (N89.8) Vaginal discharge  Plan: Wet prep - Clinic Collect          (A59.9) Trichomonas infection  Comment: See result note-patient will be notified and instructed on medication use, partner treatment and follow up screening.  Plan: metroNIDAZOLE (FLAGYL) 500 MG tablet          Patient has been advised of split billing requirements and indicates understanding: Yes    COUNSELING:  Reviewed preventive health counseling, as  "reflected in patient instructions  Special attention given to:        Regular exercise       Healthy diet/nutrition       Contraception       Consider Hep C screening for all patients one time for ages 18-79 years    Estimated body mass index is 40.93 kg/m  as calculated from the following:    Height as of this encounter: 1.727 m (5' 8\").    Weight as of this encounter: 122.1 kg (269 lb 3.2 oz).    Weight management plan: Discussed healthy diet and exercise guidelines    She reports that she has quit smoking. Her smoking use included cigars. She has never used smokeless tobacco.    12 minutes spent on the date of the encounter doing chart review, history and exam, documentation and further activities per the note - this is over and above time spent on the preventive visit.    Counseling Resources:  ATP IV Guidelines  Pooled Cohorts Equation Calculator  Breast Cancer Risk Calculator  BRCA-Related Cancer Risk Assessment: FHS-7 Tool  FRAX Risk Assessment  ICSI Preventive Guidelines  Dietary Guidelines for Americans, 2010  vivio's MyPlate  ASA Prophylaxis  Lung CA Screening    RYLEY Meza [facility]  Answers for HPI/ROS submitted by the patient on 9/7/2022  If you checked off any problems, how difficult have these problems made it for you to do your work, take care of things at home, or get along with other people?: Somewhat difficult  PHQ9 TOTAL SCORE: 15      "

## 2022-09-08 LAB — HCV AB SERPL QL IA: NONREACTIVE

## 2022-09-09 LAB
BKR LAB AP GYN ADEQUACY: NORMAL
BKR LAB AP GYN INTERPRETATION: NORMAL
BKR LAB AP HPV REFLEX: NO
BKR LAB AP PREVIOUS ABNL DX: NORMAL
BKR LAB AP PREVIOUS ABNORMAL: NORMAL
PATH REPORT.COMMENTS IMP SPEC: NORMAL
PATH REPORT.COMMENTS IMP SPEC: NORMAL
PATH REPORT.RELEVANT HX SPEC: NORMAL

## 2022-09-18 ENCOUNTER — HEALTH MAINTENANCE LETTER (OUTPATIENT)
Age: 27
End: 2022-09-18

## 2023-09-19 ENCOUNTER — OFFICE VISIT (OUTPATIENT)
Dept: OBGYN | Facility: CLINIC | Age: 28
End: 2023-09-19
Payer: COMMERCIAL

## 2023-09-19 VITALS
HEIGHT: 68 IN | OXYGEN SATURATION: 95 % | WEIGHT: 257.6 LBS | SYSTOLIC BLOOD PRESSURE: 113 MMHG | BODY MASS INDEX: 39.04 KG/M2 | HEART RATE: 61 BPM | DIASTOLIC BLOOD PRESSURE: 76 MMHG

## 2023-09-19 DIAGNOSIS — F33.9 EPISODE OF RECURRENT MAJOR DEPRESSIVE DISORDER, UNSPECIFIED DEPRESSION EPISODE SEVERITY (H): ICD-10-CM

## 2023-09-19 DIAGNOSIS — Z11.3 SCREEN FOR STD (SEXUALLY TRANSMITTED DISEASE): Primary | ICD-10-CM

## 2023-09-19 DIAGNOSIS — N76.0 ACUTE VAGINITIS: ICD-10-CM

## 2023-09-19 DIAGNOSIS — R35.0 URINARY FREQUENCY: ICD-10-CM

## 2023-09-19 DIAGNOSIS — N30.00 ACUTE CYSTITIS WITHOUT HEMATURIA: ICD-10-CM

## 2023-09-19 DIAGNOSIS — L29.2 VULVAR ITCHING: ICD-10-CM

## 2023-09-19 LAB
ALBUMIN UR-MCNC: 30 MG/DL
APPEARANCE UR: CLEAR
BACTERIA #/AREA URNS HPF: ABNORMAL /HPF
BILIRUB UR QL STRIP: NEGATIVE
CLUE CELLS: PRESENT
COLOR UR AUTO: YELLOW
GLUCOSE UR STRIP-MCNC: NEGATIVE MG/DL
HGB UR QL STRIP: NEGATIVE
KETONES UR STRIP-MCNC: ABNORMAL MG/DL
LEUKOCYTE ESTERASE UR QL STRIP: NEGATIVE
MUCOUS THREADS #/AREA URNS LPF: PRESENT /LPF
NITRATE UR QL: POSITIVE
PH UR STRIP: 6 [PH] (ref 5–7)
RBC #/AREA URNS AUTO: ABNORMAL /HPF
SP GR UR STRIP: 1.02 (ref 1–1.03)
SQUAMOUS #/AREA URNS AUTO: ABNORMAL /LPF
TRICHOMONAS, WET PREP: ABNORMAL
UROBILINOGEN UR STRIP-ACNC: 1 E.U./DL
WBC #/AREA URNS AUTO: ABNORMAL /HPF
WBC'S/HIGH POWER FIELD, WET PREP: ABNORMAL
YEAST, WET PREP: PRESENT

## 2023-09-19 PROCEDURE — 87491 CHLMYD TRACH DNA AMP PROBE: CPT | Performed by: NURSE PRACTITIONER

## 2023-09-19 PROCEDURE — 87340 HEPATITIS B SURFACE AG IA: CPT | Performed by: NURSE PRACTITIONER

## 2023-09-19 PROCEDURE — 87591 N.GONORRHOEAE DNA AMP PROB: CPT | Performed by: NURSE PRACTITIONER

## 2023-09-19 PROCEDURE — 87389 HIV-1 AG W/HIV-1&-2 AB AG IA: CPT | Performed by: NURSE PRACTITIONER

## 2023-09-19 PROCEDURE — 87086 URINE CULTURE/COLONY COUNT: CPT | Performed by: NURSE PRACTITIONER

## 2023-09-19 PROCEDURE — 99213 OFFICE O/P EST LOW 20 MIN: CPT | Performed by: NURSE PRACTITIONER

## 2023-09-19 PROCEDURE — 87210 SMEAR WET MOUNT SALINE/INK: CPT | Performed by: NURSE PRACTITIONER

## 2023-09-19 PROCEDURE — 87186 SC STD MICRODIL/AGAR DIL: CPT | Performed by: NURSE PRACTITIONER

## 2023-09-19 PROCEDURE — 86803 HEPATITIS C AB TEST: CPT | Performed by: NURSE PRACTITIONER

## 2023-09-19 PROCEDURE — 36415 COLL VENOUS BLD VENIPUNCTURE: CPT | Performed by: NURSE PRACTITIONER

## 2023-09-19 PROCEDURE — 81001 URINALYSIS AUTO W/SCOPE: CPT | Performed by: NURSE PRACTITIONER

## 2023-09-19 PROCEDURE — 86780 TREPONEMA PALLIDUM: CPT | Performed by: NURSE PRACTITIONER

## 2023-09-19 RX ORDER — FLUCONAZOLE 150 MG/1
150 TABLET ORAL ONCE
Qty: 2 TABLET | Refills: 0 | Status: SHIPPED | OUTPATIENT
Start: 2023-09-19 | End: 2023-09-19

## 2023-09-19 RX ORDER — METRONIDAZOLE 7.5 MG/G
1 GEL VAGINAL AT BEDTIME
Qty: 70 G | Refills: 0 | Status: SHIPPED | OUTPATIENT
Start: 2023-09-19 | End: 2023-09-24

## 2023-09-19 ASSESSMENT — PATIENT HEALTH QUESTIONNAIRE - PHQ9
SUM OF ALL RESPONSES TO PHQ QUESTIONS 1-9: 15
10. IF YOU CHECKED OFF ANY PROBLEMS, HOW DIFFICULT HAVE THESE PROBLEMS MADE IT FOR YOU TO DO YOUR WORK, TAKE CARE OF THINGS AT HOME, OR GET ALONG WITH OTHER PEOPLE: VERY DIFFICULT
SUM OF ALL RESPONSES TO PHQ QUESTIONS 1-9: 15

## 2023-09-19 NOTE — PROGRESS NOTES
Depression Response    Patient completed the PHQ-9 assessment for depression and scored >9? Yes  Question 9 on the PHQ-9 was positive for suicidality? No  Does patient have current mental health provider? No    Is this a virtual visit? No    I personally notified the following: visit provider         Answers submitted by the patient for this visit:  Patient Health Questionnaire (Submitted on 9/19/2023)  If you checked off any problems, how difficult have these problems made it for you to do your work, take care of things at home, or get along with other people?: Very difficult  PHQ9 TOTAL SCORE: 15

## 2023-09-19 NOTE — PATIENT INSTRUCTIONS
If you have any questions regarding your visit, Please contact your care team.     [employer] [facility] Services: [phone]  To Schedule an Appointment 24/7  Call: [phone] TELEPH[facility] NUM[facility]     Mine Sanz- APRN CNP      Pastor Zendejas-Surgery Scheduler  Lissy-Surgery Scheduler         Monday 7:30am-2:00pm    Tuesday 7:30am-4:00pm    Wednesday 7:30am-2:00pm    Thursday 7:30am-11:00am    Friday 7:30am-2:00pm [address] [place] [address] [phone] ask for [facility]  [phone] Fax    Imaging Scheduling all locations  [phone]    [facility] Labor and Delivery  [address] [place] [address] [address]  [place], [place] [id number] [phone]         Urgent Care locations:  [facility]   Monday-Friday  10 am - 8 pm  Saturday and Sunday   9 am - 5 pm     [phone]   If you need a medication refill, please contact your pharmacy. Please allow 3 business days for your refill to be completed.  As always, Thank you for trusting us with your healthcare needs!      see additional instructions from your care team below

## 2023-09-19 NOTE — PROGRESS NOTES
Assessment & Plan     Screen for STD (sexually transmitted disease)  - Chlamydia trachomatis PCR  - Neisseria gonorrhoeae PCR  - Hepatitis B surface antigen; Future  - HIV Antigen Antibody Combo Cascade; Future  - Treponema Abs w Reflex to RPR and Titer; Future  - Hepatitis C antibody; Future  - Wet preparation  - Hepatitis B surface antigen  - HIV Antigen Antibody Combo Cascade  - Treponema Abs w Reflex to RPR and Titer  - Hepatitis C antibody    Vulvar itching  - UA with Microscopic; Future  - Urine Culture; Future  - UA with Microscopic  - Urine Culture  - Urine Microscopic Exam    Urinary frequency  Await culture and treat if positive  - UA with Microscopic; Future  - Urine Culture; Future  - UA with Microscopic  - Urine Culture  - Urine Microscopic Exam    Episode of recurrent major depressive disorder, unspecified depression episode severity (H)  Patient prefers to remain off medication, would like referral to Mental Health.   - Adult Mental Health  Referral; Future    Acute vaginitis  Will treat for both infections, preferred Metrogel for BV and this is sent along with prescription for the yeast vaginitis.   - metroNIDAZOLE (METROGEL) 0.75 % vaginal gel; Place 1 applicator (5 g) vaginally At Bedtime for 5 days  - fluconazole (DIFLUCAN) 150 MG tablet; Take 1 tablet (150 mg) by mouth once for 1 dose Repeat with the second tablet in 1 week    RYLEY Meza [facility]   Tami is a 27 year old, presenting for the following health issues:  STD (Screening )    HPI     STD screening    Symptoms ongoing for the last 2 weeks. Feels vulvar swelling now, discharge is the same but noting vulvar itching, odor along with incomplete emptying with urination, some urinary urgency and frequency and urine odor. No hematuria, low back pain, nausea, fever, dysuria. No changes in products, no new partner but is requesting STI screening. Did travel internationally last  "month, but prior to symptoms starting.     Review of Systems   Constitutional, HEENT, cardiovascular, pulmonary, gi and gu systems are negative, except as otherwise noted.      Objective    /76 (BP Location: Right arm, Patient Position: Sitting, Cuff Size: Adult Large)   Pulse 61   Ht 1.727 m (5' 8\")   Wt 116.8 kg (257 lb 9.6 oz)   LMP  (LMP Unknown)   SpO2 95%   BMI 39.17 kg/m    Body mass index is 39.17 kg/m .  Physical Exam   GENERAL: healthy, alert and no distress   (female): normal female external genitalia, normal urethral meatus , vaginal mucosa pink, moist, well rugated, vaginal discharge - moderate and yellow, and normal cervix, adnexae, and uterus without masses.  MS: no gross musculoskeletal defects noted, no edema  SKIN: no suspicious lesions or rashes  PSYCH: mentation appears normal, affect normal/bright    Results for orders placed or performed in visit on 09/19/23 (from the past 24 hour(s))   Wet preparation    Specimen: Vagina; Swab   Result Value Ref Range    Trichomonas Absent Absent    Yeast Present (A) Absent    Clue Cells Present (A) Absent    WBCs/high power field 4+ (A) None         Depression Screening Follow-up        9/19/2023    10:16 AM   PHQ   PHQ-9 Total Score 15   Q9: Thoughts of better off dead/self-harm past 2 weeks Not at all       Does the patient currently have a mental health provider?  No  Follow Up Actions Taken  Mental Health Referral pended/placed per patient request    RYLEY Meza CNP     Answers submitted by the patient for this visit:  Patient Health Questionnaire (Submitted on 9/19/2023)  If you checked off any problems, how difficult have these problems made it for you to do your work, take care of things at home, or get along with other people?: Very difficult  PHQ9 TOTAL SCORE: 15    "

## 2023-09-20 LAB
C TRACH DNA SPEC QL NAA+PROBE: NEGATIVE
HBV SURFACE AG SERPL QL IA: NONREACTIVE
HCV AB SERPL QL IA: NONREACTIVE
N GONORRHOEA DNA SPEC QL NAA+PROBE: NEGATIVE
T PALLIDUM AB SER QL: NONREACTIVE

## 2023-09-21 LAB
BACTERIA UR CULT: ABNORMAL
HIV 1+2 AB+HIV1 P24 AG SERPL QL IA: NONREACTIVE

## 2023-09-21 RX ORDER — NITROFURANTOIN 25; 75 MG/1; MG/1
100 CAPSULE ORAL 2 TIMES DAILY
Qty: 14 CAPSULE | Refills: 0 | Status: SHIPPED | OUTPATIENT
Start: 2023-09-21 | End: 2023-11-21

## 2023-10-08 ENCOUNTER — HEALTH MAINTENANCE LETTER (OUTPATIENT)
Age: 28
End: 2023-10-08

## 2023-11-20 NOTE — PROGRESS NOTES
Assessment & Plan     Amenorrhea  We discussed her history and concerns about fertility. Discussed based on her history and labs from 2022 she is at a minimum likely not ovulating consistently, which is needed to achieve pregnancy. We discussed possibility of PCOS-ultrasound did not show suspicion for this. We discussed need for some amount of bleeding at least Q3 months and discussed rationale for this recommendation, discussed risks of prolonged episodes of amenorrhea, especially given her elevated BMI. Discussed how achieving modest weight loss can also help improve ovulation, lower insulin resistance. Will plan to check labs below.   Before giving Nuva Ring, recommend we try a progesterone challenge-discussed medication use, possible side effects, when to expect a bleed and when to call if no bleeding occurs. If she has a withdrawal bleed, will send prescription for Nuva Ring and patient to use it consistently each month until they are ready to start trying for pregnancy. Very briefly discussed need for ovulation stimulating medications. Patient is given an opportunity to ask questions and have them answered.  - Follicle stimulating hormone; Future  - Luteinizing Hormone; Future  - Prolactin; Future  - Testosterone Free and Total; Future  - Estradiol; Future  - TSH with free T4 reflex; Future  - hCG Quantitative Pregnancy; Future  - progesterone (PROMETRIUM) 200 MG capsule; Take 1 capsule (200 mg) by mouth daily  - Follicle stimulating hormone  - Luteinizing Hormone  - Prolactin  - Testosterone Free and Total  - Estradiol  - TSH with free T4 reflex  - hCG Quantitative Pregnancy    BMI 40.0-44.9, adult (H)  Per above  - Hemoglobin A1c; Future  - Hemoglobin A1c    RYLEY Meza CNP  M [facility] DEVAN Garrett is a 27 year old, presenting for the following health issues:  Amenorrhea      HPI       Amenorrhea    Patient was seen Sept 2022 for a preventive visit and at the  "time, had discussed irregular menstrual cycles. They had been regular each month for 4 days until March 2020. Had an elective AB at that time and then soon after they became irregular. Initially went about 8-9 months with no cycle. In the past, when she has not had a cycle for a few months, would use Nuva Ring x 1 cycle, get a withdrawal bleed and then only use it again for a cycle a few months later.   She was seen about 2 months ago and at the time had said LMP was unknown-had not mentioned it had been so long since her last cycle, but states she is about 1 year out from her cycle. Has not gone this long without using Nuva Ring before. Jan 2022 had a normal TSH, Testosterone and abnormal LH:FSH ratio. Currently denies frequent headaches, vision changes, galactorrhea. Does not think she is pregnant.  They are thinking about pregnancy sometime in 2024. Pelvic ultrasound earlier this year showed a small uterine fibroid, was otherwise normal.   Presents today for a prescription for Nuva Ring as she has not had her cycle in so long.     Review of Systems   Constitutional, HEENT, cardiovascular, pulmonary, gi and gu systems are negative, except as otherwise noted.      Objective    /72 (BP Location: Right arm, Patient Position: Sitting, Cuff Size: Adult Regular)   Pulse 60   Ht 1.727 m (5' 8\")   Wt 120.8 kg (266 lb 6.4 oz)   LMP 11/21/2022 (Approximate)   SpO2 94%   BMI 40.51 kg/m    Body mass index is 40.51 kg/m .  Physical Exam   GENERAL: healthy, alert and no distress  MS: no gross musculoskeletal defects noted, no edema  SKIN: no suspicious lesions or rashes  PSYCH: mentation appears normal, affect normal/bright      "

## 2023-11-21 ENCOUNTER — MYC MEDICAL ADVICE (OUTPATIENT)
Dept: OBGYN | Facility: CLINIC | Age: 28
End: 2023-11-21

## 2023-11-21 ENCOUNTER — OFFICE VISIT (OUTPATIENT)
Dept: OBGYN | Facility: CLINIC | Age: 28
End: 2023-11-21
Payer: COMMERCIAL

## 2023-11-21 VITALS
HEART RATE: 60 BPM | BODY MASS INDEX: 40.38 KG/M2 | DIASTOLIC BLOOD PRESSURE: 72 MMHG | OXYGEN SATURATION: 94 % | SYSTOLIC BLOOD PRESSURE: 124 MMHG | WEIGHT: 266.4 LBS | HEIGHT: 68 IN

## 2023-11-21 DIAGNOSIS — N91.2 AMENORRHEA: Primary | ICD-10-CM

## 2023-11-21 LAB
ESTRADIOL SERPL-MCNC: 38 PG/ML
FSH SERPL IRP2-ACNC: 6 MIU/ML
HBA1C MFR BLD: 6.1 % (ref 0–5.6)
HCG INTACT+B SERPL-ACNC: <1 MIU/ML
LH SERPL-ACNC: 3.7 MIU/ML
PROLACTIN SERPL 3RD IS-MCNC: 5 NG/ML (ref 5–23)
SHBG SERPL-SCNC: 30 NMOL/L (ref 30–135)
TSH SERPL DL<=0.005 MIU/L-ACNC: 0.87 UIU/ML (ref 0.3–4.2)

## 2023-11-21 PROCEDURE — 84146 ASSAY OF PROLACTIN: CPT | Performed by: NURSE PRACTITIONER

## 2023-11-21 PROCEDURE — 83002 ASSAY OF GONADOTROPIN (LH): CPT | Performed by: NURSE PRACTITIONER

## 2023-11-21 PROCEDURE — 83001 ASSAY OF GONADOTROPIN (FSH): CPT | Performed by: NURSE PRACTITIONER

## 2023-11-21 PROCEDURE — 84270 ASSAY OF SEX HORMONE GLOBUL: CPT | Performed by: NURSE PRACTITIONER

## 2023-11-21 PROCEDURE — 84403 ASSAY OF TOTAL TESTOSTERONE: CPT | Performed by: NURSE PRACTITIONER

## 2023-11-21 PROCEDURE — 84443 ASSAY THYROID STIM HORMONE: CPT | Performed by: NURSE PRACTITIONER

## 2023-11-21 PROCEDURE — 83036 HEMOGLOBIN GLYCOSYLATED A1C: CPT | Performed by: NURSE PRACTITIONER

## 2023-11-21 PROCEDURE — 36415 COLL VENOUS BLD VENIPUNCTURE: CPT | Performed by: NURSE PRACTITIONER

## 2023-11-21 PROCEDURE — 99214 OFFICE O/P EST MOD 30 MIN: CPT | Performed by: NURSE PRACTITIONER

## 2023-11-21 PROCEDURE — 82670 ASSAY OF TOTAL ESTRADIOL: CPT | Performed by: NURSE PRACTITIONER

## 2023-11-21 PROCEDURE — 84702 CHORIONIC GONADOTROPIN TEST: CPT | Performed by: NURSE PRACTITIONER

## 2023-11-21 RX ORDER — PROGESTERONE 200 MG/1
200 CAPSULE ORAL DAILY
Qty: 7 CAPSULE | Refills: 0 | Status: SHIPPED | OUTPATIENT
Start: 2023-11-21 | End: 2024-01-17

## 2023-11-21 NOTE — PATIENT INSTRUCTIONS
If you have any questions regarding your visit, Please contact your care team.     [employer] [facility]: [phone]  To Schedule an Appointment 24/7  Call: [phone] HOU[facility] TELEPHONE NUMBER     Mine Sanz- APRN CNP      Pastor Zendejas-Surgery Scheduler  Lissy-Surgery Scheduler         Monday 7:30am-2:00pm    Tuesday 7:30am-4:00pm    Wednesday 7:30am-2:00pm    Thursday 7:30am-11:00am    Friday 7:30am-2:00pm [address] [address]  Phone- [phone]   Fax- [phone]     Imaging Scheduling all locations  [phone]    [facility] Labor and Delivery  [address] [place] [address]   [place] [address] [phone]         Urgent Care locations:  [facility]   Monday-Friday  10 am - 8 pm  Saturday and Sunday   9 am - 5 pm     [phone]   If you need a medication refill, please contact your pharmacy. Please allow 3 business days for your refill to be completed.  As always, Thank you for trusting us with your healthcare needs!      see additional instructions from your care team below

## 2023-11-25 DIAGNOSIS — N91.2 AMENORRHEA: ICD-10-CM

## 2023-11-26 LAB
TESTOST FREE SERPL-MCNC: 0.43 NG/DL
TESTOST SERPL-MCNC: 23 NG/DL (ref 8–60)

## 2023-11-27 ENCOUNTER — MYC MEDICAL ADVICE (OUTPATIENT)
Dept: OBGYN | Facility: CLINIC | Age: 28
End: 2023-11-27
Payer: COMMERCIAL

## 2023-11-27 RX ORDER — PROGESTERONE 200 MG/1
200 CAPSULE ORAL DAILY
OUTPATIENT
Start: 2023-11-27

## 2023-11-27 NOTE — TELEPHONE ENCOUNTER
Spoke with patient to go over lab results-did not request refill, was auto request-refill declined. Mine HEDRICK CNP

## 2023-11-27 NOTE — TELEPHONE ENCOUNTER
"Requested Prescriptions   Pending Prescriptions Disp Refills    progesterone (PROMETRIUM) 200 MG capsule [Pharmacy Med Name: PROGESTERONE 200MG CAPSULES] 7 capsule 0     Sig: TAKE 1 CAPSULE(200 MG) BY MOUTH DAILY       Hormone Replacement Therapy Passed - 11/25/2023  3:56 AM        Passed - Blood pressure under 140/90 in past 12 months     BP Readings from Last 3 Encounters:   11/21/23 124/72   09/19/23 113/76   09/07/22 124/79                 Passed - Recent (12 mo) or future (30 days) visit within the authorizing provider's specialty     Patient has had an office visit with the authorizing provider or a provider within the authorizing providers department within the previous 12 mos or has a future within next 30 days. See \"Patient Info\" tab in inbasket, or \"Choose Columns\" in Meds & Orders section of the refill encounter.              Passed - Medication is active on med list        Passed - Patient is 18 years of age or older        Passed - No active pregnancy on record        Passed - No positive pregnancy test on record in past 12 months           Last seen: 11/21/2023  Last refilled: 11/21/2023    Called patient Unable to reach patient via phone. RN left a message and instructed patient to call the clinic at [phone].  Will send [employer] message as well. Asking if she requested a refill? When med was started? Has she started bleeding?         "

## 2023-11-27 NOTE — TELEPHONE ENCOUNTER
Telephone call to patient to discuss lab results-she did not request refill from pharmacy. Refill denial sent to pharmacy.  Discussed labs and her concerns for the [id number]. Patient is asking for prescription for insulin and discussed this is not indicated at this time. Recommend she follow up with her PCP to discuss weight loss concerns-discussed possible referral to comprehensive weight management center. Discussed that weight loss can help lower A1c and can also help with achieving pregnancy when desired next spring. Also reviewed physical activity, diet.   Still has a few days of Prometrium left, will call if she has withdrawal bleed and will plan 3 month prescription for Nuva Ring. Also to call if no withdrawal bleed within 14 days of last dose of Prometrium. Questions discussed. Mine HEDRICK CNP

## 2023-12-04 ENCOUNTER — MYC REFILL (OUTPATIENT)
Dept: OBGYN | Facility: CLINIC | Age: 28
End: 2023-12-04
Payer: COMMERCIAL

## 2023-12-04 ENCOUNTER — MYC MEDICAL ADVICE (OUTPATIENT)
Dept: OBGYN | Facility: CLINIC | Age: 28
End: 2023-12-04
Payer: COMMERCIAL

## 2023-12-04 DIAGNOSIS — N91.2 AMENORRHEA: ICD-10-CM

## 2023-12-04 RX ORDER — PROGESTERONE 200 MG/1
200 CAPSULE ORAL DAILY
Qty: 7 CAPSULE | Refills: 0 | Status: CANCELLED | OUTPATIENT
Start: 2023-12-04

## 2023-12-05 NOTE — TELEPHONE ENCOUNTER
"Per 11/27 mychart encounter:  \"Still has a few days of Prometrium left, will call if she has withdrawal bleed and will plan 3 month prescription for Nuva Ring. Also to call if no withdrawal bleed within 14 days of last dose of Prometrium\"      "

## 2023-12-05 NOTE — TELEPHONE ENCOUNTER
Pt is only 7 days out from her last dose of Prometrium and hasn't gotten a period.  Mine Sanz, RYLEY [place], advised her to give it 14 days to see if she will get a withdrawal bleed.  Pt will contact us in a week if she still hasn't gotten her period and we will route to Mine for a new plan.    Melina Leon RN

## 2023-12-08 ENCOUNTER — MYC MEDICAL ADVICE (OUTPATIENT)
Dept: OBGYN | Facility: CLINIC | Age: 28
End: 2023-12-08
Payer: COMMERCIAL

## 2024-01-03 ENCOUNTER — OFFICE VISIT (OUTPATIENT)
Dept: URGENT CARE | Facility: URGENT CARE | Age: 29
End: 2024-01-03
Payer: COMMERCIAL

## 2024-01-03 VITALS
TEMPERATURE: 99.3 F | SYSTOLIC BLOOD PRESSURE: 123 MMHG | BODY MASS INDEX: 41.52 KG/M2 | OXYGEN SATURATION: 95 % | HEART RATE: 97 BPM | DIASTOLIC BLOOD PRESSURE: 79 MMHG | WEIGHT: 273.1 LBS

## 2024-01-03 DIAGNOSIS — J02.0 STREP THROAT: Primary | ICD-10-CM

## 2024-01-03 DIAGNOSIS — J02.9 SORE THROAT: ICD-10-CM

## 2024-01-03 LAB — DEPRECATED S PYO AG THROAT QL EIA: NEGATIVE

## 2024-01-03 PROCEDURE — 99213 OFFICE O/P EST LOW 20 MIN: CPT | Mod: 25 | Performed by: PHYSICIAN ASSISTANT

## 2024-01-03 PROCEDURE — 87651 STREP A DNA AMP PROBE: CPT | Performed by: PHYSICIAN ASSISTANT

## 2024-01-03 PROCEDURE — 96372 THER/PROPH/DIAG INJ SC/IM: CPT | Performed by: PHYSICIAN ASSISTANT

## 2024-01-04 LAB — GROUP A STREP BY PCR: NOT DETECTED

## 2024-01-04 NOTE — PROGRESS NOTES
Chief Complaint   Patient presents with    Urgent Care    Pharyngitis     Started yesterday like dry throat, not really sore throat, Exposure to strep throat        ASSESSMENT/PLAN:  Tami was seen today for urgent care and pharyngitis.    Diagnoses and all orders for this visit:    Strep throat  -     penicillin G benzathine (BICILLIN L-A) injection 1.2 Million Units    Sore throat  -     Streptococcus A Rapid Screen w/Reflex to PCR - Clinic Collect  -     Group A Streptococcus PCR Throat Swab    Rapid strep negative however, given that her symptoms started today and the high risk exposure and exam I think this is likely to be truly strep.  Will give her penicillin shot per request.    Kalen Guy PA-C      SUBJECTIVE:  Tami is a 28 year old female who presents to urgent care with 1 day of sore throat, fever, chills and malaise.  2 people in her household have strep.    ROS: Pertinent ROS neg other than the symptoms noted above in the HPI.     OBJECTIVE:  /79 (BP Location: Left arm, Patient Position: Sitting, Cuff Size: Adult Large)   Pulse 97   Temp 99.3  F (37.4  C) (Tympanic)   Wt 123.9 kg (273 lb 1.6 oz)   LMP 11/21/2022 (Approximate)   SpO2 95%   BMI 41.52 kg/m     GENERAL: healthy, alert and no distress  EYES: Eyes grossly normal to inspection, PERRL and conjunctivae and sclerae normal  HENT: Tonsils 2+ bilaterally, erythematous, TMs within normal months bilaterally    DIAGNOSTICS    Results for orders placed or performed in visit on 01/03/24   Streptococcus A Rapid Screen w/Reflex to PCR - Clinic Collect     Status: Normal    Specimen: Throat; Swab   Result Value Ref Range    Group A Strep antigen Negative Negative        Current Outpatient Medications   Medication    etonogestrel-ethinyl estradiol (NUVARING) 0.12-0.015 MG/24HR vaginal ring    progesterone (PROMETRIUM) 200 MG capsule     No current facility-administered medications for this visit.      Patient Active Problem List    Diagnosis    Morbid obesity (H)    Papanicolaou smear of cervix with low grade squamous intraepithelial lesion (LGSIL)    Serosal uterine leiomyoma    Major depressive disorder with single episode, in full remission (H24)      Past Medical History:   Diagnosis Date    Abnormal Pap smear of cervix 07/15/2020    see problem list    Chlamydia 2015    Major depressive disorder with single episode, in full remission (H24) 2021    Morbid obesity (H) 07/15/2020    Serosal uterine leiomyoma 2020     Past Surgical History:   Procedure Laterality Date    NO HISTORY OF SURGERY       Family History   Problem Relation Age of Onset    Crohn's Disease Mother     Hypertension Father     Colitis Sister     Pancreatitis Sister      Social History     Tobacco Use    Smoking status: Former     Types: Cigars    Smokeless tobacco: Never   Substance Use Topics    Alcohol use: Not Currently              The plan of care was discussed with the patient. They understand and agree with the course of treatment prescribed. A printed summary was given including instructions and medications.  The use of Dragon/Apptimate dictation services may have been used to construct the content in this note; any grammatical or spelling errors are non-intentional. Please contact the author of this note directly if you are in need of any clarification.

## 2024-01-10 ENCOUNTER — MYC MEDICAL ADVICE (OUTPATIENT)
Dept: OBGYN | Facility: CLINIC | Age: 29
End: 2024-01-10
Payer: COMMERCIAL

## 2024-01-16 NOTE — PROGRESS NOTES
"  Assessment & Plan     Amenorrhea  We discussed her pregnancy plans and very briefly discussed use of ovulation stimulation medications, length of treatment, referral to RE. Patient will plan a follow up visit towards the end of her Nuva Ring cycle and we will discuss fertility in more detail and plan to start ovulation medication such as Clomid or Letrozol. To start PNV. Patient is given an opportunity to ask questions and have them answered.     Vaginal discharge  Negative wet prep. Discussed physiologic discharge vs infectious, discussed changes that can occur with hormonal medication use. To follow up as needed.  - Wet preparation    23 minutes spent by me on the date of the encounter doing chart review, history and exam, documentation and further activities per the note        Subjective   Tami is a 28 year old, presenting for the following health issues:  Follow Up (Amenorrhea/)    HPI       Follow up Amenorrhea/Vaginitis    Patient presents with 2 concerns. First, has concerns about vaginitis-treated in September for yeast and BV, unsure if she is experiencing a recurrence. Noting increased \"dry discharge\" with no vaginal itching, odor, changes in discharge color. Denies abnormal urinary symptoms, pelvic pain, fever. No STI concerns and last screening was in September. No changes in products. Patient started on Nuva Ring last month due to amenorrhea, just had her first cycle with the ring on 1/10/24-was heavier than she is used to. Second ring currently in place. Patient with a history of amenorrhea and work up has been done and plan is to start ovulation stimulation when they are ready to move forward with pregnancy. Had held off on starting medication this past fall as their goal was to start trying for pregnancy in March. For now, thinks her plan is to finish out this cycle with the ring and then consider ovulation stimulation given her cycle history and work up.    Review of Systems   Constitutional, " "HEENT, cardiovascular, pulmonary, gi and gu systems are negative, except as otherwise noted.      Objective    /79 (BP Location: Right arm, Patient Position: Sitting, Cuff Size: Adult Large)   Pulse 59   Ht 1.727 m (5' 8\")   Wt 122.3 kg (269 lb 9.6 oz)   LMP 01/10/2024 (Exact Date)   SpO2 96%   BMI 40.99 kg/m    Body mass index is 40.99 kg/m .  Physical Exam   GENERAL: alert and no distress   (female): normal female external genitalia, normal urethral meatus, vaginal mucosa, normal cervix/adnexa/uterus without masses or discharge  MS: no gross musculoskeletal defects noted, no edema    Results for orders placed or performed in visit on 01/17/24 (from the past 24 hour(s))   Wet preparation    Specimen: Vagina; Swab   Result Value Ref Range    Trichomonas Absent Absent    Yeast Absent Absent    Clue Cells Absent Absent    WBCs/high power field 1+ (A) None     "

## 2024-01-17 ENCOUNTER — OFFICE VISIT (OUTPATIENT)
Dept: OBGYN | Facility: CLINIC | Age: 29
End: 2024-01-17
Attending: NURSE PRACTITIONER
Payer: COMMERCIAL

## 2024-01-17 VITALS
HEART RATE: 59 BPM | SYSTOLIC BLOOD PRESSURE: 119 MMHG | BODY MASS INDEX: 40.86 KG/M2 | WEIGHT: 269.6 LBS | DIASTOLIC BLOOD PRESSURE: 79 MMHG | HEIGHT: 68 IN | OXYGEN SATURATION: 96 %

## 2024-01-17 DIAGNOSIS — N91.2 AMENORRHEA: Primary | ICD-10-CM

## 2024-01-17 DIAGNOSIS — N89.8 VAGINAL DISCHARGE: ICD-10-CM

## 2024-01-17 LAB
CLUE CELLS: ABNORMAL
TRICHOMONAS, WET PREP: ABNORMAL
WBC'S/HIGH POWER FIELD, WET PREP: ABNORMAL
YEAST, WET PREP: ABNORMAL

## 2024-01-17 PROCEDURE — 99213 OFFICE O/P EST LOW 20 MIN: CPT | Performed by: NURSE PRACTITIONER

## 2024-01-17 PROCEDURE — 87210 SMEAR WET MOUNT SALINE/INK: CPT | Performed by: NURSE PRACTITIONER

## 2024-01-17 NOTE — PATIENT INSTRUCTIONS
If you have any questions regarding your visit, Please contact your care team.     [employer]: [phone]  To Schedule an Appointment 24/7  Call: [phone] HOURS TELEPHONE NUMBER     Mine Sanz- APRN CNP      Pastor Zendejas-Surgery Scheduler  Lissy-Surgery Scheduler         Monday 7:30am-2:00pm    Tuesday 7:30am-4:00pm    Wednesday 7:30am-2:00pm    Thursday 7:30am-11:00am    Friday 7:30am-2:00pm [address] [address]  Phone- [phone]   Fax- [phone]     Imaging Scheduling all locations  [phone]    [facility]  [address] [place] [address]   [place] [address] [phone]         Urgent Care locations:  [facility]   Monday-Friday  10 am - 8 pm  Saturday and Sunday   9 am - 5 pm     [phone]   If you need a medication refill, please contact your pharmacy. Please allow 3 business days for your refill to be completed.  As always, Thank you for trusting us with your healthcare needs!      see additional instructions from your care team below

## 2024-01-30 NOTE — PROGRESS NOTES
Tami is a 28 year old who is being evaluated via a billable telephone visit.      What phone number would you like to be contacted at? [phone]  How would you like to obtain your AVS? Ashli    Distant Location (provider location):  On-site    Assessment & Plan     Female infertility  We reviewed her history and work up completed. Patient expects cycle to begin in a few days. We discussed use of Clomid including the possible side effects such as ovarian cyst formation(reversible), increased incidence of multiple pregnancy and, controversy concerning increasd risk of ovarian cancer later on in life. Patient was also advised about the timing of sexual intercourse in relation to clomid. When she begins her next cycle, start tracking as cycle day 1 and plan Clomid 50mg on days 5-9 daily. Will plan day 21 progesterone level and standing orders entered. If no menses by cycle day 32, will take a home UPT and if negative, will call and I will send prescription for Prometrium to induce bleeding. Discussed sending her to reproductive endocrinologist after 3 rounds on Clomid and she will notify me if she wants a referral sooner. Also, if no pregnancy few months after we have confirmation of ovulation, consider SA. Patient is given an opportunity to ask questions and have them answered.   She did ask if she should insert her next Nuva Ring and was advised this would be counterproductive. Continue PNV.  - Progesterone; Standing  - clomiPHENE (CLOMID) 50 MG tablet; Take 1 tablet (50 mg) by mouth daily On cycle days 5-9    Subjective   Tami is a 28 year old, presenting for the following health issues:  Infertililty    Telephone-Visit Details    Type of service:  Telephone Visit    Phone call Start Time: 10:07AM    Phone call End Time: 10:17AM    Originating Location (pt. Location): Other Work    Distant Location (provider location):  [facility]     HPI     Infertility    Patient requested telephone  visit today to move forward with treatment to help achieve pregnancy. Seen in November with concerns of amenorrhea. Cycles had been regular until March 2020 when she had an elective AB. Initially had about 8-9 months without a cycle. Has used Nuva Ring intermittently since then when she has not had a bleed in several months and does have withdrawal bleeding every month she uses it. When she was seen in November, her LMP was about a year prior. Work up was completed and patient was started on Nuva Ring for a few months as they desired to hold off pregnancy until now and we had felt it would be helpful to have a few regular cycles prior to attempting ovulation induction.   Patient did have a pelvic ultrasound last September that showed a possible small uterine fibroid and small anechoic left ovarian cyst measuring up to 3.0cm.  In November, we had talked about consideration of weight loss, which may help improve her ovulation/cycles and chances of achieving pregnancy, had declined referral to [facility].  Same partner as time of her elective AB. He has low exposure to toxins/chemicals, no chronic conditions.  She removed her Nuva Ring today and is hoping to start Clomid with this upcoming cycle. Is taking a PNV.    Review of Systems  Constitutional, HEENT, cardiovascular, pulmonary, gi and gu systems are negative, except as otherwise noted.      Objective       Vitals:  No vitals were obtained today due to virtual visit.     Physical Exam   General: Alert and no distress //Respiratory: No audible wheeze, cough, or shortness of breath // Psychiatric:  Appropriate affect, tone, and pace of words    Component      Latest Ref Rng 11/21/2023  8:00 AM   Free Testosterone Calculated      ng/dL 0.43    Testosterone Total      8 - 60 ng/dL 23    FSH      mIU/mL 6.0    Luteinizing Hormone      mIU/mL 3.7    Prolactin      5 - 23 ng/mL 5    Estradiol      pg/mL 38    TSH      0.30 - 4.20 uIU/mL 0.87     Sex Hormone Binding Globulin      30 - 135 nmol/L 30      Phone call duration: 10 minutes  Signed Electronically by: RYLEY Meza CNP

## 2024-01-31 ENCOUNTER — VIRTUAL VISIT (OUTPATIENT)
Dept: OBGYN | Facility: CLINIC | Age: 29
End: 2024-01-31
Payer: COMMERCIAL

## 2024-01-31 DIAGNOSIS — N97.9 FEMALE INFERTILITY: Primary | ICD-10-CM

## 2024-01-31 PROCEDURE — 99441 PR PHYSICIAN TELEPHONE EVALUATION 5-10 MIN: CPT | Mod: 93 | Performed by: NURSE PRACTITIONER

## 2024-01-31 RX ORDER — CLOMIPHENE CITRATE 50 MG/1
50 TABLET ORAL DAILY
Qty: 5 TABLET | Refills: 0 | Status: SHIPPED | OUTPATIENT
Start: 2024-01-31 | End: 2024-02-19

## 2024-01-31 NOTE — PATIENT INSTRUCTIONS
Instructions for Ovulation Induction with Clomid    1.  Keep a menstrual diary.  Begin counting each month with the first day of bleeding.    2.  Take the Clomid each day on menstrual days 5-9.    3.  Expect ovulation on or about menstrual day # 14.    4.  You should be having intercourse at least every other day from menstrual day  12 - 16.    5.  Contact the office when you have your cycle, we will schedule a progesterone level at the lab for menstrual day 21, 22, or 23.    6.  If you have not had a cycle by day 32, take a pregnancy test.  If it is negative, call the clinic fr a prescription for Prometrium (200mg daily for 7 days).  Expect a cycle 5-7 days after finishing the perscription.     7.  Contact our office to determine the next dose of Clomid.                                                         If you have any questions regarding your visit, Please contact your care team.     [employer]: [phone]  To Schedule an Appointment 24/7  Call: [phone] HOURS TELEPHONE NUMBER     Mine Powell-KEISHA Summers-KEISHA Zendejas-Surgery Scheduler  Lissy-Surgery Scheduler         Monday 7:30am-2:00pm    Tuesday 7:30am-4:00pm    Wednesday 7:30am-2:00pm    Thursday 7:30am-11:00am    Friday 7:30am-2:00pm [place] [address] [place] [address]  Phone- [phone]   Fax- [phone]     Imaging Scheduling all locations  [phone]    [facility]  [address] [place] [address] [place] [address] [phone]         Urgent Care locations:  [facility]   Monday-Friday  10 am - 8 pm  Saturday and Sunday   9 am - 5 pm     [phone]   If you need a medication refill, please contact your pharmacy. Please allow 3 business days for your refill to be completed.  As always, Thank you for trusting us with your healthcare needs!      see  additional instructions from your care team below

## 2024-02-19 ENCOUNTER — OFFICE VISIT (OUTPATIENT)
Dept: FAMILY MEDICINE | Facility: CLINIC | Age: 29
End: 2024-02-19
Payer: COMMERCIAL

## 2024-02-19 VITALS
RESPIRATION RATE: 20 BRPM | BODY MASS INDEX: 40.28 KG/M2 | HEART RATE: 80 BPM | TEMPERATURE: 98.1 F | HEIGHT: 68 IN | OXYGEN SATURATION: 97 % | DIASTOLIC BLOOD PRESSURE: 80 MMHG | WEIGHT: 265.8 LBS | SYSTOLIC BLOOD PRESSURE: 117 MMHG

## 2024-02-19 DIAGNOSIS — Z11.3 ROUTINE SCREENING FOR STI (SEXUALLY TRANSMITTED INFECTION): Primary | ICD-10-CM

## 2024-02-19 DIAGNOSIS — A74.9 CHLAMYDIA INFECTION: ICD-10-CM

## 2024-02-19 LAB
CLUE CELLS: ABNORMAL
HCG UR QL: NEGATIVE
TRICHOMONAS, WET PREP: ABNORMAL
WBC'S/HIGH POWER FIELD, WET PREP: ABNORMAL
YEAST, WET PREP: ABNORMAL

## 2024-02-19 PROCEDURE — 87591 N.GONORRHOEAE DNA AMP PROB: CPT | Performed by: NURSE PRACTITIONER

## 2024-02-19 PROCEDURE — 87340 HEPATITIS B SURFACE AG IA: CPT | Performed by: NURSE PRACTITIONER

## 2024-02-19 PROCEDURE — 87210 SMEAR WET MOUNT SALINE/INK: CPT | Performed by: NURSE PRACTITIONER

## 2024-02-19 PROCEDURE — 81025 URINE PREGNANCY TEST: CPT | Performed by: NURSE PRACTITIONER

## 2024-02-19 PROCEDURE — 36415 COLL VENOUS BLD VENIPUNCTURE: CPT | Performed by: NURSE PRACTITIONER

## 2024-02-19 PROCEDURE — 87389 HIV-1 AG W/HIV-1&-2 AB AG IA: CPT | Performed by: NURSE PRACTITIONER

## 2024-02-19 PROCEDURE — 99213 OFFICE O/P EST LOW 20 MIN: CPT | Performed by: NURSE PRACTITIONER

## 2024-02-19 PROCEDURE — 87491 CHLMYD TRACH DNA AMP PROBE: CPT | Performed by: NURSE PRACTITIONER

## 2024-02-19 PROCEDURE — 86780 TREPONEMA PALLIDUM: CPT | Performed by: NURSE PRACTITIONER

## 2024-02-19 PROCEDURE — 86803 HEPATITIS C AB TEST: CPT | Performed by: NURSE PRACTITIONER

## 2024-02-19 ASSESSMENT — PATIENT HEALTH QUESTIONNAIRE - PHQ9
SUM OF ALL RESPONSES TO PHQ QUESTIONS 1-9: 12
10. IF YOU CHECKED OFF ANY PROBLEMS, HOW DIFFICULT HAVE THESE PROBLEMS MADE IT FOR YOU TO DO YOUR WORK, TAKE CARE OF THINGS AT HOME, OR GET ALONG WITH OTHER PEOPLE: SOMEWHAT DIFFICULT
SUM OF ALL RESPONSES TO PHQ QUESTIONS 1-9: 12

## 2024-02-19 ASSESSMENT — PAIN SCALES - GENERAL: PAINLEVEL: NO PAIN (0)

## 2024-02-19 ASSESSMENT — ENCOUNTER SYMPTOMS
DYSURIA: 0
ABDOMINAL PAIN: 0
DIFFICULTY URINATING: 0
CHILLS: 0
FEVER: 0
VOMITING: 0
HEMATURIA: 0
FREQUENCY: 0

## 2024-02-20 ENCOUNTER — MYC REFILL (OUTPATIENT)
Dept: FAMILY MEDICINE | Facility: CLINIC | Age: 29
End: 2024-02-20
Payer: COMMERCIAL

## 2024-02-20 DIAGNOSIS — A74.9 CHLAMYDIA INFECTION: ICD-10-CM

## 2024-02-20 LAB
C TRACH DNA SPEC QL NAA+PROBE: POSITIVE
HBV SURFACE AG SERPL QL IA: NONREACTIVE
HCV AB SERPL QL IA: NONREACTIVE
HIV 1+2 AB+HIV1 P24 AG SERPL QL IA: NONREACTIVE
N GONORRHOEA DNA SPEC QL NAA+PROBE: NEGATIVE
T PALLIDUM AB SER QL: NONREACTIVE

## 2024-02-20 RX ORDER — DOXYCYCLINE HYCLATE 100 MG
100 TABLET ORAL 2 TIMES DAILY
Qty: 14 TABLET | Refills: 0 | Status: SHIPPED | OUTPATIENT
Start: 2024-02-20 | End: 2024-02-27

## 2024-02-20 RX ORDER — DOXYCYCLINE HYCLATE 100 MG
100 TABLET ORAL 2 TIMES DAILY
Qty: 14 TABLET | Refills: 0 | OUTPATIENT
Start: 2024-02-20

## 2024-02-21 ENCOUNTER — MYC MEDICAL ADVICE (OUTPATIENT)
Dept: FAMILY MEDICINE | Facility: CLINIC | Age: 29
End: 2024-02-21
Payer: COMMERCIAL

## 2024-02-21 DIAGNOSIS — A74.9 CHLAMYDIA INFECTION: Primary | ICD-10-CM

## 2024-02-21 RX ORDER — AZITHROMYCIN 500 MG/1
1000 TABLET, FILM COATED ORAL DAILY
Qty: 2 TABLET | Refills: 0 | Status: SHIPPED | OUTPATIENT
Start: 2024-02-21 | End: 2024-02-22

## 2024-02-21 NOTE — TELEPHONE ENCOUNTER
Patient unable to tolerate doxycycline treatment, threw up two doses. Will switch to azithromycin.    1. Chlamydia infection  - azithromycin (ZITHROMAX) 500 MG tablet; Take 2 tablets (1,000 mg) by mouth daily for 1 day  Dispense: 2 tablet; Refill: 0    -avoid sexual activity for 7 days after completing treatment.   -retest for gonorrhea and chlamydia in 3 months.

## 2024-02-28 ENCOUNTER — OFFICE VISIT (OUTPATIENT)
Dept: FAMILY MEDICINE | Facility: CLINIC | Age: 29
End: 2024-02-28
Payer: COMMERCIAL

## 2024-02-28 VITALS
TEMPERATURE: 97.4 F | HEART RATE: 72 BPM | SYSTOLIC BLOOD PRESSURE: 120 MMHG | DIASTOLIC BLOOD PRESSURE: 81 MMHG | OXYGEN SATURATION: 99 % | HEIGHT: 68 IN | BODY MASS INDEX: 41.43 KG/M2 | RESPIRATION RATE: 16 BRPM | WEIGHT: 273.4 LBS

## 2024-02-28 DIAGNOSIS — N89.8 VAGINAL DISCHARGE: Primary | ICD-10-CM

## 2024-02-28 LAB
ALBUMIN UR-MCNC: NEGATIVE MG/DL
APPEARANCE UR: CLEAR
BILIRUB UR QL STRIP: NEGATIVE
CLUE CELLS: ABNORMAL
COLOR UR AUTO: YELLOW
GLUCOSE UR STRIP-MCNC: NEGATIVE MG/DL
HCG UR QL: NEGATIVE
HGB UR QL STRIP: NEGATIVE
KETONES UR STRIP-MCNC: NEGATIVE MG/DL
LEUKOCYTE ESTERASE UR QL STRIP: NEGATIVE
NITRATE UR QL: NEGATIVE
PH UR STRIP: 7.5 [PH] (ref 5–7)
SP GR UR STRIP: 1.02 (ref 1–1.03)
TRICHOMONAS, WET PREP: ABNORMAL
UROBILINOGEN UR STRIP-ACNC: 0.2 E.U./DL
WBC'S/HIGH POWER FIELD, WET PREP: ABNORMAL
YEAST, WET PREP: ABNORMAL

## 2024-02-28 PROCEDURE — 87210 SMEAR WET MOUNT SALINE/INK: CPT | Performed by: NURSE PRACTITIONER

## 2024-02-28 PROCEDURE — 81025 URINE PREGNANCY TEST: CPT | Performed by: NURSE PRACTITIONER

## 2024-02-28 PROCEDURE — 81003 URINALYSIS AUTO W/O SCOPE: CPT | Performed by: NURSE PRACTITIONER

## 2024-02-28 PROCEDURE — 87491 CHLMYD TRACH DNA AMP PROBE: CPT | Performed by: NURSE PRACTITIONER

## 2024-02-28 PROCEDURE — 36415 COLL VENOUS BLD VENIPUNCTURE: CPT | Performed by: NURSE PRACTITIONER

## 2024-02-28 PROCEDURE — 87591 N.GONORRHOEAE DNA AMP PROB: CPT | Performed by: NURSE PRACTITIONER

## 2024-02-28 PROCEDURE — 99213 OFFICE O/P EST LOW 20 MIN: CPT | Performed by: NURSE PRACTITIONER

## 2024-02-28 PROCEDURE — 84144 ASSAY OF PROGESTERONE: CPT | Mod: 59 | Performed by: NURSE PRACTITIONER

## 2024-02-28 ASSESSMENT — PAIN SCALES - GENERAL: PAINLEVEL: NO PAIN (0)

## 2024-02-28 NOTE — PROGRESS NOTES
"  Assessment & Plan     Vaginal discharge  -Discussed with patient that chlamydia tests can continue to come back positive for up to 4 weeks after treatment as there may be residual nonviable bacteria. However as she's had re-exposure will retest. Will also screen for pregnancy .  - UA Macroscopic with reflex to Microscopic and Culture  - Neisseria gonorrhoeae PCR  - Chlamydia trachomatis PCR  - Wet preparation  - HCG qualitative urine    Ordering of each unique test        BMI  Estimated body mass index is 41.57 kg/m  as calculated from the following:    Height as of this encounter: 1.727 m (5' 8\").    Weight as of this encounter: 124 kg (273 lb 6.4 oz).         Danyel Garrett is a 28 year old, presenting for the following health issues:  RECHECK        2/28/2024    10:48 AM   Additional Questions   Roomed by Keiko ALVAREZ   Accompanied by self     Patient here asking for recheck on chlamydia infection diagnosed 2/19/24. She was prescribed doxycycline but kept throwing up the medication, so was switched to 1g azithromycin. She took the azithromycin on 2/21/24. She was instructed to avoid sexual activity for one week after completing treatment however she does admit to resuming sexual activity with her partner who is also in the middle of treatment with doxycycline. She is still having some vaginal discharge. She did not use condoms. She has not stared using her Nuvaring yet. Last period 2/7/24                   Objective    /81   Pulse 72   Temp 97.4  F (36.3  C) (Tympanic)   Resp 16   Ht 1.727 m (5' 8\")   Wt 124 kg (273 lb 6.4 oz)   LMP 02/07/2024 (Exact Date)   SpO2 99%   BMI 41.57 kg/m    Body mass index is 41.57 kg/m .  Physical Exam  Constitutional:       Appearance: Normal appearance.   HENT:      Right Ear: External ear normal.      Left Ear: External ear normal.   Eyes:      Pupils: Pupils are equal, round, and reactive to light.   Cardiovascular:      Rate and Rhythm: Normal rate.   Pulmonary: "      Effort: Pulmonary effort is normal.   Skin:     General: Skin is warm and dry.   Neurological:      General: No focal deficit present.      Mental Status: She is alert and oriented to person, place, and time.   Psychiatric:         Mood and Affect: Mood normal.         Behavior: Behavior normal.         Thought Content: Thought content normal.         Judgment: Judgment normal.            Results for orders placed or performed in visit on 02/28/24 (from the past 24 hour(s))   UA Macroscopic with reflex to Microscopic and Culture    Specimen: Urine, Clean Catch   Result Value Ref Range    Color Urine Yellow Colorless, Straw, Light Yellow, Yellow    Appearance Urine Clear Clear    Glucose Urine Negative Negative mg/dL    Bilirubin Urine Negative Negative    Ketones Urine Negative Negative mg/dL    Specific Gravity Urine 1.020 1.003 - 1.035    Blood Urine Negative Negative    pH Urine 7.5 (H) 5.0 - 7.0    Protein Albumin Urine Negative Negative mg/dL    Urobilinogen Urine 0.2 0.2, 1.0 E.U./dL    Nitrite Urine Negative Negative    Leukocyte Esterase Urine Negative Negative    Narrative    Microscopic not indicated   Wet preparation    Specimen: Vagina; Swab   Result Value Ref Range    Trichomonas Absent Absent    Yeast Absent Absent    Clue Cells Absent Absent    WBCs/high power field 1+ (A) None   HCG qualitative urine   Result Value Ref Range    hCG Urine Qualitative Negative Negative           Signed Electronically by: RYLEY Pop CNP

## 2024-02-29 LAB
C TRACH DNA SPEC QL NAA+PROBE: NEGATIVE
N GONORRHOEA DNA SPEC QL NAA+PROBE: NEGATIVE
PROGEST SERPL-MCNC: 0.2 NG/ML

## 2024-03-03 DIAGNOSIS — N91.2 AMENORRHEA: ICD-10-CM

## 2024-03-04 ENCOUNTER — MYC MEDICAL ADVICE (OUTPATIENT)
Dept: OBGYN | Facility: CLINIC | Age: 29
End: 2024-03-04
Payer: COMMERCIAL

## 2024-03-04 RX ORDER — ETONOGESTREL AND ETHINYL ESTRADIOL .12; .015 MG/D; MG/D
RING VAGINAL
OUTPATIENT
Start: 2024-03-04

## 2024-03-04 NOTE — TELEPHONE ENCOUNTER
Requested Prescriptions   Pending Prescriptions Disp Refills    ELURYNG 0.12-0.015 MG/24HR vaginal ring [Pharmacy Med Name: ELURYNG]       Sig: INSERT 1 RING VAGINALLY AND LEAVE IN PLACE FOR 3 CONSECUTIVE WEEKS, THEN REMOVE FOR 1 WEEK. THEN REPEAT WITH A NEW RING       Contraceptives Protocol Passed - 3/3/2024  3:57 AM        Passed - Patient is not a current smoker if age is 35 or older        Passed - Recent (12 mo) or future (30 days) visit within the authorizing provider's specialty     The patient must have completed an in-person or virtual visit within the past 12 months or has a future visit scheduled within the next 90 days with the authorizing provider s specialty.  Urgent care and e-visits do not quality as an office visit for this protocol.          Passed - Medication is active on med list        Passed - No active pregnancy on record        Passed - No positive pregnancy test in past 12 months           Pt was recently seen by RYLEY Ross CNP, for infertility on 1/31/24.  I am wondering if this is just an automatic refill request from pharmacy as I would not think pt is using the Nuvaring at this time.    Sending pt a [employer] message for confirmation.    Melina Leon RN

## 2024-03-04 NOTE — TELEPHONE ENCOUNTER
Pt responded to the [email or web address] message and states it was an automatic refill request.    Declining request.    Melina Leon RN

## 2024-03-15 NOTE — PROGRESS NOTES
Assessment & Plan     Female infertility  We reviewed Clomid instructions again. Discussed that she first needs to have a cycle before we can do another round of Clomid. HCG negative today. Prescription sent for Prometrium x 7 days to induce a cycle. Patient will call once cycle begins and will plan to send in Clomid 100 mg dose. Will need to plan mid luteal progesterone level to assess for ovulation this cycle (standing orders previously entered). Again reviewed timing of intercourse. Patient is given an opportunity to ask questions and have them answered.  - HCG qualitative urine; Future  - HCG qualitative urine  - progesterone (PROMETRIUM) 200 MG capsule       Chlamydia infection  Negative 2 weeks ago, still desires repeat test while she is here today.   - Chlamydia trachomatis PCR; Future  - Chlamydia trachomatis PCR    Danyel Garrett is a 28 year old, presenting for the following health issues:  Infertililty    HPI       Infertility    Presents today for her next Clomid prescription. Patient had her first round of Clomid in February. LMP was 2/7/24. Her mid luteal progesterone level did not show ovulation at 50 mg, so plan was to increase to 100 mg dose this cycle. In the interim, patient was diagnosed with Chlamydia. Treated with Doxycycline, which she did not tolerate and was given Azithromycin. Had mentioned to her provider that she was no longer going to be trying for pregnancy and was going to restart Nuva Ring, but did not. She requested retest for the Chlamydia a week after taking medication as she had been sexually active again and partner was not finished with Doxycycline. That test was negative. Patient requests repeat testing again to day for reassurance. No current vaginal symptoms.  She would like to start her next round of Clomid-has not taken a pregnancy test after cycle day 32.  Is meeting with primary care after visit today to discuss her A1c that was elevated to pre-diabetes range.  "    Review of Systems  Constitutional, neuro, ENT, endocrine, pulmonary, cardiac, gastrointestinal, genitourinary, musculoskeletal, integument and psychiatric systems are negative, except as otherwise noted.      Objective    /79 (BP Location: Right arm, Patient Position: Sitting, Cuff Size: Adult Large)   Pulse 54   Ht 1.727 m (5' 8\")   Wt 123.7 kg (272 lb 12.8 oz)   LMP 02/07/2024 (Exact Date)   SpO2 96%   BMI 41.48 kg/m    Body mass index is 41.48 kg/m .  Physical Exam   GENERAL: alert and no distress  MS: no gross musculoskeletal defects noted, no edema  SKIN: no suspicious lesions or rashes  PSYCH: mentation appears normal, affect normal/bright    Results for orders placed or performed in visit on 03/18/24 (from the past 24 hour(s))   HCG qualitative urine   Result Value Ref Range    hCG Urine Qualitative Negative Negative           Signed Electronically by: RYLEY Meza CNP    "

## 2024-03-18 ENCOUNTER — OFFICE VISIT (OUTPATIENT)
Dept: OBGYN | Facility: CLINIC | Age: 29
End: 2024-03-18
Attending: NURSE PRACTITIONER
Payer: COMMERCIAL

## 2024-03-18 ENCOUNTER — OFFICE VISIT (OUTPATIENT)
Dept: FAMILY MEDICINE | Facility: CLINIC | Age: 29
End: 2024-03-18
Payer: COMMERCIAL

## 2024-03-18 VITALS
OXYGEN SATURATION: 96 % | HEART RATE: 54 BPM | SYSTOLIC BLOOD PRESSURE: 127 MMHG | BODY MASS INDEX: 41.34 KG/M2 | WEIGHT: 272.8 LBS | HEIGHT: 68 IN | DIASTOLIC BLOOD PRESSURE: 79 MMHG

## 2024-03-18 VITALS
TEMPERATURE: 97.4 F | SYSTOLIC BLOOD PRESSURE: 128 MMHG | WEIGHT: 272.4 LBS | BODY MASS INDEX: 41.28 KG/M2 | OXYGEN SATURATION: 95 % | HEART RATE: 80 BPM | DIASTOLIC BLOOD PRESSURE: 76 MMHG | HEIGHT: 68 IN | RESPIRATION RATE: 20 BRPM

## 2024-03-18 DIAGNOSIS — E66.01 CLASS 3 SEVERE OBESITY DUE TO EXCESS CALORIES WITH SERIOUS COMORBIDITY AND BODY MASS INDEX (BMI) OF 40.0 TO 44.9 IN ADULT (H): Primary | ICD-10-CM

## 2024-03-18 DIAGNOSIS — R73.03 PREDIABETES: ICD-10-CM

## 2024-03-18 DIAGNOSIS — A74.9 CHLAMYDIA INFECTION: ICD-10-CM

## 2024-03-18 DIAGNOSIS — E66.813 CLASS 3 SEVERE OBESITY DUE TO EXCESS CALORIES WITH SERIOUS COMORBIDITY AND BODY MASS INDEX (BMI) OF 40.0 TO 44.9 IN ADULT (H): Primary | ICD-10-CM

## 2024-03-18 DIAGNOSIS — N97.9 FEMALE INFERTILITY: Primary | ICD-10-CM

## 2024-03-18 LAB
HBA1C MFR BLD: 6.2 % (ref 0–5.6)
HCG UR QL: NEGATIVE

## 2024-03-18 PROCEDURE — 81025 URINE PREGNANCY TEST: CPT | Performed by: NURSE PRACTITIONER

## 2024-03-18 PROCEDURE — 80053 COMPREHEN METABOLIC PANEL: CPT | Performed by: NURSE PRACTITIONER

## 2024-03-18 PROCEDURE — 99213 OFFICE O/P EST LOW 20 MIN: CPT | Performed by: NURSE PRACTITIONER

## 2024-03-18 PROCEDURE — 83036 HEMOGLOBIN GLYCOSYLATED A1C: CPT | Performed by: NURSE PRACTITIONER

## 2024-03-18 PROCEDURE — 87491 CHLMYD TRACH DNA AMP PROBE: CPT | Performed by: NURSE PRACTITIONER

## 2024-03-18 PROCEDURE — 99214 OFFICE O/P EST MOD 30 MIN: CPT | Performed by: NURSE PRACTITIONER

## 2024-03-18 PROCEDURE — 36415 COLL VENOUS BLD VENIPUNCTURE: CPT | Performed by: NURSE PRACTITIONER

## 2024-03-18 PROCEDURE — 80061 LIPID PANEL: CPT | Performed by: NURSE PRACTITIONER

## 2024-03-18 RX ORDER — PROGESTERONE 200 MG/1
200 CAPSULE ORAL DAILY
Qty: 7 CAPSULE | Refills: 0 | Status: SHIPPED | OUTPATIENT
Start: 2024-03-18 | End: 2024-05-03

## 2024-03-18 RX ORDER — METFORMIN HCL 500 MG
TABLET, EXTENDED RELEASE 24 HR ORAL
Qty: 77 TABLET | Refills: 0 | Status: SHIPPED | OUTPATIENT
Start: 2024-03-18 | End: 2024-04-29

## 2024-03-18 ASSESSMENT — PATIENT HEALTH QUESTIONNAIRE - PHQ9
SUM OF ALL RESPONSES TO PHQ QUESTIONS 1-9: 3
10. IF YOU CHECKED OFF ANY PROBLEMS, HOW DIFFICULT HAVE THESE PROBLEMS MADE IT FOR YOU TO DO YOUR WORK, TAKE CARE OF THINGS AT HOME, OR GET ALONG WITH OTHER PEOPLE: SOMEWHAT DIFFICULT
SUM OF ALL RESPONSES TO PHQ QUESTIONS 1-9: 3

## 2024-03-18 ASSESSMENT — PAIN SCALES - GENERAL: PAINLEVEL: NO PAIN (0)

## 2024-03-18 NOTE — PATIENT INSTRUCTIONS
If you have any questions regarding your visit, Please contact your care team.     [employer]: [phone]  To Schedule an Appointment 24/7  Call: [phone] HOURS TELEPHONE NUMBER     Mine Sanz- APRN CNP      Pastor Zendejas-Surgery Scheduler  Lissy-Surgery Scheduler         Monday 7:30am-2:00pm    Tuesday 7:30am-4:00pm    Wednesday 7:30am-2:00pm    Thursday 7:30am-11:00am    Friday 7:30am-2:00pm [address] [address]  Phone- [phone]   Fax- [phone]     Imaging Scheduling all locations  [phone]    [facility]  [address] [place] [address]   [place] [address] [phone]         Urgent Care locations:  [facility]   Monday-Friday  10 am - 8 pm  Saturday and Sunday   9 am - 5 pm     [phone]   If you need a medication refill, please contact your pharmacy. Please allow 3 business days for your refill to be completed.  As always, Thank you for trusting us with your healthcare needs!      see additional instructions from your care team below

## 2024-03-18 NOTE — PROGRESS NOTES
"  Assessment & Plan     Class 3 severe obesity due to excess calories with serious comorbidity and body mass index (BMI) of 40.0 to 44.9 in adult (H), Prediabetes  -Discussed diet and exercise guidelines.  -Discussed that if GLP-1 medications aren't covered by insurance, I would next recommend metformin to treat her prediabetes. Discussed that weight loss and upset stomach are possible side effect of metformin.   -Negative pregnancy test today  - Lipid panel reflex to direct LDL Non-fasting; Future  - Hemoglobin A1c; Future  - Comprehensive metabolic panel; Future  - Nutrition Referral; Future  -Follow up in one month for weight check and tolerance to medications.   - metFORMIN (GLUCOPHAGE XR) 500 MG 24 hr tablet; Take 1 tablet (500 mg) by mouth daily (with dinner) for 7 days, THEN 1 tablet (500 mg) 2 times daily (with meals) for 7 days, THEN 2 tablets (1,000 mg) 2 times daily (with meals) for 14 days.  Dispense: 77 tablet; Refill: 0        Ordering of each unique test  Prescription drug management        BMI  Estimated body mass index is 41.42 kg/m  as calculated from the following:    Height as of this encounter: 1.727 m (5' 8\").    Weight as of this encounter: 123.6 kg (272 lb 6.4 oz).   Weight management plan: Discussed healthy diet and exercise guidelines referral to RD       FUTURE APPOINTMENTS:       - Follow-up visit in one month  Work on weight loss  Regular exercise    Danyel Garrett is a 28 year old, presenting for the following health issues:  Recheck Medication        3/18/2024    11:45 AM   Additional Questions   Roomed by Keiko PAVON   Accompanied by self     Patient here with concerns about weight. Has tried seeing weight specialists at [facility] in the past. She says one of her friends was prescribed a medication she injects once a week to lose weight because her A1C is high, she is wondering if she would be eligible to receive a prescription for this medication as well.     Exercise: Walking, almost " "daily for 40 min-1 hour.     Diet: woke up at 9 am Sunday, usually skips breakfast. Usually eats one big meal at 2 o'clock. Had steak and mashed potatoes and broccoli with cheese. Had an orange and grapes for snack. States she drinks mostly water, is trying to cut down juice. Drinking canned Brisk tea- 2 cans.         History of Present Illness       Reason for visit:  A1C WEIGHT    She eats 0-1 servings of fruits and vegetables daily.She consumes 2 sweetened beverage(s) daily.She exercises with enough effort to increase her heart rate 20 to 29 minutes per day.  She exercises with enough effort to increase her heart rate 4 days per week.   She is taking medications regularly.             Objective    /76   Pulse 80   Temp 97.4  F (36.3  C) (Tympanic)   Resp 20   Ht 1.727 m (5' 8\")   Wt 123.6 kg (272 lb 6.4 oz)   LMP 02/07/2024 (Exact Date)   SpO2 95%   BMI 41.42 kg/m    Body mass index is 41.42 kg/m .  Physical Exam  Constitutional:       General: She is not in acute distress.     Appearance: Normal appearance. She is obese.   HENT:      Right Ear: External ear normal.      Left Ear: External ear normal.   Eyes:      Pupils: Pupils are equal, round, and reactive to light.   Cardiovascular:      Rate and Rhythm: Normal rate.      Pulses: Normal pulses.      Heart sounds: Normal heart sounds. No murmur heard.     No friction rub. No gallop.   Pulmonary:      Effort: Pulmonary effort is normal. No respiratory distress.      Breath sounds: No wheezing, rhonchi or rales.   Skin:     General: Skin is warm and dry.   Neurological:      General: No focal deficit present.      Mental Status: She is alert and oriented to person, place, and time.   Psychiatric:         Mood and Affect: Mood normal.         Behavior: Behavior normal.         Thought Content: Thought content normal.         Judgment: Judgment normal.          Results for orders placed or performed in visit on 03/18/24 (from the past 24 hour(s)) "   Hemoglobin A1c   Result Value Ref Range    Hemoglobin A1C 6.2 (H) 0.0 - 5.6 %           Signed Electronically by: RYLEY Pop CNP

## 2024-03-19 ENCOUNTER — E-VISIT (OUTPATIENT)
Dept: FAMILY MEDICINE | Facility: CLINIC | Age: 29
End: 2024-03-19
Payer: COMMERCIAL

## 2024-03-19 DIAGNOSIS — E66.01 CLASS 3 SEVERE OBESITY DUE TO EXCESS CALORIES WITH SERIOUS COMORBIDITY AND BODY MASS INDEX (BMI) OF 40.0 TO 44.9 IN ADULT (H): Primary | ICD-10-CM

## 2024-03-19 DIAGNOSIS — E66.813 CLASS 3 SEVERE OBESITY DUE TO EXCESS CALORIES WITH SERIOUS COMORBIDITY AND BODY MASS INDEX (BMI) OF 40.0 TO 44.9 IN ADULT (H): Primary | ICD-10-CM

## 2024-03-19 DIAGNOSIS — N89.8 VAGINAL DISCHARGE: Primary | ICD-10-CM

## 2024-03-19 DIAGNOSIS — R73.03 PREDIABETES: ICD-10-CM

## 2024-03-19 LAB
ALBUMIN SERPL BCG-MCNC: 4.3 G/DL (ref 3.5–5.2)
ALP SERPL-CCNC: 81 U/L (ref 40–150)
ALT SERPL W P-5'-P-CCNC: 38 U/L (ref 0–50)
ANION GAP SERPL CALCULATED.3IONS-SCNC: 13 MMOL/L (ref 7–15)
AST SERPL W P-5'-P-CCNC: 28 U/L (ref 0–45)
BILIRUB SERPL-MCNC: 0.3 MG/DL
BUN SERPL-MCNC: 12.9 MG/DL (ref 6–20)
C TRACH DNA SPEC QL NAA+PROBE: NEGATIVE
CALCIUM SERPL-MCNC: 9.6 MG/DL (ref 8.6–10)
CHLORIDE SERPL-SCNC: 102 MMOL/L (ref 98–107)
CHOLEST SERPL-MCNC: 159 MG/DL
CREAT SERPL-MCNC: 0.66 MG/DL (ref 0.51–0.95)
DEPRECATED HCO3 PLAS-SCNC: 23 MMOL/L (ref 22–29)
EGFRCR SERPLBLD CKD-EPI 2021: >90 ML/MIN/1.73M2
FASTING STATUS PATIENT QL REPORTED: NO
GLUCOSE SERPL-MCNC: 122 MG/DL (ref 70–99)
HDLC SERPL-MCNC: 37 MG/DL
LDLC SERPL CALC-MCNC: 64 MG/DL
NONHDLC SERPL-MCNC: 122 MG/DL
POTASSIUM SERPL-SCNC: 4.3 MMOL/L (ref 3.4–5.3)
PROT SERPL-MCNC: 7.2 G/DL (ref 6.4–8.3)
SODIUM SERPL-SCNC: 138 MMOL/L (ref 135–145)
TRIGL SERPL-MCNC: 289 MG/DL

## 2024-03-19 PROCEDURE — 99423 OL DIG E/M SVC 21+ MIN: CPT | Performed by: NURSE PRACTITIONER

## 2024-03-19 NOTE — PROGRESS NOTES
1. Class 3 severe obesity due to excess calories with serious comorbidity and body mass index (BMI) of 40.0 to 44.9 in adult (H)  -Patient reports she has been unable to lose weight through diet and exercise. Comorbidities include prediabetes, A1C has increased to 6.2.  - liraglutide - Weight Management (SAXENDA) 18 MG/3ML pen; Inject 0.6 mg Subcutaneous daily for 7 days, THEN 1.2 mg daily for 7 days, THEN 1.8 mg daily for 7 days, THEN 2.4 mg daily for 7 days, THEN 3 mg daily for 62 days.  Dispense: 38 mL; Refill: 0    2. Prediabetes  -started on metformin as well  - liraglutide - Weight Management (SAXENDA) 18 MG/3ML pen; Inject 0.6 mg Subcutaneous daily for 7 days, THEN 1.2 mg daily for 7 days, THEN 1.8 mg daily for 7 days, THEN 2.4 mg daily for 7 days, THEN 3 mg daily for 62 days.  Dispense: 38 mL; Refill: 0

## 2024-03-20 ENCOUNTER — TELEPHONE (OUTPATIENT)
Dept: FAMILY MEDICINE | Facility: CLINIC | Age: 29
End: 2024-03-20
Payer: COMMERCIAL

## 2024-03-20 NOTE — TELEPHONE ENCOUNTER
PRIOR AUTHORIZATION DENIED    Medication: LIRAGLUTIDE -WEIGHT MANAGEMENT 18 MG/3ML SC SOPN  Insurance Company: [employer] - Phone [phone] Fax [phone]  Denial Date: 3/20/2024  Denial Reason(s): From the records that we have received, Saxenda was denied for these reasons:  1) Records did not show you have tried another drug, called phentermine, together with lifestyle modifications for at least three months.  Since the criteria have not been met, we are not able to approve. Please look at our list of covered drugs, also known as the formulary, to see what is covered. Prior authorization and quantity limits may apply to covered drugs.     Appeal Information:     Patient Notified: No

## 2024-03-20 NOTE — PATIENT INSTRUCTIONS

## 2024-03-21 ENCOUNTER — LAB (OUTPATIENT)
Dept: LAB | Facility: CLINIC | Age: 29
End: 2024-03-21
Payer: COMMERCIAL

## 2024-03-21 DIAGNOSIS — N89.8 VAGINAL DISCHARGE: ICD-10-CM

## 2024-03-21 LAB
CLUE CELLS: PRESENT
HCG UR QL: NEGATIVE
TRICHOMONAS, WET PREP: ABNORMAL
WBC'S/HIGH POWER FIELD, WET PREP: ABNORMAL
YEAST, WET PREP: PRESENT

## 2024-03-21 PROCEDURE — 81025 URINE PREGNANCY TEST: CPT

## 2024-03-21 PROCEDURE — 87491 CHLMYD TRACH DNA AMP PROBE: CPT

## 2024-03-21 PROCEDURE — 87591 N.GONORRHOEAE DNA AMP PROB: CPT

## 2024-03-21 PROCEDURE — 87210 SMEAR WET MOUNT SALINE/INK: CPT

## 2024-03-22 LAB
C TRACH DNA SPEC QL NAA+PROBE: NEGATIVE
N GONORRHOEA DNA SPEC QL NAA+PROBE: NEGATIVE

## 2024-03-28 ENCOUNTER — TELEPHONE (OUTPATIENT)
Dept: FAMILY MEDICINE | Facility: CLINIC | Age: 29
End: 2024-03-28
Payer: COMMERCIAL

## 2024-03-28 ENCOUNTER — MYC MEDICAL ADVICE (OUTPATIENT)
Dept: OBGYN | Facility: CLINIC | Age: 29
End: 2024-03-28
Payer: COMMERCIAL

## 2024-03-28 NOTE — TELEPHONE ENCOUNTER
Prior Authorization Retail Medication Request    Medication/Dose:   Diagnosis and ICD code (if different than what is on RX):     iraglutide - Weight Management (SAXENDA) 18 MG/3ML pen  New/renewal/insurance change PA/secondary ins. PA:  Previously Tried and Failed:  Rationale:      Insurance   Primary: yadi velasco  Insurance ID:  [id number]      Pharmacy Information (if different than what is on RX)  Name:  [facility] pharmacy  Phone:  [phone]  Fax: [phone]

## 2024-03-30 ENCOUNTER — MYC MEDICAL ADVICE (OUTPATIENT)
Dept: OBGYN | Facility: CLINIC | Age: 29
End: 2024-03-30
Payer: COMMERCIAL

## 2024-03-30 DIAGNOSIS — N97.9 FEMALE INFERTILITY: Primary | ICD-10-CM

## 2024-04-01 NOTE — TELEPHONE ENCOUNTER
"Per 3/18/24 OV plan:  \"Patient will call once cycle begins and will plan to send in Clomid 100 mg dose. Will need to plan mid luteal progesterone level to assess for ovulation this cycle (standing orders previously entered)\"    Pt started spotting on 3/29.  She did have some very light bleeding but is not back to spotting again.    Pt is asking for an rx of Clomid.    Pended pt's desired pharmacy and routing to [facility] to approve Clomid if appropriate.    Melina Leon RN            "

## 2024-04-02 RX ORDER — CLOMIPHENE CITRATE 50 MG/1
100 TABLET ORAL DAILY
Qty: 10 TABLET | Refills: 0 | Status: SHIPPED | OUTPATIENT
Start: 2024-04-02 | End: 2024-05-03

## 2024-04-02 NOTE — TELEPHONE ENCOUNTER
Reviewing her chart-looks like she is working with primary care for weight loss. Metformin is ok to continue, appears Wegovy was denied by insurance and they were going to try phentermine, but I do not see it was prescribed. Are we able to confirm with patient if she is on anything other than Metformin? If so, would need to decide if she wants to continue weight loss journey or try for pregnancy. Would not recommend Clomid if she is planning to continue Wegovy or Phentermine. Mine HEDRICK CNP

## 2024-04-02 NOTE — TELEPHONE ENCOUNTER
Pt is not taking Metformin at this time stating it makes her sick so she stopped taking it.  She is not taking any other medication for weight loss.    Routing to RYLEY Ross CNP.    Melina Leon RN

## 2024-04-08 ENCOUNTER — E-VISIT (OUTPATIENT)
Dept: FAMILY MEDICINE | Facility: CLINIC | Age: 29
End: 2024-04-08
Payer: COMMERCIAL

## 2024-04-08 ENCOUNTER — LAB (OUTPATIENT)
Dept: LAB | Facility: CLINIC | Age: 29
End: 2024-04-08
Payer: COMMERCIAL

## 2024-04-08 ENCOUNTER — DOCUMENTATION ONLY (OUTPATIENT)
Dept: FAMILY MEDICINE | Facility: CLINIC | Age: 29
End: 2024-04-08

## 2024-04-08 DIAGNOSIS — B37.31 YEAST INFECTION OF THE VAGINA: ICD-10-CM

## 2024-04-08 DIAGNOSIS — N89.8 VAGINAL DISCHARGE: ICD-10-CM

## 2024-04-08 DIAGNOSIS — N89.8 VAGINAL DISCHARGE: Primary | ICD-10-CM

## 2024-04-08 LAB
CLUE CELLS: ABNORMAL
HCG UR QL: NEGATIVE
TRICHOMONAS, WET PREP: ABNORMAL
WBC'S/HIGH POWER FIELD, WET PREP: ABNORMAL
YEAST, WET PREP: PRESENT

## 2024-04-08 PROCEDURE — 87210 SMEAR WET MOUNT SALINE/INK: CPT

## 2024-04-08 PROCEDURE — 81025 URINE PREGNANCY TEST: CPT

## 2024-04-08 PROCEDURE — 87491 CHLMYD TRACH DNA AMP PROBE: CPT

## 2024-04-08 PROCEDURE — 87591 N.GONORRHOEAE DNA AMP PROB: CPT

## 2024-04-08 PROCEDURE — 99423 OL DIG E/M SVC 21+ MIN: CPT | Performed by: NURSE PRACTITIONER

## 2024-04-08 RX ORDER — FLUCONAZOLE 150 MG/1
150 TABLET ORAL ONCE
Qty: 2 TABLET | Refills: 0 | Status: SHIPPED | OUTPATIENT
Start: 2024-04-08 | End: 2024-04-08 | Stop reason: ALTCHOICE

## 2024-04-08 RX ORDER — FLUCONAZOLE 150 MG/1
150 TABLET ORAL
Qty: 3 TABLET | Refills: 0 | Status: SHIPPED | OUTPATIENT
Start: 2024-04-08 | End: 2024-04-15

## 2024-04-08 NOTE — PROGRESS NOTES
Please place orders for a requested WET test at a lab appt scheduled 4/8/24 or contact pt with further info.

## 2024-04-08 NOTE — PROGRESS NOTES
Patient notified via Axcient message to create and submit E-visit for Wet Prep order and testing.      Jazz Shankar RN  Clinical Triage/Primary Care  [facility]

## 2024-04-08 NOTE — PATIENT INSTRUCTIONS
Thank you for choosing us for your care. Given your symptoms, I would like you to do a lab-only visit to determine what is causing them.  I have placed the orders.  Please schedule an appointment with the lab right here in [facility], or call [phone].  I will let you know when the results are back and next steps to take.  Vaginitis: Care Instructions  Overview     Vaginitis is soreness or infection of the vagina. This common problem can cause itching and burning. And it can cause a change in vaginal discharge. Sometimes it can cause pain during sex. Vaginitis may be caused by bacteria, yeast, or other germs. Some infections that cause it are caught from a sexual partner. Bath products, spermicides, and douches can irritate the vagina too.  This problem can also happen during and after menopause. A drop in estrogen levels during this time can cause dryness, soreness, and pain during sex.  Your doctor can give you medicine to treat vaginitis. And home care may help you feel better. For certain types of infections, your sex partner(s) must be treated too.  Follow-up care is a key part of your treatment and safety. Be sure to make and go to all appointments, and call your doctor if you are having problems. It's also a good idea to know your test results and keep a list of the medicines you take.  How can you care for yourself at home?  Take your medicines exactly as prescribed. Call your doctor if you think you are having a problem with your medicine.  Ask your doctor if your sex partner(s) also needs treatment.  Do not eat or drink anything that has alcohol if you are taking metronidazole (Flagyl).  Wash your vulva daily with water. You also can use a mild, unscented soap if you want.  Do not use scented bath products. And do not use vaginal sprays or douches.  Change out of wet or damp clothes as soon as possible. Wear cotton underwear. And avoid tight clothing that could increase moisture.  Put a washcloth soaked  "in cool water on the area to relieve itching. Or you can take cool baths.  If you have dryness because of menopause, use estrogen cream or pills that your doctor prescribes.  Ask your doctor about when it is okay to have sex.  Use a personal lubricant before sex if you have dryness. Examples are Astroglide, K-Y Jelly, and Wet Lubricant Gel.  When should you call for help?   Call your doctor now or seek immediate medical care if:    You have a fever.     You have new or increased pain in your vagina or pelvis.     You have new or worse vaginal itching or discharge.   Watch closely for changes in your health, and be sure to contact your doctor if:    You have bleeding other than your period.     You do not get better as expected.   Where can you learn more?  Go to https://www.Dtime.net/patiented  Enter F219 in the search box to learn more about \"Vaginitis: Care Instructions.\"  Current as of: November 27, 2023               Content Version: 14.0    6595-9069 [employer].   Care instructions adapted under license by your healthcare professional. If you have questions about a medical condition or this instruction, always ask your healthcare professional. [employer] disclaims any warranty or liability for your use of this information.      "

## 2024-04-09 LAB
C TRACH DNA SPEC QL NAA+PROBE: NEGATIVE
N GONORRHOEA DNA SPEC QL NAA+PROBE: NEGATIVE

## 2024-04-10 NOTE — TELEPHONE ENCOUNTER
Retail Pharmacy Prior Authorization Team   Phone: [phone]    Prior Authorization Not Needed per Insurance    Medication: SAXENDA 18 MG/3ML SC SOPN  Insurance Company:  NO ACTIVE INS ON FILE  [id number] [place] [facility] [address]  Pharmacy Notified: YES, SPOKE WITH PHARMACY STAFF, NO ACTIVE INS ON FILE. CHECKED [place], AND [place]IND[place]. NOTHING ACTIVE.   I AM CLOSING THIS ENCOUNTER. PHARMACY KNOWS TO RESUBMIT A REQUEST IF NEEDED IN THE FUTURE.     Thank you,  Nicole Araujo, Jim  [facility] PA Team

## 2024-04-18 ENCOUNTER — LAB (OUTPATIENT)
Dept: LAB | Facility: CLINIC | Age: 29
End: 2024-04-18
Payer: COMMERCIAL

## 2024-04-18 PROCEDURE — 36415 COLL VENOUS BLD VENIPUNCTURE: CPT

## 2024-04-18 PROCEDURE — 84144 ASSAY OF PROGESTERONE: CPT

## 2024-04-19 LAB — PROGEST SERPL-MCNC: <0.1 NG/ML

## 2024-04-26 ENCOUNTER — TELEPHONE (OUTPATIENT)
Dept: FAMILY MEDICINE | Facility: CLINIC | Age: 29
End: 2024-04-26
Payer: COMMERCIAL

## 2024-04-26 ENCOUNTER — MYC REFILL (OUTPATIENT)
Dept: FAMILY MEDICINE | Facility: CLINIC | Age: 29
End: 2024-04-26
Payer: COMMERCIAL

## 2024-04-26 DIAGNOSIS — R73.03 PREDIABETES: ICD-10-CM

## 2024-04-26 DIAGNOSIS — E66.813 CLASS 3 SEVERE OBESITY DUE TO EXCESS CALORIES WITH SERIOUS COMORBIDITY AND BODY MASS INDEX (BMI) OF 40.0 TO 44.9 IN ADULT (H): ICD-10-CM

## 2024-04-26 DIAGNOSIS — E66.01 CLASS 3 SEVERE OBESITY DUE TO EXCESS CALORIES WITH SERIOUS COMORBIDITY AND BODY MASS INDEX (BMI) OF 40.0 TO 44.9 IN ADULT (H): ICD-10-CM

## 2024-04-26 NOTE — TELEPHONE ENCOUNTER
Fax message:   liraglutide - Weight Management (SAXENDA) 18 MG/3ML pen     Not available. Please send alternative.

## 2024-04-29 ENCOUNTER — MYC MEDICAL ADVICE (OUTPATIENT)
Dept: FAMILY MEDICINE | Facility: CLINIC | Age: 29
End: 2024-04-29
Payer: COMMERCIAL

## 2024-04-29 ENCOUNTER — OFFICE VISIT (OUTPATIENT)
Dept: URGENT CARE | Facility: URGENT CARE | Age: 29
End: 2024-04-29
Payer: COMMERCIAL

## 2024-04-29 VITALS
HEART RATE: 107 BPM | BODY MASS INDEX: 41.66 KG/M2 | SYSTOLIC BLOOD PRESSURE: 123 MMHG | DIASTOLIC BLOOD PRESSURE: 81 MMHG | TEMPERATURE: 99.4 F | WEIGHT: 274 LBS | OXYGEN SATURATION: 95 % | RESPIRATION RATE: 16 BRPM

## 2024-04-29 DIAGNOSIS — J02.0 STREP THROAT: Primary | ICD-10-CM

## 2024-04-29 DIAGNOSIS — R07.0 THROAT PAIN: ICD-10-CM

## 2024-04-29 LAB — DEPRECATED S PYO AG THROAT QL EIA: POSITIVE

## 2024-04-29 PROCEDURE — 87880 STREP A ASSAY W/OPTIC: CPT | Performed by: STUDENT IN AN ORGANIZED HEALTH CARE EDUCATION/TRAINING PROGRAM

## 2024-04-29 PROCEDURE — 99213 OFFICE O/P EST LOW 20 MIN: CPT | Performed by: STUDENT IN AN ORGANIZED HEALTH CARE EDUCATION/TRAINING PROGRAM

## 2024-04-29 RX ORDER — AZITHROMYCIN 200 MG/5ML
500 POWDER, FOR SUSPENSION ORAL DAILY
Qty: 62.5 ML | Refills: 0 | Status: SHIPPED | OUTPATIENT
Start: 2024-04-29 | End: 2024-05-04

## 2024-04-29 RX ORDER — METFORMIN HCL 500 MG
1000 TABLET, EXTENDED RELEASE 24 HR ORAL 2 TIMES DAILY WITH MEALS
Qty: 360 TABLET | Refills: 0 | Status: SHIPPED | OUTPATIENT
Start: 2024-04-29 | End: 2024-06-14

## 2024-04-29 NOTE — TELEPHONE ENCOUNTER
Declined to refill, patient is currently on Clomid/fertility medication, so liraglutide is not indicated. Okay to take metformin as this may help with ovulation.

## 2024-04-29 NOTE — PROGRESS NOTES
ASSESSMENT & PLAN:   Diagnoses and all orders for this visit:  Strep throat  -     azithromycin (ZITHROMAX) 200 MG/5ML suspension; Take 12.5 mLs (500 mg) by mouth daily for 5 days  Throat pain  -     Streptococcus A Rapid Screen w/Reflex to PCR - Clinic Collect    Sore throat x 1 day. Rapid strep test positive. Azithromycin x 5 days per patient request of shorter duration of treatment. Also requesting liquid medication. Symptomatic treatments discussed.    At the end of the encounter, I discussed results, diagnosis, medications. Discussed red flags for immediate return to clinic/ER, as well as indications for follow up if no improvement. Patient and/or caregiver understood and agreed to plan. Patient was stable for discharge.    Patient Instructions   Your strep test was positive.  You will need to be on antibiotic treatment for 12 hours before returning to school/work.  Change your toothbrush in 3 days to prevent reinfection.  For your sore throat, you may try tylenol/ibuprofen, salt water gargles, throat lozenges, using humidifier, warm beverages.  Make sure to drink plenty of fluids and wash your hands often.  Follow-up with your PCP if you have continued pain in 3-5 days.     ------------------------------------------------------------------------  SUBJECTIVE  History was obtained from patient.    Patient presents with:  Headache  Pharyngitis: Neck swollen    HPI  Tami Almaraz is a(n) 28 year old female presenting to urgent care for sore throat that began yesterday. Also has headache and fever. No cough, congestion, rhinorrhea, nausea, vomiting, diarrhea. No known sick contacts.    Review of Systems    Current Outpatient Medications   Medication Sig Dispense Refill    azithromycin (ZITHROMAX) 200 MG/5ML suspension Take 12.5 mLs (500 mg) by mouth daily for 5 days 62.5 mL 0    clomiPHENE (CLOMID) 50 MG tablet Take 2 tablets (100 mg) by mouth daily On cycle days 5-9 10 tablet 0    metFORMIN (GLUCOPHAGE XR) 500 MG  24 hr tablet Take 2 tablets (1,000 mg) by mouth 2 times daily (with meals) for 90 days 360 tablet 0    progesterone (PROMETRIUM) 200 MG capsule Take 1 capsule (200 mg) by mouth daily 7 capsule 0    progesterone (PROMETRIUM) 200 MG capsule Take 1 capsule (200 mg) by mouth daily 7 capsule 0     Problem List:  2021: Major depressive disorder with single episode, in full   remission (H24)  2020-07: Papanicolaou smear of cervix with low grade squamous   intraepithelial lesion (LGSIL)  2020-07: Morbid obesity (H)  2019-07: Abdominal pain  Serosal uterine leiomyoma    No Known Allergies      OBJECTIVE  Vitals:    04/29/24 1648   BP: 123/81   Pulse: 107   Resp: 16   Temp: 99.4  F (37.4  C)   SpO2: 95%   Weight: 124.3 kg (274 lb)     Physical Exam   GENERAL: healthy, alert, no acute distress.   PSYCH: mentation appears normal. Normal affect  HEAD: normocephalic, atraumatic.  EYE: PERRL. EOMs intact. No scleral injection bilaterally.   EAR: external ear normal. Bilateral ear canals normal and nonpainful. Bilateral TM intact, pearly, translucent without bulging.  NOSE: external nose atraumatic without lesions.  OROPHARYNX: moist mucous membranes. Tonsils 2+, erythematous with erythematous macules on soft palate. No exudate. Uvula midline. Patent airway.  LUNGS: no increased work of breathing. Clear lung sounds bilaterally. No wheezing, rhonchi, or rales.   CV: regular rate and rhythm. No clicks, murmurs, or rubs.    Results for orders placed or performed in visit on 04/29/24   Streptococcus A Rapid Screen w/Reflex to PCR - Clinic Collect     Status: Abnormal    Specimen: Throat; Swab   Result Value Ref Range    Group A Strep antigen Positive (A) Negative

## 2024-04-30 ENCOUNTER — MYC MEDICAL ADVICE (OUTPATIENT)
Dept: FAMILY MEDICINE | Facility: CLINIC | Age: 29
End: 2024-04-30
Payer: COMMERCIAL

## 2024-04-30 NOTE — TELEPHONE ENCOUNTER
Routing to UC since patient was seen by UC and not Radha Galicia CNP.  If injection is ok, please put in CAM order.    Mabel Chi BSN, RN

## 2024-04-30 NOTE — TELEPHONE ENCOUNTER
I called patient and spoke to her on the phone.  Seen yesterday for sore throat.  Note says airway was patent, uvula midline.  Given liquid azithromycin once daily for 5 days.  She states she cannot swallow it and wants a shot of  PCN antibiotic.  Told her there is nationwide shortage of penicillin shots.  There is an alternative Rocephin antibiotic shot but to cover strep she would likely need 2 to 3 injections of it every other day.  This would probably be best coordinated through her primary care.  She states she feels she cannot swallow her spit and her neck feels swollen.  She is talking normally on the phone, no potato voice.  However told her if she cannot control her oral secretions that is a concerning symptom and she should go to the ER for further evaluation and treatment.

## 2024-05-02 NOTE — PROGRESS NOTES
Assessment & Plan     Female infertility  Discussed options with patient-unfortunately our OB group does not manage/prescribe injectable medications for infertility. We discussed options for her if she opts to pursue this avenue. Patient then decides to change over to Letrozole for a few cycles first. She does not believe she has coverage with a RE specialist, but we did discuss alternate OB/GYN clinics that can facilitate next steps for her if Letrozole not successful. We reviewed possible side effects including ovarian cyst formation(reversible), increased incidence of multiple pregnancy and, controversy concerning increasd risk of ovarian cancer later on in life. Patient was also advised about the timing of sexual intercourse in relation to Letrozole. Patient will complete Prometrium and notify me once next menses begins. Plan start with lowest dose of Letrozole and then check day 21 progesterone level. Discussed use for 3 cycles and would refer to alternate OB/GYN group if no evidence of ovulation at that time. Consider HSG if we do have evidence of ovulation given history of STI. Patient is not taking any weight loss medications, her primary provider has removed these from her medication list.     Danyel Garrett is a 28 year old, presenting for the following health issues:  Follow Up (Female infertility)    HPI       Follow up- Female infertility     Patient has completed 2 round of Clomid (50mg and 100mg) without evidence of ovulation. Currently on Prometrium to cause a withdrawal bleed. Would like to discuss change to Letrozole for her next cycle. After arriving for visit today, also wants to discuss starting injectable medication instead of oral.  Has tolerated Clomid well overall.     Review of Systems  Constitutional, HEENT, cardiovascular, pulmonary, gi and gu systems are negative, except as otherwise noted.      Objective    /76 (BP Location: Right arm, Patient Position: Sitting, Cuff Size:  "Adult Large)   Pulse 75   Ht 1.727 m (5' 8\")   Wt 125.1 kg (275 lb 12.8 oz)   LMP 03/29/2024 (Exact Date)   SpO2 95%   BMI 41.94 kg/m    Body mass index is 41.94 kg/m .  Physical Exam   GENERAL: alert and no distress  MS: no gross musculoskeletal defects noted, no edema  SKIN: no suspicious lesions or rashes  PSYCH: mentation appears normal, affect normal/bright      Signed Electronically by: RYLEY Meza CNP    "

## 2024-05-03 ENCOUNTER — OFFICE VISIT (OUTPATIENT)
Dept: OBGYN | Facility: CLINIC | Age: 29
End: 2024-05-03
Payer: COMMERCIAL

## 2024-05-03 VITALS
OXYGEN SATURATION: 95 % | BODY MASS INDEX: 41.8 KG/M2 | SYSTOLIC BLOOD PRESSURE: 108 MMHG | HEART RATE: 75 BPM | DIASTOLIC BLOOD PRESSURE: 76 MMHG | HEIGHT: 68 IN | WEIGHT: 275.8 LBS

## 2024-05-03 DIAGNOSIS — N97.9 FEMALE INFERTILITY: Primary | ICD-10-CM

## 2024-05-03 PROCEDURE — 99212 OFFICE O/P EST SF 10 MIN: CPT | Performed by: NURSE PRACTITIONER

## 2024-05-03 NOTE — PATIENT INSTRUCTIONS
If you have any questions regarding your visit, Please contact your care team.     [email or web address] Services: [phone]  To Schedule an Appointment 24/7  Call: [phone] HOURS TELEPHONE NUMBER     Mine Sanz- APRN CNP      Pastor Zendejas-Surgery Scheduler  Lissy-Surgery Scheduler         Monday 7:30am-2:00pm    Tuesday 7:30am-4:00pm    Wednesday 7:30am-2:00pm    Thursday 7:30am-11:00am    Friday 7:30am-2:00pm [address] [address]  Phone- [phone]   Fax- [phone]     Imaging Scheduling all locations  [phone]    [facility] and Delivery  [address] [place] [address]   [place] [address] [phone]         Urgent Care locations:  [facility]   Monday-Friday  10 am - 8 pm  Saturday and Sunday   9 am - 5 pm     [phone]   If you need a medication refill, please contact your pharmacy. Please allow 3 business days for your refill to be completed.  As always, Thank you for trusting us with your healthcare needs!      see additional instructions from your care team below

## 2024-05-13 ENCOUNTER — MYC MEDICAL ADVICE (OUTPATIENT)
Dept: OBGYN | Facility: CLINIC | Age: 29
End: 2024-05-13
Payer: COMMERCIAL

## 2024-05-13 DIAGNOSIS — N97.9 FEMALE INFERTILITY: Primary | ICD-10-CM

## 2024-05-13 NOTE — TELEPHONE ENCOUNTER
"Patient reports menses began today 5/13/2024.     Patient seen in clinic on 5/03/2024 for infertility.   4/29/2024 progesterone (PROMETRIUM) 200 MG capsule - 7 caps dispensed.     Per OV notes \"Patient will complete Prometrium and notify me once next menses begins. Plan start with lowest dose of Letrozole and then check day 21 progesterone level.\"    [facility] [address] [facility] SMITH RN     "

## 2024-05-14 RX ORDER — LETROZOLE 2.5 MG/1
2.5 TABLET, FILM COATED ORAL DAILY
Qty: 5 TABLET | Refills: 0 | Status: SHIPPED | OUTPATIENT
Start: 2024-05-14 | End: 2024-06-14

## 2024-06-14 ENCOUNTER — OFFICE VISIT (OUTPATIENT)
Dept: OBGYN | Facility: CLINIC | Age: 29
End: 2024-06-14
Payer: COMMERCIAL

## 2024-06-14 VITALS
HEART RATE: 56 BPM | DIASTOLIC BLOOD PRESSURE: 75 MMHG | HEIGHT: 68 IN | SYSTOLIC BLOOD PRESSURE: 119 MMHG | WEIGHT: 267.8 LBS | BODY MASS INDEX: 40.59 KG/M2 | OXYGEN SATURATION: 95 %

## 2024-06-14 DIAGNOSIS — N97.9 FEMALE INFERTILITY: Primary | ICD-10-CM

## 2024-06-14 DIAGNOSIS — Z87.42 HISTORY OF VAGINITIS: ICD-10-CM

## 2024-06-14 LAB
C TRACH DNA SPEC QL NAA+PROBE: NEGATIVE
CLUE CELLS: NORMAL
N GONORRHOEA DNA SPEC QL NAA+PROBE: NEGATIVE
TRICHOMONAS, WET PREP: NORMAL
WBC'S/HIGH POWER FIELD, WET PREP: NORMAL
YEAST, WET PREP: NORMAL

## 2024-06-14 PROCEDURE — 99214 OFFICE O/P EST MOD 30 MIN: CPT | Performed by: NURSE PRACTITIONER

## 2024-06-14 PROCEDURE — 87210 SMEAR WET MOUNT SALINE/INK: CPT | Performed by: NURSE PRACTITIONER

## 2024-06-14 PROCEDURE — 87591 N.GONORRHOEAE DNA AMP PROB: CPT | Performed by: NURSE PRACTITIONER

## 2024-06-14 PROCEDURE — 87491 CHLMYD TRACH DNA AMP PROBE: CPT | Performed by: NURSE PRACTITIONER

## 2024-06-14 RX ORDER — LETROZOLE 2.5 MG/1
2.5 TABLET, FILM COATED ORAL DAILY
Qty: 5 TABLET | Refills: 0 | Status: SHIPPED | OUTPATIENT
Start: 2024-06-14

## 2024-06-14 NOTE — PATIENT INSTRUCTIONS
If you have any questions regarding your visit, Please contact your care team.     [employer] Services: [phone]  To Schedule an Appointment 24/7  Call: [phone] HOURS TELEPHONE NUMBER     Mine Sanz- APRN CNP      Pastor Zendejas-Surgery Scheduler  Lissy-Surgery Scheduler         Monday 7:30am-2:00pm    Tuesday 7:30am-4:00pm    Wednesday 7:30am-2:00pm    Thursday 7:30am-11:00am    Friday 7:30am-2:00pm [address] [address]  Phone- [phone]   Fax- [phone]     Imaging Scheduling all locations  [phone]    [facility] Labor and Delivery  [address] [place] [address]   [place] [address] [phone]         Urgent Care locations:  [facility]   Monday-Friday  10 am - 8 pm  Saturday and Sunday   9 am - 5 pm     [phone]   If you need a medication refill, please contact your pharmacy. Please allow 3 business days for your refill to be completed.  As always, Thank you for trusting us with your healthcare needs!      see additional instructions from your care team below

## 2024-06-14 NOTE — PROGRESS NOTES
Assessment & Plan     Female infertility   We discussed her questions on possibly having ovulated with the Letrozole as she had a spontaneous cycle, which has not happened in some time. Plan to continue same dose of medication this round, cycle day 5-9. Strongly encouraged to schedule day 21 progesterone so we can confirm ovulation on this dose. Recommend partner complete semen analysis at this time as well.    - letrozole (FEMARA) 2.5 MG tablet; Take 1 tablet (2.5 mg) by mouth daily On cycle days 5-9  - Progesterone; Future    History of vaginitis  Negative screening wet prep today, await remaining results and treat if indicated.  - Chlamydia trachomatis PCR; Future  - Neisseria gonorrhoeae PCR; Future  - Wet preparation; Future  - Chlamydia trachomatis PCR  - Neisseria gonorrhoeae PCR  - Wet preparation    Danyel Garrett is a 28 year old, presenting for the following health issues:  Follow Up (Female infertility)    HPI       Follow up- Female infertility     Presents today for refill on Letrozole. Patient has done a few cycles on Clomid, but changed to Letrozole last cycle.   History of long episodes between cycles and with Clomid, did need to use Prometrium each cycle to induce menses. Patient did not return to clinic for her day 21 progesterone with the first cycle of Letrozole, but did start a spontaneous cycle on her own, which is a first in some time. Currently on cycle day 3. Tolerated the Letrozole last cycle. Additionally, patient is requesting screening for STI and vaginal infection, has history of vaginitis, pos Chlamydia in February. Felt her discharge this past month has been different than her norm, not necessarily noting itching, odor, pelvic pain, abnormal appearance to discharge. But would like testing as long as she is here today. Patient has not yet completed a semen analysis.     Review of Systems  Constitutional, HEENT, cardiovascular, pulmonary, gi and gu systems are negative, except as  "otherwise noted.      Objective    /75 (BP Location: Right arm, Patient Position: Sitting, Cuff Size: Adult Large)   Pulse 56   Ht 1.727 m (5' 8\")   Wt 121.5 kg (267 lb 12.8 oz)   LMP 06/12/2024 (Exact Date)   SpO2 95%   BMI 40.72 kg/m    Body mass index is 40.72 kg/m .  Physical Exam   GENERAL: alert and no distress  PELVIC: Declined-requested self collect for testing  MS: no gross musculoskeletal defects noted, no edema  SKIN: no suspicious lesions or rashes  PSYCH: mentation appears normal, affect normal/bright    Results for orders placed or performed in visit on 06/14/24 (from the past 24 hour(s))   Wet preparation    Specimen: Vagina; Swab   Result Value Ref Range    Trichomonas Absent Absent    Yeast Absent Absent    Clue Cells Absent Absent    WBCs/high power field None None           Signed Electronically by: RYLEY Meza CNP    "

## 2024-07-03 ENCOUNTER — LAB (OUTPATIENT)
Dept: LAB | Facility: CLINIC | Age: 29
End: 2024-07-03
Payer: COMMERCIAL

## 2024-07-03 DIAGNOSIS — N97.9 FEMALE INFERTILITY: ICD-10-CM

## 2024-07-03 PROCEDURE — 36415 COLL VENOUS BLD VENIPUNCTURE: CPT

## 2024-07-03 PROCEDURE — 84144 ASSAY OF PROGESTERONE: CPT

## 2024-07-04 LAB — PROGEST SERPL-MCNC: 6.2 NG/ML

## 2024-07-12 ENCOUNTER — MYC MEDICAL ADVICE (OUTPATIENT)
Dept: OBGYN | Facility: CLINIC | Age: 29
End: 2024-07-12
Payer: COMMERCIAL

## 2024-08-14 ENCOUNTER — PATIENT OUTREACH (OUTPATIENT)
Dept: FAMILY MEDICINE | Facility: CLINIC | Age: 29
End: 2024-08-14
Payer: MEDICAID

## 2024-08-14 NOTE — TELEPHONE ENCOUNTER
Patient Quality Outreach    Patient is due for the following:   Depression  -  PHQ-9 needed  Physical Preventive Adult Physical      Topic Date Due    COVID-19 Vaccine (1) Never done    Pneumococcal Vaccine (1 of 2 - PCV) Never done       Next Steps:   Schedule a Adult Preventative  Patient was assigned appropriate questionnaire to complete    Type of outreach:    Sent [employer] message.      Questions for provider review:    None           ROSALIO YEN MA

## 2024-08-27 ENCOUNTER — VIRTUAL VISIT (OUTPATIENT)
Dept: OBGYN | Facility: CLINIC | Age: 29
End: 2024-08-27
Payer: MEDICAID

## 2024-08-27 DIAGNOSIS — F33.9 EPISODE OF RECURRENT MAJOR DEPRESSIVE DISORDER, UNSPECIFIED DEPRESSION EPISODE SEVERITY (H): Primary | ICD-10-CM

## 2024-08-27 DIAGNOSIS — Z34.80 PRENATAL CARE, SUBSEQUENT PREGNANCY, UNSPECIFIED TRIMESTER: ICD-10-CM

## 2024-08-27 PROBLEM — Z3A.40 40 WEEKS GESTATION OF PREGNANCY: Status: ACTIVE | Noted: 2017-06-13

## 2024-08-27 PROBLEM — N91.2 AMENORRHEA: Status: ACTIVE | Noted: 2020-09-28

## 2024-08-27 PROBLEM — Z34.03 PRIMIGRAVIDA IN THIRD TRIMESTER: Status: ACTIVE | Noted: 2017-03-26

## 2024-08-27 PROBLEM — M54.50 BILATERAL LOW BACK PAIN WITHOUT SCIATICA: Status: ACTIVE | Noted: 2017-03-26

## 2024-08-27 PROBLEM — R63.5 EXCESSIVE WEIGHT GAIN: Status: ACTIVE | Noted: 2020-10-03

## 2024-08-27 PROBLEM — F33.1 MODERATE RECURRENT MAJOR DEPRESSION (H): Status: ACTIVE | Noted: 2017-04-14

## 2024-08-27 PROCEDURE — 99207 PR NO CHARGE NURSE ONLY: CPT | Mod: 93

## 2024-08-27 RX ORDER — VITAMIN A, VITAMIN C, VITAMIN D-3, VITAMIN E, VITAMIN B-1, VITAMIN B-2, NIACIN, VITAMIN B-6, CALCIUM, IRON, ZINC, COPPER 4000; 120; 400; 22; 1.84; 3; 20; 10; 1; 12; 200; 27; 25; 2 [IU]/1; MG/1; [IU]/1; MG/1; MG/1; MG/1; MG/1; MG/1; MG/1; UG/1; MG/1; MG/1; MG/1; MG/1
TABLET ORAL DAILY
COMMUNITY

## 2024-08-27 ASSESSMENT — PATIENT HEALTH QUESTIONNAIRE - PHQ9: SUM OF ALL RESPONSES TO PHQ QUESTIONS 1-9: 12

## 2024-08-27 NOTE — PATIENT INSTRUCTIONS
Learning About Pregnancy  Your Care Instructions     Your health in the early weeks of your pregnancy is particularly important for your baby's health. Take good care of yourself. Anything you do that harms your body can also harm your baby.  Make sure to go to all of your doctor appointments. Regular checkups will help keep you and your baby healthy.  How can you care for yourself at home?  Diet    Choose healthy foods like fruits, vegetables, whole grains, lean proteins, and healthy fats.     Choose foods that are good sources of calcium, iron, and folate. You can try dairy products, dark leafy greens, fortified orange juice and cereals, almonds, broccoli, dried fruit, and beans.     Do not skip meals or go for many hours without eating. If you are nauseated, try to eat a small, healthy snack every 2 to 3 hours.     Avoid fish that are high in mercury. These include shark, swordfish, blaine mackerel, marlin, orange roughy, and bigeye tuna, as well as tilefish from the [facility].     It's okay to eat up to 8 to 12 ounces a week of fish that are low in mercury or up to 4 ounces a week of fish that have medium levels of mercury. Some fish that are low in mercury are salmon, shrimp, canned light tuna, cod, and tilapia. Some fish that have medium levels of mercury are halibut and white albacore tuna.     Drink plenty of fluids. If you have kidney, heart, or liver disease and have to limit fluids, talk with your doctor before you increase the amount of fluids you drink.     Limit caffeine to about 200 to 300 mg per day. On average, a cup of brewed coffee has around 80 to 100 mg of caffeine.     Do not drink alcohol, such as beer, wine, or hard liquor.     Take a multivitamin that contains at least 400 micrograms (mcg) of folic acid to help prevent birth defects. Fortified cereal and whole wheat bread are good additional sources of folic acid.     Increase the calcium in your diet. Try to drink a quart of skim milk  each day. You may also take calcium supplements and choose foods such as cheese and yogurt.   Lifestyle    Make sure you go to your follow-up appointments.     Get plenty of rest. You may be unusually tired while you are pregnant.     Get at least 30 minutes of exercise on most days of the week. Walking is a good choice. If you have not exercised in the past, start out slowly. Take several short walks each day.     Do not smoke. If you need help quitting, talk to your doctor about stop-smoking programs. These can increase your chances of quitting for good.     Do not touch cat feces or litter boxes. Also, wash your hands after you handle raw meat, and fully cook all meat before you eat it. Wear gloves when you work in the yard or garden, and wash your hands well when you are done. Cat feces, raw or undercooked meat, and contaminated dirt can cause an infection that may harm your baby or lead to a miscarriage.     Avoid things that can make your body too hot and may be harmful to your baby, such as a hot tub or sauna. Or talk with your doctor before doing anything that raises your body temperature. Your doctor can tell you if it's safe.     Avoid chemical fumes, paint fumes, or poisons.     Do not use illegal drugs, marijuana, or alcohol.   Medicines    Review all of your medicines with your doctor. Some of your routine medicines may need to be changed to protect your baby.     Use acetaminophen (Tylenol) to relieve minor problems, such as a mild headache or backache or a mild fever with cold symptoms. Do not use nonsteroidal anti-inflammatory drugs (NSAIDs), such as ibuprofen (Advil, Motrin) or naproxen (Aleve), unless your doctor says it is okay.     Do not take two or more pain medicines at the same time unless the doctor told you to. Many pain medicines have acetaminophen, which is Tylenol. Too much acetaminophen (Tylenol) can be harmful.     Take your medicines exactly as prescribed. Call your doctor if you  "think you are having a problem with your medicine.   To manage morning sickness    Keep food in your stomach, but not too much at once. Try eating five or six small meals a day instead of three large meals.     For nausea when you wake up, eat a small snack, such as a couple of crackers or pretzels, before rising. Allow a few minutes for your stomach to settle before you slowly get up.     Try to avoid smells and foods that make you feel nauseated. High-fat or greasy foods, milk, and coffee may make nausea worse. Some foods that may be easier to tolerate include cold, spicy, sour, and salty foods.     Drink enough fluids. Water and other caffeine-free drinks are good choices.     Take your prenatal vitamins at night on a full stomach.     Try foods and drinks made with iliana. Iliana may help with nausea.     Get lots of rest. Morning sickness may be worse when you are tired.     Talk to your doctor about over-the-counter products, such as vitamin B6 or doxylamine, to help relieve symptoms.     Try a P6 acupressure wrist band. These anti-nausea wristbands help some people.   Follow-up care is a key part of your treatment and safety. Be sure to make and go to all appointments, and call your doctor if you are having problems. It's also a good idea to know your test results and keep a list of the medicines you take.  Where can you learn more?  Go to https://www.[employer].net/patiented  Enter E868 in the search box to learn more about \"Learning About Pregnancy.\"  Current as of: July 10, 2023               Content Version: 14.0    9637-0882 [employer].   Care instructions adapted under license by your healthcare professional. If you have questions about a medical condition or this instruction, always ask your healthcare professional. [employer] disclaims any warranty or liability for your use of this information.      Weeks 6 to 10 of Your Pregnancy: Care Instructions  During these weeks of " "pregnancy, your body goes through many changes. You may start to feel different, both in your body and your emotions. Each pregnancy is different, so there's no \"right\" way to feel. These early weeks are a time to make healthy choices for you and your pregnancy.    Take a daily prenatal vitamin. Choose one with folic acid in it.    Avoid alcohol, tobacco, and drugs (including marijuana). If you need help quitting, talk to your doctor.    Drink plenty of liquids.  Be sure to drink enough water. And limit sodas, other sweetened drinks, and caffeine.     Choose foods that are good sources of calcium, iron, and folate.  You can try dairy products, dark leafy greens, fortified orange juice and cereals, almonds, broccoli, dried fruit, and beans.     Avoid foods that may be harmful.  Don't eat raw meat, deli meat, raw seafood, or raw eggs. Avoid soft cheese and unpasteurized dairy, like Brie and blue cheese. And don't eat fish that contains a lot of mercury, like shark and swordfish.     Don't touch jarett litter or cat poop.  They can cause an infection that could be harmful during pregnancy.     Avoid things that can make your body too hot.  For example, avoid hot tubs and saunas.     Soothe morning sickness.  Try eating 5 or 6 small meals a day, getting some fresh air, or using suresh to control symptoms.     Ask your doctor about flu and COVID-19 shots.  Getting them can help protect against infection.   Follow-up care is a key part of your treatment and safety. Be sure to make and go to all appointments, and call your doctor if you are having problems. It's also a good idea to know your test results and keep a list of the medicines you take.  Where can you learn more?  Go to https://www.[employer].net/patiented  Enter G112 in the search box to learn more about \"Weeks 6 to 10 of Your Pregnancy: Care Instructions.\"  Current as of: July 10, 2023               Content Version: 14.0    8075-0500 [employer]. "   Care instructions adapted under license by your healthcare professional. If you have questions about a medical condition or this instruction, always ask your healthcare professional. [employer] disclaims any warranty or liability for your use of this information.         Pregnancy: Managing Morning Sickness (01:48)  Your health professional recommends that you watch this short online health video.  Learn how to manage morning sickness during pregnancy.   Purpose:  Goal: Learn how to manage morning sickness during pregnancy.    Watch: Scan the QR code or visit the link to view video       [email or web address]  Current as of: July 10, 2023  Content Version: 14.1 2006-2024 [employer].   Care instructions adapted under license by your healthcare professional. If you have questions about a medical condition or this instruction, always ask your healthcare professional. [employer] disclaims any warranty or liability for your use of this information.    Pregnancy and Heartburn: Care Instructions  Overview     Heartburn is a common problem during pregnancy.  Heartburn happens when stomach acid backs up into the tube that carries food to the stomach. This tube is called the esophagus. Early in pregnancy, heartburn is caused by hormone changes that slow down digestion. Later on, it's also caused by the large uterus pushing up on the stomach.  Even though you can't fix the cause, there are things you can do to get relief. Treating heartburn during pregnancy focuses first on making lifestyle changes, like changing what and how you eat, and on taking medicines.  Heartburn usually improves or goes away after childbirth.  Follow-up care is a key part of your treatment and safety. Be sure to make and go to all appointments, and call your doctor if you are having problems. It's also a good idea to know your test results and keep a list of the medicines you take.  How can you  "care for yourself at home?  Eat small, frequent meals.  Avoid foods that make your symptoms worse, such as chocolate, peppermint, and spicy foods. Avoid drinks with caffeine, such as coffee, tea, and sodas.  Avoid bending over or lying down after meals.  Take a short walk after you eat.  If heartburn is a problem at night, do not eat for 2 hours before bedtime.  Take antacids like Mylanta, Maalox, Rolaids, or Tums. Do not take antacids that have sodium bicarbonate, magnesium trisilicate, or aspirin. Be careful when you take over-the-counter antacid medicines. Many of these medicines have aspirin in them. While you are pregnant, do not take aspirin or medicines that contain aspirin unless your doctor says it is okay.  If you're not getting relief, talk to your doctor. You may be able to take a stronger acid-reducing medicine.  When should you call for help?   Call your doctor now or seek immediate medical care if:    You have new or worse belly pain.     You are vomiting.   Watch closely for changes in your health, and be sure to contact your doctor if:    You have new or worse symptoms of reflux.     You are losing weight.     You have trouble or pain swallowing.     You do not get better as expected.   Where can you learn more?  Go to https://[email or web address].net/patiented  Enter U946 in the search box to learn more about \"Pregnancy and Heartburn: Care Instructions.\"  Current as of: July 10, 2023  Content Version: 14.1 2006-2024 [employer].   Care instructions adapted under license by your healthcare professional. If you have questions about a medical condition or this instruction, always ask your healthcare professional. [employer] disclaims any warranty or liability for your use of this information.    Constipation: Care Instructions  Overview     Constipation means that you have a hard time passing stools (bowel movements). People pass stools from 3 times a day to once every 3 days. " What is normal for you may be different. Constipation may occur with pain in the rectum and cramping. The pain may get worse when you try to pass stools. Sometimes there are small amounts of bright red blood on toilet paper or the surface of stools. This is because of enlarged veins near the rectum (hemorrhoids).  A few changes in your diet and lifestyle may help you avoid ongoing constipation. Your doctor may also prescribe medicine to help loosen your stool.  Some medicines can cause constipation. These include pain medicines and antidepressants. Tell your doctor about all the medicines you take. Your doctor may want to make a medicine change to ease your symptoms.  Follow-up care is a key part of your treatment and safety. Be sure to make and go to all appointments, and call your doctor if you are having problems. It's also a good idea to know your test results and keep a list of the medicines you take.  How can you care for yourself at home?  Drink plenty of fluids. If you have kidney, heart, or liver disease and have to limit fluids, talk with your doctor before you increase the amount of fluids you drink.  Include high-fiber foods in your diet each day. These include fruits, vegetables, beans, and whole grains.  Get at least 30 minutes of exercise on most days of the week. Walking is a good choice. You also may want to do other activities, such as running, swimming, cycling, or playing tennis or team sports.  Take a fiber supplement, such as Citrucel or Metamucil, every day. Read and follow all instructions on the label.  Schedule time each day for a bowel movement. A daily routine may help. Take your time having a bowel movement, but don't sit for more than 10 minutes at a time. And don't strain too much.  Support your feet with a small step stool when you sit on the toilet. This helps flex your hips and places your pelvis in a squatting position.  Your doctor may recommend an over-the-counter laxative to  "relieve your constipation. Examples are Milk of Magnesia and MiraLax. Read and follow all instructions on the label. Do not use laxatives on a long-term basis.  When should you call for help?   Call your doctor now or seek immediate medical care if:    You have new or worse belly pain.     You have new or worse nausea or vomiting.     You have blood in your stools.   Watch closely for changes in your health, and be sure to contact your doctor if:    Your constipation is getting worse.     You do not get better as expected.   Where can you learn more?  Go to https://www.[employer].net/patiented  Enter P343 in the search box to learn more about \"Constipation: Care Instructions.\"  Current as of: October 19, 2023               Content Version: 14.0    2303-2856 [employer].   Care instructions adapted under license by your healthcare professional. If you have questions about a medical condition or this instruction, always ask your healthcare professional. [employer] disclaims any warranty or liability for your use of this information.      Learning About High-Iron Foods  What foods are high in iron?     The foods you eat contain nutrients, such as vitamins and minerals. Iron is a nutrient. Your body needs the right amount to stay healthy and work as it should. You can use the list below to help you make choices about which foods to eat.  Here are some foods that contain iron. They have 1 to 2 milligrams of iron per serving.  Fruits  Figs (dried), 5 figs  Vegetables  Asparagus (canned), 6 davis  Savana, beet, Swiss chard, or turnip greens, 1 cup  Dried peas, cooked,   cup  Seaweed, spirulina (dried),   cup  Spinach, (cooked)   cup or (raw) 1 cup  Grains  Cereals, fortified with iron, 1 cup  Grits (instant, cooked), fortified with iron,   cup  Meats and other protein foods  Beans (kidney, lima, navy, white), canned or cooked,   cup  Beef or lamb, 3 oz  Chicken giblets, 3 oz  Chickpeas " "(garbanzo beans),   cup  Liver of beef, lamb, or pork, 3 oz  Oysters (cooked), 3 oz  Sardines (canned), 3 oz  Soybeans (boiled),   cup  Tofu (firm),   cup  Work with your doctor to find out how much of this nutrient you need. Depending on your health, you may need more or less of it in your diet.  Where can you learn more?  Go to https://www.[employer].net/patiented  Enter R005 in the search box to learn more about \"Learning About High-Iron Foods.\"  Current as of: September 20, 2023  Content Version: 14.1    2072-9546 [employer].   Care instructions adapted under license by your healthcare professional. If you have questions about a medical condition or this instruction, always ask your healthcare professional. [employer] disclaims any warranty or liability for your use of this information.    Rh Antibodies Screening During Pregnancy: About This Test  What is it?     The Rh antibodies screening test is a blood test. It checks your blood for Rh antibodies. If you have Rh-negative blood and have been exposed to Rh-positive blood, your immune system may make antibodies to attack the Rh-positive blood. When a pregnant woman has these antibodies, it is called Rh sensitization.  Why is this test done?  The Rh antibodies screening test is done during pregnancy to find out if your baby is at risk for Rh disease. This can happen if you have Rh-negative blood and your baby has Rh-positive blood. If your Rh-negative blood mixes with Rh-positive blood, your immune system will make antibodies to attack the Rh-positive blood.  During pregnancy, these antibodies could attach to the baby's red blood cells. This can cause your baby to have serious health problems. The results of this test will help your doctor know how to best care for you and your baby during your pregnancy.  How do you prepare for the test?  In general, there's nothing you have to do before this test, unless your doctor tells you " "to.  How is the test done?  A health professional uses a needle to take a blood sample, usually from the arm.  What happens after the test?  You will probably be able to go home right away. It depends on the reason for the test.  You can go back to your usual activities right away.  Follow-up care is a key part of your treatment and safety. Be sure to make and go to all appointments, and call your doctor if you are having problems. It's also a good idea to keep a list of the medicines you take. Ask your doctor when you can expect to have your test results.  Where can you learn more?  Go to https://www.[employer].net/patiented  Enter P722 in the search box to learn more about \"Rh Antibodies Screening During Pregnancy: About This Test.\"  Current as of: July 10, 2023  Content Version: 14.1    3663-1806 [employer].   Care instructions adapted under license by your healthcare professional. If you have questions about a medical condition or this instruction, always ask your healthcare professional. [employer] disclaims any warranty or liability for your use of this information.    Learning About Preventing Rh Disease  What is Rh disease?     Rh disease can be a serious problem in pregnancy. It happens when substances called antibodies in the mother's blood cause red blood cells in her baby's blood to be destroyed. This can occur when the blood types of a mother and her baby do not match.  All blood has an Rh factor. This is what makes a blood type positive or negative. When you are Rh-negative, your baby may be Rh-negative or Rh-positive. If your baby has Rh-positive blood and it mixes with yours, your body will make antibodies. This is called Rh sensitization.  Most of the time, this is not a problem in a first pregnancy. But in future pregnancies, it could cause Rh disease.  A  with Rh disease has mild anemia and may have jaundice. In severe cases, anemia, jaundice, and swelling can be " "very dangerous or fatal. Some babies need to be delivered early. Some need special care in the NICU. A very sick baby will need a blood transfusion before or after birth.  Fortunately, Rh sensitization is usually easy to prevent.  That's why it's important to get your Rh status checked in your first trimester. It doesn't cause any warning signs. A blood test is the only way to know if you are Rh-sensitive or are at risk for it.  How can you prevent Rh disease?  If you are Rh-negative, your doctor gives you an Rh immune globulin shot (such as RhoGAM). It helps prevent your body from making the antibodies that attack your baby's red blood cells.  Timing is important. You need the shot at certain times during your pregnancy. And you need one anytime there is a chance that your baby's blood might mix with yours. That can happen with certain prenatal tests or when you have pregnancy bleeding, such as:  Right after any pregnancy loss, amniocentesis, or CVS testing.  After turning of a breech baby.  Before and maybe after childbirth. Your doctor gives you a shot around week 28. If your  is Rh-positive, you will have another shot.  Follow-up care is a key part of your treatment and safety. Be sure to make and go to all appointments, and call your doctor if you are having problems. It's also a good idea to know your test results and keep a list of the medicines you take.  Where can you learn more?  Go to https://www.[employer].net/patiented  Enter W177 in the search box to learn more about \"Learning About Preventing Rh Disease.\"  Current as of: July 10, 2023  Content Version: 14. [email or web address].   Care instructions adapted under license by your healthcare professional. If you have questions about a medical condition or this instruction, always ask your healthcare professional. [employer] disclaims any warranty or liability for your use of this information.    Learning About Rh " Immunoglobulin Shots  Introduction     An Rh immunoglobulin shot is given to pregnant women who have Rh-negative blood.  You may have Rh-negative blood, and your baby may have Rh-positive blood. If the two types of blood mix, your body will make antibodies. This is called Rh sensitization. Most of the time, this is not a problem the first time you're pregnant. But it could cause problems in future pregnancies.  This shot keeps your body from making the antibodies. You get the shot around 28 weeks of pregnancy. After the birth, your baby's blood is tested. If the blood is Rh positive, you will get another shot. You may also get the shot if you have vaginal bleeding while you are pregnant or if you have a miscarriage. These shots protect future pregnancies.  Women with Rh negative blood will need this shot each time they get pregnant.  Example  Rh immunoglobulin (HypRho-D, MICRhoGAM, and RhoGAM)  Possible side effects  Rare side effects may include:  Some mild pain where you got the shot.  A slight fever.  An allergic reaction.  You may have other side effects not listed here. Check the information that comes with your medicine.  What to know about taking this medicine  You may need more than one shot. You may need the shot again:  After amniocentesis, fetal blood sampling, or chorionic villus sampling tests.  If you have bleeding in your second or third trimester.  After turning of a breech baby.  After an injury to the belly while you are pregnant.  After a miscarriage or an .  Before or right after treatment for an ectopic or a partial molar pregnancy.  Tell your doctor if you have any allergies or have had a bad response to medicines in the past.  If you get this shot within 3 months of getting a live-virus vaccine, the vaccine may not work. Your doctor will tell you if you need more vaccine.  Check with your doctor or pharmacist before you use any other medicines. This includes over-the-counter medicines.  "Make sure your doctor knows all of the medicines, vitamins, herbs, and supplements you take. Taking some medicines at the same time can cause problems.  Where can you learn more?  Go to https://www.[employer].net/patiented  Enter V615 in the search box to learn more about \"Learning About Rh Immunoglobulin Shots.\"  Current as of: July 10, 2023               Content Version: 14.0    6299-2316 [employer].   Care instructions adapted under license by your healthcare professional. If you have questions about a medical condition or this instruction, always ask your healthcare professional. [employer] disclaims any warranty or liability for your use of this information.      Rubella (Sri [place] Measles): Care Instructions  Overview  Rubella, also called [place] measles or 3-day measles, is a disease caused by a virus. It spreads by coughs, sneezes, and close contact. Rubella usually is mild and does not cause long-term problems. But if you are pregnant and get it, you can give the disease to your unborn baby. This can cause serious birth defects.  While you have rubella, you may get a rash and a mild fever, and the lymph glands in your neck may swell. Older children often have a fever, eye pain, a sore throat, and body aches. You can relieve most symptoms with care at home. Avoid being around others, especially pregnant people, until your rash has been gone for at least 4 days. People who have not had this disease before or have not had the vaccine have the greatest chance of getting the virus.  Follow-up care is a key part of your treatment and safety. Be sure to make and go to all appointments, and call your doctor if you are having problems. It's also a good idea to know your test results and keep a list of the medicines you take.  How can you care for yourself at home?  Drink plenty of fluids. If you have kidney, heart, or liver disease and have to limit fluids, talk with your doctor before you " "increase the amount of fluids you drink.  Get plenty of rest to help your body heal.  Take an over-the-counter pain medicine, such as acetaminophen (Tylenol), ibuprofen (Advil, Motrin), or naproxen (Aleve), to reduce fever and discomfort. Read and follow all instructions on the label. Do not give aspirin to anyone younger than 20. It has been linked to Reye syndrome, a serious illness.  Do not take two or more pain medicines at the same time unless the doctor told you to. Many pain medicines have acetaminophen, which is Tylenol. Too much acetaminophen (Tylenol) can be harmful.  Try not to scratch the rash. Put cold, wet cloths on the rash to reduce itching.  Do not smoke. Smoking can make your symptoms worse. If you need help quitting, talk to your doctor about stop-smoking programs and medicines. These can increase your chances of quitting for good.  Avoid contact with people who have never had rubella and who have not been immunized.  When should you call for help?   Call your doctor now or seek immediate medical care if:    You have a fever with a stiff neck or a severe headache.     You are sensitive to light or feel very sleepy or confused.   Watch closely for changes in your health, and be sure to contact your doctor if:    You do not get better as expected.   Where can you learn more?  Go to [email or web address]/patiented  Enter B812 in the search box to learn more about \"Rubella ([place] Measles): Care Instructions.\"  Current as of: June 12, 2023               Content Version: 14.0    6806-3766 [employer].   Care instructions adapted under license by your healthcare professional. If you have questions about a medical condition or this instruction, always ask your healthcare professional. [employer] disclaims any warranty or liability for your use of this information.      Gonorrhea and Chlamydia: About These Tests  What is it?  These tests use a sample of urine or other body " fluid to look for the bacteria that cause these sexually transmitted infections (STIs). The fluid sample can come from the cervix, vagina, rectum, throat, or eyes.  Why is this test done?  These tests may be done to:  Find out if symptoms are caused by gonorrhea or chlamydia.  Check people who are at high risk of being infected with gonorrhea or chlamydia.  Retest people several months after they have been treated for gonorrhea or chlamydia.  Check for infection in your  if you had a gonorrhea or chlamydia infection at the time of delivery.  How can you prepare for the test?  If you are going to have a urine test, do not urinate for at least 1 hour before the test.  If you think you may have chlamydia or gonorrhea, don't have sexual intercourse until you get your test results. And you may want to have tests for other STIs, such as HIV.  How is the test done?  For a direct sample, a swab is used to collect body fluid from the cervix, vagina, rectum, throat, or eyes. Your doctor may collect the sample. Or you may be given instructions on how to collect your own sample.  For a urine sample, you will collect the urine that comes out when you first start to urinate. Don't wipe the genital area clean before you urinate.  How long does the test take?  The test will take a few minutes.  What happens after the test?  You will be able to go home right away.  You can go back to your usual activities right away.  If you do have an infection, don't have sexual intercourse for 7 days after you start treatment. And your sex partner(s) should also be treated.  Follow-up care is a key part of your treatment and safety. Be sure to make and go to all appointments, and call your doctor if you are having problems. It's also a good idea to keep a list of the medicines you take. Ask your doctor when you can expect to have your test results.  Where can you learn more?  Go to https://www.healthwise.net/patiented  Enter K976 in the  "search box to learn more about \"Gonorrhea and Chlamydia: About These Tests.\"  Current as of: November 27, 2023  Content Version: 14.1 2006-2024 [employer].   Care instructions adapted under license by your healthcare professional. If you have questions about a medical condition or this instruction, always ask your healthcare professional. [employer] disclaims any warranty or liability for your use of this information.    Trichomoniasis: About This Test  What is it?     This test uses a sample of urine or other body fluid to look for the tiny parasite that causes trichomoniasis (also called trich). The fluid sample can come from the vagina, cervix, or urethra. Your doctor may choose to use one or more of many available tests.  Why is it done?  A trich test may be done to:  Find out if symptoms are caused by trich.  Check people who are at high risk for being infected with trich.  Check after treatment to make sure that the infection is gone.  How do you prepare for the test?  If you are going to have a urine test, do not urinate for at least 1 hour before the test.  How is the test done?  For a direct sample, a swab is used to collect body fluid from the cervix, vagina, or urethra. Your doctor may collect the sample. Or you may be given instructions on how to collect your own sample.  For a urine sample, you will collect the urine that comes out when you first start to urinate. Don't wipe the area clean before you urinate.  How long does the test take?  It will take a few minutes to collect a sample.  What happens after the test?  You can go home right away.  You can go back to your usual activities right away.  You may get the test results the same day or several days later. It depends on the test used.  If you do have an infection, don't have sexual intercourse for 7 days after you start treatment. Your sex partner or partners should also be treated.  Follow-up care is a key part of " your treatment and safety. Be sure to make and go to all appointments, and call your doctor if you are having problems. Ask your doctor when you can expect to have your test results.  Current as of: November 27, 2023  Content Version: 14.1    7163-9525 [employer].   Care instructions adapted under license by your healthcare professional. If you have questions about a medical condition or this instruction, always ask your healthcare professional. [employer] disclaims any warranty or liability for your use of this information.    HIV Testing: Care Instructions  Overview  You can get tested for the human immunodeficiency virus (HIV). Most doctors use a blood test to check for HIV antibodies and antigens in your blood. It may also check for the genetic material (RNA) of HIV. Some tests use saliva to check for HIV antibodies. But these aren't as accurate. For example, they may give a false result if you've just been infected.  What do the results mean?    Normal (negative)    No HIV antibodies, antigens, or RNA were found.  You may need more testing. It can make sure your test results are correct.    Uncertain (indeterminate)    Test results didn't clearly show if you have an HIV infection.  HIV antibodies or antigens may not have formed yet.  Some other type of antibody or antigen may have affected the results.  You will need another test to be sure.    Abnormal (positive)    HIV antibodies, antigens, or RNA were found.  If you haven't had an RNA test yet, one will be done. If it's positive, you have HIV.  If your test result is positive, your doctor will talk to you. You will discuss starting treatment.  Follow-up care is a key part of your treatment and safety. Be sure to make and go to all appointments, and call your doctor if you are having problems. It's also a good idea to know your test results and keep a list of the medicines you take.  Where can you learn more?  Go to  "[email or web address].[email or web address]/patiented  Enter T792 in the search box to learn more about \"HIV Testing: Care Instructions.\"  Current as of: June 12, 2023               Content Version: 14.0    2838-1740 [employer].   Care instructions adapted under license by your healthcare professional. If you have questions about a medical condition or this instruction, always ask your healthcare professional. [employer] disclaims any warranty or liability for your use of this information.      Hepatitis C Virus Tests: About These Tests  What are they?     Hepatitis C virus tests are blood tests that check for substances in the blood that show whether you have hepatitis C now or had it in the past. The tests can also tell you what type of hepatitis C you have and how severe the disease is. This can help your doctor with treatment.  If the tests show that you have long-term hepatitis C, you need to take steps to prevent spreading the disease.  Why are these tests done?  You may need these tests if:  You have symptoms of hepatitis.  You may have been exposed to the virus. You are more likely to have been exposed to the virus if you inject drugs or are exposed to body fluids (such as if you are a health care worker).  You've had other tests that show you have liver problems.  You are 18 to 79 years old.  You have an HIV infection.  The tests also are done to help your doctor decide about your treatment and see how well it works.  How do you prepare for the test?  In general, there's nothing you have to do before this test, unless your doctor tells you to.  How is the test done?  A health professional uses a needle to take a blood sample, usually from the arm.  What happens after these tests?  You will probably be able to go home right away.  You can go back to your usual activities right away.  Follow-up care is a key part of your treatment and safety. Be sure to make and go to all appointments, and call " "your doctor if you are having problems. It's also a good idea to keep a list of the medicines you take. Ask your doctor when you can expect to have your test results.  Where can you learn more?  Go to https://www.[email or web address].net/patiented  Enter W551 in the search box to learn more about \"Hepatitis C Virus Tests: About These Tests.\"  Current as of: June 12, 2023               Content Version: 14.0    8506-1301 [employer].   Care instructions adapted under license by your healthcare professional. If you have questions about a medical condition or this instruction, always ask your healthcare professional. [employer] disclaims any warranty or liability for your use of this information.      Learning About Fetal Ultrasound Results  What is a fetal ultrasound?     Fetal ultrasound is a test that lets your doctor see an image of your baby. Your doctor learns information about your baby from this picture. You may find out, for example, if you are having a boy or a girl. But the main reason you have this test is to get information about your baby's health.  (You may hear your baby called a fetus. This is a common medical term for a baby that's growing in the mother's uterus.)  What kind of information can you learn from this test?  The findings of an ultrasound fall into two categories, normal and abnormal.  Normal  The fetus is the right size for its age.  The placenta is the expected size and does not cover the cervix.  There is enough amniotic fluid in the uterus.  No birth defects can be seen.  Abnormal  The fetus is small or large for its age.  The placenta covers the cervix.  There is too much or too little amniotic fluid in the uterus.  The fetus may have a birth defect.  What does an abnormal result mean?  Abnormal seems to imply that something is wrong with your baby. But what it means is that the test has shown something the doctor wants to take a closer look at.  And that's what happens " next. Your doctor will talk to you about what further test or tests you may need.  What do the results mean?  Some of the things your doctor may see on an abnormal ultrasound include:  Echogenic bowel.  The bowel looks very bright on the screen. This could mean that there's blood in the bowel. Or it could mean that something is blocking the small bowel.  Increased nuchal translucency.  The ultrasound measures the thickness at the back of the baby's neck. An increase in thickness is sometimes an early sign of Down syndrome.  Increased or decreased amniotic fluid.  The doctor will look for a reason for the level of amniotic fluid and will watch the pregnancy closely as it progresses.  Large ventricles.  Ventricles in the brain look larger than they should. Your doctor may take a closer look at the brain.  Renal pyelectasis/hydronephrosis.  The ultrasound measures the fluid around the kidney. If there is more fluid than expected, there is a chance of urinary tract or kidney problems.  Short long bones.  The ultrasound measures certain arm and leg bones. A long bone (humerus or femur) that is shorter than average could be a sign of Down syndrome.  Subchorionic hemorrhage.  An ultrasound can show bleeding under one of the membranes that surrounds the fetus. Some women don't have symptoms of bleeding. The ultrasound can find this problem when women are not bleeding from their vagina. Women who have this condition have a slightly higher chance of miscarriage.  What do you do now?  Take a deep breath, and let it out. Keep in mind that an abnormal finding on an ultrasound, after it's coupled with more information, may:  Turn out to be nothing.  Turn out to be something mild that won't affect the baby.  Turn out to be something more serious. But if this happens, early diagnosis helps you and your doctor plan treatment options sooner rather than later.  Your medical team is there for you. So are your family and friends. Ask  "questions, and get the help and support you need.  Follow-up care is a key part of your treatment and safety. Be sure to make and go to all appointments, and call your doctor if you are having problems. It's also a good idea to know your test results and keep a list of the medicines you take.  Where can you learn more?  Go to [email or web address]/patiented  Enter K451 in the search box to learn more about \"Learning About Fetal Ultrasound Results.\"  Current as of: July 10, 2023  Content Version: 14.1    6615-6325 [employer].   Care instructions adapted under license by your healthcare professional. If you have questions about a medical condition or this instruction, always ask your healthcare professional. [employer] disclaims any warranty or liability for your use of this information.    Learning About Prenatal Visits  Overview     Regular prenatal visits are very important during any pregnancy. These quick office visits may seem simple and routine. But they can help you have a safe and healthy pregnancy. Your doctor is watching for problems that can only be found through regular checkups. The visits also give you and your doctor time to build a good relationship.  After your first visit, you will most likely start on a schedule of monthly visits. In your third trimester, the visits will get more frequent. Based on your health, your age, and if you've had a normal, full-term pregnancy before, your doctor may want to see you more or less often.  At different times in your pregnancy, you will have exams and tests. Some are routine. Others are done only when there is a chance of a problem. Everything healthy you do for your body helps you have a healthy pregnancy. Rest when you need it. Eat well, drink plenty of water, and exercise regularly.  What happens during a prenatal visit?  You will have blood pressure checks, along with urine tests. You also may have blood tests. If you need " to go to the bathroom while waiting for the doctor, tell the nurse. You will be given a sample cup so your urine can be tested.  You will be weighed and have your belly measured.  Your doctor may listen to the fetal heartbeat with a special device.  At about 24 weeks, and possibly earlier in your pregnancy, your doctor will check your blood sugar (glucose tolerance test) for diabetes that can occur during pregnancy. This is gestational diabetes, which can be harmful.  You will have tests to check for infections that could harm your . These include group B streptococcus and hepatitis B.  Your doctor may do ultrasounds to check for problems. This also checks the position of the fetus. An ultrasound uses sound waves to produce a picture of the fetus.  You may get your vaccines updated.  Your doctor may ask you questions to check for signs of anxiety or depression. Tell your doctor if you feel sad, anxious, or hopeless for more than a few days.  You may have other tests at any time during your pregnancy.  Use your visits to discuss with your doctor any concerns you have.  How can you care for yourself at home?  Get plenty of rest.  Try to exercise every day, if your doctor says it is okay. If you have not exercised in the past, start out slowly. For example, you can take short walks each day.  Choose healthy foods, such as fruits, vegetables, whole grains, lean proteins, low-fat dairy, and healthy fats.  Drink plenty of fluids. Cut down on drinks with caffeine, such as coffee, tea, and cola. If you have kidney, heart, or liver disease and have to limit fluids, talk with your doctor before you increase the amount of fluids you drink.  Try to avoid chemical fumes, paint fumes, and poisons.  If you smoke, vape, or use alcohol, marijuana, or other drugs, quit or cut back as much as you can. Talk to your doctor if you need help quitting.  Review all of your medicines, including over-the-counter medicines and  "supplements, with your doctor. Some of your routine medicines may need to be changed. Do not stop or start taking any medicines without talking to your doctor first.  Follow-up care is a key part of your treatment and safety. Be sure to make and go to all appointments, and call your doctor if you are having problems. It's also a good idea to know your test results and keep a list of the medicines you take.  Where can you learn more?  Go to https://[email or web address]/patiented  Enter J502 in the search box to learn more about \"Learning About Prenatal Visits.\"  Current as of: July 10, 2023               Content Version: 14.0    1258-8446 [employer].   Care instructions adapted under license by your healthcare professional. If you have questions about a medical condition or this instruction, always ask your healthcare professional. [employer] disclaims any warranty or liability for your use of this information.      Intimate Partner Violence: Care Instructions  Overview     If you want to save this information but don't think it is safe to take it home, see if a trusted friend can keep it for you. Plan ahead. Know who you can call for help, and memorize the phone number.   Be careful online too. Your online activity may be seen by others. Do not use your personal computer or device to read about this topic. Use a safe computer, such as one at work, a friend's home, or a library.    Intimate partner violence--a type of domestic abuse--is different from an argument now and then. It is a pattern of abuse that one person may use to control another person's behavior. It may start with threats and name-calling. Then, it may lead to more serious acts, like pushing and slapping. The abuse also may occur in other areas. For example, the abuser may withhold money or spend a partner's money without their knowledge.  Abuse can cause serious harm. You are more likely to have a long-term health problem " from the injuries and stress of living in a violent relationship. People who are sexually abused by their partners have more sexually transmitted infections and unplanned pregnancies. Anyone who is abused also faces emotional pain. Anyone can be abused in relationships. In some relationships, both people use abusive behavior.  If you are pregnant, abuse can cause problems such as poor weight gain, infections, and bleeding. Abuse during this time may increase your baby's risk of low birth weight, premature birth, and death.  Follow-up care is a key part of your treatment and safety. Be sure to make and go to all appointments, and call your doctor if you are having problems. It's also a good idea to know your test results and keep a list of the medicines you take.  How can you care for yourself at home?  If you do not have a safe place to stay, discuss this with your doctor before you leave.  Have a plan for where to go, how to leave your home, and where to stay in case of an emergency. Do not tell your partner about your plan. Contact:  The National Domestic Violence Hotline toll-free at 1-251.779.9916. They can help you find resources in your area.  Your local police department, hospital, or clinic for information about shelters and safe homes near you.  Talk to a trusted friend or neighbor, a counselor, or a margi leader. Do not feel that you have to hide what happened.  Teach your children how to call for help in an emergency.  Be alert to warning signs, such as threats, heavy alcohol use, or drug use. This can help you avoid danger.  If you can, make sure that there are no guns or other weapons in your home.  When should you call for help?   Call 911 anytime you think you may need emergency care. For example, call if:    You or someone else has just been abused.     You think you or someone else is in danger of being abused.   Watch closely for changes in your health, and be sure to contact your doctor if you  "have any problems.  Where can you learn more?  Go to https://www.[employer].net/patiented  Enter G282 in the search box to learn more about \"Intimate Partner Violence: Care Instructions.\"  Current as of: June 24, 2023               Content Version: 14.0    5739-5232 [employer].   Care instructions adapted under license by your healthcare professional. If you have questions about a medical condition or this instruction, always ask your healthcare professional. [employer] disclaims any warranty or liability for your use of this information.      Intimate Partner Violence Safety Instructions: Care Instructions  Overview     If you want to save this information but don't think it is safe to take it home, see if a trusted friend can keep it for you. Plan ahead. Know who you can call for help, and memorize the phone number.   Be careful online too. Your online activity may be seen by others. Do not use your personal computer or device to read about this topic. Use a safe computer, such as one at work, a friend's home, or a library.    When you are abused by a spouse or partner, you can take actions to protect yourself and your children.  You can increase your safety whether you decide to stay with your spouse or partner or you decide to leave. You may want to make a safety plan and pack a bag ahead of time. This will help you leave quickly when you decide to. Remember, you cannot change your partner's actions, but you can find help for you and your children. No one deserves to be abused.  Follow-up care is a key part of your treatment and safety. Be sure to make and go to all appointments, and call your doctor if you are having problems. It's also a good idea to know your test results and keep a list of the medicines you take.  How can you care for yourself at home?  Make a plan for your safety   If you decide to stay with your abusive spouse or partner, you can do the following to increase " your safety:  Decide what works best to keep you safe in an emergency.  Know who you can call to help you in an emergency.  Decide if you will call the police if you get hurt again. If you can, agree on a signal with your children or neighbor to call the police for you if you need help. You can flash lights or hang something out of a window.  Choose a safe place to go for a short time if you need to leave home. Memorize the address and phone number.  Learn escape routes out of your home in case you need to leave in a hurry. Teach your children different ways to get out of your home quickly if they need to.  If you can, hide or lock up things that can be used as weapons (guns, knives, hammers).  Learn the number of a domestic violence shelter. Talk to the people there about how they can help.  Find out about other community resources that can help you.  Take pictures of bruises or other injuries if you can. You can also take pictures of things your abuser has broken.  Teach your children that violence is never okay. Tell them that they do not deserve to be hurt.  Pack a bag   Prepare a bag with things you will need if you leave suddenly. Leave it with a friend, a relative, or someone else you trust. You could include the following items in the bag:  A set of keys to your home and car.  Emergency phone numbers and addresses.  Money such as cash or checks. You can also ask a friend, a relative, or someone else you trust to hold money for you.  Copies of legal documents such as house and car titles or rent receipts, birth certificates, Social Security card, voter registration, marriage and 's licenses, and your children's health records.  Personal items you would need for a few days, such as clothes, a toothbrush, toothpaste, and any medicines you or your children need.  A favorite toy or book for your child or children.  Diapers and bottles, if you have very young children.  Pictures that show signs of abuse and  "violence. You may also add pictures of your abuser.  If you leave   If you decide to leave, you can take the following steps:  Go to the emergency room at a hospital if you have been hurt.  Think about asking the police to be with you as you leave. They can protect you as you leave your home.  If you decide to leave secretly, remember that activities can be tracked. Your abuser may still have access to your cell phone, email, and credit cards. It may be possible for these to be traced. Always be aware of your surroundings.  If this is an emergency, do not worry about gathering up anything. Just leave--your safety is most important.  If your abuser moves out, change the locks on the doors. If you have a security system, change the access code.  When should you call for help?   Call 911 anytime you think you may need emergency care. For example, call if:    You or someone else has just been abused.     You think you or someone else is in danger of being abused.   Watch closely for changes in your health, and be sure to contact your doctor if you have any problems.  Where can you learn more?  Go to https://www.SeatKarma.net/patiented  Enter A752 in the search box to learn more about \"Intimate Partner Violence Safety Instructions: Care Instructions.\"  Current as of: June 24, 2023               Content Version: 14.0    0801-4759 [employer].   Care instructions adapted under license by your healthcare professional. If you have questions about a medical condition or this instruction, always ask your healthcare professional. [employer] disclaims any warranty or liability for your use of this information.      Learning About Intimate Partner Violence  What is intimate partner violence?  Intimate partner violence is a type of domestic abuse. It's threatening, emotionally harmful, or violent behavior in a personal relationship. It can happen between past or current partners or spouses. In some " relationships both people abuse each other. One partner may be more abusive. Or the abuse may be equal.  Abuse can affect people of any ethnic group, race, or Scientologist. It can affect teens, adults, or the elderly. And it can happen to people of any sexual orientation, gender, or social status.  Abusers use fear, bullying, and threats to control their partners. They may control what their partners do. They may control where their partners go or who they see. They may act jealous, controlling, or possessive. These early signs of abuse may happen soon after the start of the relationship. Sometimes it can be hard to notice abuse at first. But after the relationship becomes more serious, the abuse may get worse.  If you are being abused in your relationship, it's important to get help. The abuse is not your fault. You don't have to face it alone.  Be careful  It may not be safe to take home domestic abuse information like this handout. Some people ask a trusted friend to keep it for them. It's also important to plan ahead and to memorize the phone number of places you can go for help. If you are concerned about your safety, do not use your computer, smartphone, or tablet to read about domestic abuse.   What are the types of intimate partner violence?  Abuse can happen in different ways. Each type can happen on its own or in combination with others.  Emotional abuse  Emotional abuse is a pattern of threats, insults, or controlling behavior. It includes verbal abuse. It goes beyond healthy disagreements in a relationship. It's a sign of an unhealthy relationship.  Do you feel threatened, intimidated, or controlled?  Does your partner:  Threaten your children, other family members, or pets?  Use jokes meant to embarrass or shame you?  Call you names?  Tell you that you are a bad parent?  Threaten to take away your children?  Threaten to have you or your family members deported?  Control your access to money or other basic  needs?  Control what you do, who you see or talk to, or where you go?  Another form of emotional abuse is denying that it is happening. Or the abuser may act like the abuse is no big deal or is your fault.  Sexual abuse  With sexual abuse, abusers may try to convince or force you to have sex. They may force you into sex acts you're not comfortable with. Or they may sexually assault you. Sexual abuse can happen even if you are in a committed relationship.  Physical abuse  Physical abuse means that a partner hits, kicks, or does something else to physically hurt you. Physical abuse that starts with a slap might lead to kicking, shoving, and choking over time. The abuser may also threaten to hurt or kill you.  Stalking  Stalking means that an abuser gives you attention that you do not want and that causes you fear. Examples of stalking include:  Following you.  Showing up at places where the abuser isn't invited, such as at your work or school.  Constantly calling or texting you.  What problems can  to?  Intimate partner violence can be very dangerous. It can cause serious, repeated injury. It can even lead to death.  All forms of abuse can cause long-term health problems from the stress of a violent relationship. Verbal abuse can lead to sexual and physical abuse.  Abuse causes:  Emotional pain.  Depression.  Anxiety.  Post-traumatic stress.  Sexual abuse can lead to sexually transmitted infections (such as HIV/AIDS) and unplanned pregnancy.  Pregnancy can be a very dangerous time for people in abusive relationships. Abuse can cause or increase the risk of problems during pregnancy. These include low weight gain, anemia, infections, and bleeding. Abuse may also increase your baby's risk of low birth weight, premature birth, and death.  It can be hard for some victims of abuse to ask for help or to leave their relationship. You may feel scared, stuck, or not sure what steps to take. But it's important not to  "ignore abuse. Talking to someone you trust could be the first step to ending the abuse and taking care of your own health and happiness again. There are resources available that can help keep you safe.  Where can you get help?  Talk to a trusted friend. Find a local advocacy group, or talk to your doctor about the abuse.  Contact the National Domestic Violence Hotline at 1-769-867-SAFE (1-683.755.7626) for more safety tips. They can guide you to groups in your area that can help. Or go to the National Coalition Against Domestic Violence website at www.thehotlLinq3.org to learn more.  Domestic violence groups or a counselor in your area can help you make a safety plan for yourself and your children.  When to call for help  Call 911 anytime you think you may need emergency care. For example, call if:  You think that you or someone you know is in danger of being abused.  You have been hurt and can't have someone safely take you to emergency care.  You have just been abused.  A family member has just been abused.  Where can you learn more?  Go to https://www.Lighting by LED.net/patiented  Enter S665 in the search box to learn more about \"Learning About Intimate Partner Violence.\"  Current as of: June 24, 2023  Content Version: 14.1 2006-2024 [employer].   Care instructions adapted under license by your healthcare professional. If you have questions about a medical condition or this instruction, always ask your healthcare professional. [employer] disclaims any warranty or liability for your use of this information.    Vaginal Bleeding During Pregnancy: Care Instructions  Overview     It's common to have some vaginal spotting when you are pregnant. In some cases, the bleeding isn't serious. And there aren't any more problems with the pregnancy.  But sometimes bleeding is a sign of a more serious problem. This is more common if the bleeding is heavy or painful. Examples of more serious problems " include miscarriage, an ectopic pregnancy, and a problem with the placenta.  You may have to see your doctor again to be sure everything is okay. You may also need more tests to find the cause of the bleeding.  Home treatment may be all you need. But it depends on what is causing the bleeding. Be sure to tell your doctor if you have any new symptoms or if your symptoms get worse.  The doctor has checked you carefully, but problems can develop later. If you notice any problems or new symptoms, get medical treatment right away.  Follow-up care is a key part of your treatment and safety. Be sure to make and go to all appointments, and call your doctor if you are having problems. It's also a good idea to know your test results and keep a list of the medicines you take.  How can you care for yourself at home?  If your doctor prescribed medicines, take them exactly as directed. Call your doctor if you think you are having a problem with your medicine.  Do not have vaginal sex until your doctor says it's okay.  Do not put anything in your vagina until your doctor says it's okay.  Ask your doctor about other activities you can or can't do.  Get a lot of rest. Being pregnant can make you tired.  Do not use nonsteroidal anti-inflammatory drugs (NSAIDs), such as ibuprofen (Advil, Motrin), naproxen (Aleve), or aspirin, unless your doctor says it is okay.  When should you call for help?   Call 911 anytime you think you may need emergency care. For example, call if:    You passed out (lost consciousness).     You have severe vaginal bleeding. This means you are soaking through a pad each hour for 2 or more hours.     You have sudden, severe pain in your belly or pelvis.   Call your doctor now or seek immediate medical care if:    You have new or worse vaginal bleeding.     You are dizzy or lightheaded, or you feel like you may faint.     You have pain in your belly, pelvis, or lower back.     You think that you are in labor.      "You have a sudden release of fluid from your vagina.     You've been having regular contractions for an hour. This means that you've had at least 8 contractions within 1 hour or at least 4 contractions within 20 minutes, even after you change your position and drink fluids.     You notice that your baby has stopped moving or is moving much less than normal.   Watch closely for changes in your health, and be sure to contact your doctor if you have any problems.  Where can you learn more?  Go to https://www.GoTable.EUROBOX/patiented  Enter N829 in the search box to learn more about \"Vaginal Bleeding During Pregnancy: Care Instructions.\"  Current as of: July 10, 2023               Content Version: 14.0    0506-4087 [employer].   Care instructions adapted under license by your healthcare professional. If you have questions about a medical condition or this instruction, always ask your healthcare professional. [employer] disclaims any warranty or liability for your use of this information.      Weeks 10 to 14 of Your Pregnancy: Care Instructions  It's now possible to hear the fetus's heartbeat with a special ultrasound device. And the fetus's organs are developing.    Decide about tests to check for birth defects. Think about your age, your chance of passing on a family disease, your need to know about any problems, and what you might do after you have the test results.    It's okay to exercise. Try activities such as walking or swimming. Check with your doctor before starting a new program.    You may feel more tired than usual.  Taking naps during the day may help.     You may feel emotional.  It might help to talk to someone.     You may have headaches.  Try lying down and putting a cool cloth over your forehead.     You can use acetaminophen (Tylenol) for pain relief.  Don't take any anti-inflammatory medicines (such as Advil, Motrin, Aleve), unless your doctor says it's okay.     You may " "feel a fullness or aching in your lower belly.  This can feel like the kind of cramps you might get before a period. A back rub may help.     You may need to urinate more.  Your growing uterus and changing hormones can affect your bladder.     You may feel sick to your stomach (morning sickness).  Try avoiding food and smells that make you feel sick.     Your breasts may feel different.  They may feel tender or get bigger. Your nipples may get darker. Try a bra that gives you good support.     Avoid alcohol, tobacco, and drugs (including marijuana).  If you need help quitting, talk to your doctor.     Take a daily prenatal vitamin.  Choose one with folic acid.   Follow-up care is a key part of your treatment and safety. Be sure to make and go to all appointments, and call your doctor if you are having problems. It's also a good idea to know your test results and keep a list of the medicines you take.  Where can you learn more?  Go to https://www.GivU.net/patiented  Enter E090 in the search box to learn more about \"Weeks 10 to 14 of Your Pregnancy: Care Instructions.\"  Current as of: July 10, 2023               Content Version: 14.0    6687-5404 [employer].   Care instructions adapted under license by your healthcare professional. If you have questions about a medical condition or this instruction, always ask your healthcare professional. [employer] disclaims any warranty or liability for your use of this information.        Nutrition During Pregnancy: Care Instructions  Overview     Healthy eating when you are pregnant is important for you and your baby. It can help you feel well and have a successful pregnancy and delivery. During pregnancy your nutrition needs increase. Even if you have excellent eating habits, your doctor may recommend a multivitamin to make sure you get enough iron and folic acid.  You may wonder how much weight you should gain. In general, if you were at a " healthy weight before you became pregnant, then you should gain between 25 and 35 pounds. If you were overweight before pregnancy, then you'll likely be advised to gain 15 to 25 pounds. If you were underweight before pregnancy, then you'll probably be advised to gain 28 to 40 pounds. Your doctor will work with you to set a weight goal that is right for you. Gaining a healthy amount of weight helps you have a healthy baby.  Follow-up care is a key part of your treatment and safety. Be sure to make and go to all appointments, and call your doctor if you are having problems. It's also a good idea to know your test results and keep a list of the medicines you take.  How can you care for yourself at home?  Eat plenty of fruits and vegetables. Include a variety of orange, yellow, and leafy dark-green vegetables every day.  Choose whole-grain bread, cereal, and pasta. Good choices include whole wheat bread, whole wheat pasta, brown rice, and oatmeal.  Get 4 or more servings of milk and milk products each day. Good choices include nonfat or low-fat milk, yogurt, and cheese. If you cannot eat milk products, you can get calcium from calcium-fortified products such as orange juice, soy milk, and tofu. Other non-milk sources of calcium include leafy green vegetables, such as broccoli, kale, mustard greens, turnip greens, bok mansoor, and brussels sprouts.  If you eat meat, pick lower-fat types. Good choices include lean cuts of meat and chicken or turkey without the skin.  Avoid fish that are high in mercury. These include shark, swordfish, blaine mackerel, marlin, orange roughy, and bigeye tuna, as well as tilefish from the [employer] [place].  It's okay to eat up to 8 to 12 ounces a week of fish that are low in mercury or up to 4 ounces a week of fish that have medium levels of mercury. Some fish that are low in mercury are salmon, shrimp, canned light tuna, cod, and tilapia. Some fish that have medium levels of mercury are halibut  "and white albacore tuna.  For more advice about eating fish, you can visit the U.S. Food and Drug Administration (FDA) or U.S. Environmental Protection Agency (EPA) website.  Heat lunch meats (such as turkey, ham, or bologna) to 165 F before you eat them. This reduces your risk of getting sick from a kind of bacteria that can be found in lunch meats.  Do not eat unpasteurized soft cheeses, such as brie, feta, fresh mozzarella, and blue cheese. They have a bacteria that could harm your baby.  Limit caffeine to about 200 to 300 mg per day. On average, a cup of brewed coffee has around 80 to 100 mg of caffeine.  Do not drink any alcohol. No amount of alcohol has been found to be safe during pregnancy.  Do not diet or try to lose weight. For example, do not follow a low-carbohydrate diet. If you are overweight at the start of your pregnancy, your doctor will work with you to manage your weight gain.  Tell your doctor about all vitamins and supplements you take.  When should you call for help?  Watch closely for changes in your health, and be sure to contact your doctor if you have any problems.  Where can you learn more?  Go to https://www.Songbird.net/patiented  Enter Y785 in the search box to learn more about \"Nutrition During Pregnancy: Care Instructions.\"  Current as of: September 20, 2023               Content Version: 14.0    2365-0058 [employer].   Care instructions adapted under license by your healthcare professional. If you have questions about a medical condition or this instruction, always ask your healthcare professional. [employer] disclaims any warranty or liability for your use of this information.      Exercise During Pregnancy: Care Instructions  Overview     Exercise is good for you during a healthy pregnancy. It can relieve back pain, swelling, and other discomforts. It also prepares your muscles for childbirth. And exercise can improve your energy level and help you " sleep better.  If your doctor advises it, get more exercise. For example, walking is a good choice. Bit by bit, increase the amount you walk every day. Try for at least 30 minutes on most days of the week. You could also try a prenatal exercise class. But if you do not already exercise, be sure to talk with your doctor before you start a new exercise program. Doctors do not recommend contact sports during pregnancy.  Follow-up care is a key part of your treatment and safety. Be sure to make and go to all appointments, and call your doctor if you are having problems. It's also a good idea to know your test results and keep a list of the medicines you take.  How can you care for yourself at home?  Talk with your doctor about the right kind of exercise for each stage of pregnancy.  Listen to your body to know if your exercise is at a safe level.  Do not become overheated while you exercise. High body temperature can be harmful. Avoid activities that can make your body too hot.  If you feel tired, take it easy. You might walk instead of run.  If you are used to strenuous exercise, ask your doctor how to know when it's time to slow down.  If you exercised before getting pregnant, you should be able to keep up your routine early in your pregnancy. Later in your pregnancy, you may want to switch to more gentle activities.  Drink plenty of fluids before, during, and after exercise.  Avoid contact sports, such as soccer and basketball. Also avoid risky activities. These include scuba diving, horseback riding, and exercising at a high altitude (above 6,000 feet). If you live in a place with a high altitude, talk to your doctor about how you can exercise safely.  Do not get overtired while you exercise. You should be able to talk while you work out.  After your fourth month of pregnancy, avoid exercises that require you to lie flat on your back on a hard surface. These include sit-ups and some yoga poses.  Get plenty of rest.  "You may be very tired while you are pregnant.  Where can you learn more?  Go to https://www.[employer].net/patiented  Enter S801 in the search box to learn more about \"Exercise During Pregnancy: Care Instructions.\"  Current as of: July 10, 2023               Content Version: 14.0    4104-2539 [employer].   Care instructions adapted under license by your healthcare professional. If you have questions about a medical condition or this instruction, always ask your healthcare professional. [employer] disclaims any warranty or liability for your use of this information.      Learning About Pregnancy and Obesity  How does your weight affect your pregnancy?     The basics of prenatal care are the same for everyone, regardless of size. You'll get what you need to have a healthy baby.  But your size can make a difference in a few things. You and your doctor will have to watch your pregnancy weight. Your weight may affect your labor and delivery.  You may have some extra doctor visits and tests. And you may have some tests earlier in your pregnancy. You'll need to pay close attention to things like blood pressure and the chance of getting gestational diabetes. (This is a type of diabetes that sometimes happens during pregnancy.) And close attention will be given to your developing baby.  Work with your doctor to get the care you need. Go to all your doctor visits, and follow your doctor's advice about what to do and what to avoid during pregnancy.  How much weight gain is healthy?  If you are very overweight (obese), experts recommend that you gain between 11 and 20 pounds. Your doctor will work with you to set a weight goal that's right for you. In some cases, your doctor may recommend that you not gain any weight.  How much extra food do you need to eat?  Although you may joke that you're \"eating for two\" during pregnancy, you don't need to eat twice as much food. How much you can eat depends " "on:  Your height.  How much you weigh when you get pregnant.  How active you are.  If you're carrying more than one fetus (multiple pregnancy).  In the first trimester, you'll probably need the same amount of calories as you did before you were pregnant. In general, in your second trimester, you need to eat about 340 extra calories a day. In your third trimester, you need to eat about 450 extra calories a day.  What can you do to have a healthy pregnancy?  The best things you can do for you and your baby are to eat healthy foods, get regular exercise, avoid alcohol and smoking, and go to your doctor visits.  Eat a variety of foods from all the food groups. Make sure to get enough calcium and folic acid.  You may want to work with a dietitian to help you plan healthy meals to get the right amount of calories for you.  If you didn't exercise much before you got pregnant, talk to your doctor about how you can slowly get more active. Your doctor may want to set up an exercise program with you.  Where can you learn more?  Go to https://www.Radish [employer].net/patiented  Enter B644 in the search box to learn more about \"Learning About Pregnancy and Obesity.\"  Current as of: July 10, 2023  Content Version: 14.1 2006-2024 [employer].   Care instructions adapted under license by your healthcare professional. If you have questions about a medical condition or this instruction, always ask your healthcare professional. [employer] disclaims any warranty or liability for your use of this information.    You have been provided the CDC Warning Signs in Pregnancy document.    Additional copies can be found here: www.Closetbox.com/414423.pdf  "

## 2024-08-27 NOTE — PROGRESS NOTES
Telephone visit with patient for New Prenatal Intake and Education. This is patient's fourth pregnancy. Handouts reviewed and will be provided at next prenatal appointment. Scheduled for New Prenatal with Dr. Page on 9/10/2024.     LMP 6/12/2024, had 1 day of heavy bleeding 7/13/2024. Dating US ordered.    Prenatal OB Questionnaire  Patient supplied answers from flow sheet for:  Prenatal OB Questionnaire.  Past Medical History  Have you ever recieved care for your mental health? : (!) Yes (10 yrs ago was in therapy)  Have you ever been in a major accident or suffered serious trauma?: No  Within the last year, has anyone hit, slapped, kicked or otherwise hurt you?: No  In the last year, has anyone forced you to have sex when you didn't want to?: No    Past Medical History 2   Have you ever received a blood transfusion?: No  Would you accept a blood transfusion if was medically recommended?: Yes  Does anyone in your home smoke?: No   Is your blood type Rh negative?: No  Have you ever ?: (!) Yes  Have you been hospitalized for a nonsurgical reason excluding normal delivery?: No  Have you ever had an abnormal pap smear?: (!) Yes    Past Medical History (Continued)  Do you have a history of abnormalities of the uterus?: (!) Yes (fibroid)  Did your mother take JULIETTE or any other hormones when she was pregnant with you?: No  Do you have any other problems we have not asked about which you feel may be important to this pregnancy?: No         Allergies as of 8/27/2024:    Allergies as of 08/27/2024 - Reviewed 08/27/2024   Allergen Reaction Noted    Latex Hives 08/27/2024       Current medications are:  Current Outpatient Medications   Medication Sig Dispense Refill    Prenatal Vit-Fe Fumarate-FA (PRENATAL MULTIVITAMIN  PLUS IRON) 27-1 MG TABS Take by mouth daily.      letrozole (FEMARA) 2.5 MG tablet Take 1 tablet (2.5 mg) by mouth daily On cycle days 5-9 (Patient not taking: Reported on 8/27/2024) 5 tablet 0     progesterone (PROMETRIUM) 200 MG capsule Take 1 capsule (200 mg) by mouth daily (Patient not taking: Reported on 6/14/2024) 7 capsule 0

## 2024-08-28 ENCOUNTER — ANCILLARY PROCEDURE (OUTPATIENT)
Dept: ULTRASOUND IMAGING | Facility: CLINIC | Age: 29
End: 2024-08-28
Attending: GENERAL PRACTICE
Payer: MEDICAID

## 2024-08-28 DIAGNOSIS — Z34.80 PRENATAL CARE, SUBSEQUENT PREGNANCY, UNSPECIFIED TRIMESTER: ICD-10-CM

## 2024-08-28 PROCEDURE — 76817 TRANSVAGINAL US OBSTETRIC: CPT | Mod: TC | Performed by: RADIOLOGY

## 2024-08-28 PROCEDURE — 76801 OB US < 14 WKS SINGLE FETUS: CPT | Mod: TC | Performed by: RADIOLOGY

## 2024-09-12 DIAGNOSIS — R82.71 GBS BACTERIURIA: Primary | ICD-10-CM

## 2024-09-26 ENCOUNTER — MYC REFILL (OUTPATIENT)
Dept: OBGYN | Facility: CLINIC | Age: 29
End: 2024-09-26
Payer: MEDICAID

## 2024-09-26 DIAGNOSIS — R82.71 GBS BACTERIURIA: ICD-10-CM

## 2024-09-26 NOTE — TELEPHONE ENCOUNTER
Requested Prescriptions   Pending Prescriptions Disp Refills    amoxicillin-clavulanate (AUGMENTIN) 875-125 MG tablet 10 tablet 0     Sig: Take 1 tablet by mouth 2 times daily.       There is no refill protocol information for this order        Pt last seen 2024 for female infertility- pt currently pregnant at 11w1d,     Last prescribed 2024 for 10 tablets with 0 refills    Pt states she lost 2 pills and now starting to have cramping and some itching. Pt denies vaginal bleeding or burning concerns.    RN routing to provider to advise on incomplete treatment for GBS.    Sherry Salcedo RN on 2024 at 9:10 AM

## 2024-09-27 NOTE — TELEPHONE ENCOUNTER
"Akash Page DO [facility] Triage  Caller: Unspecified (Yesterday,  5:24 AM)  Phone Number: [phone]   \"Please let patient know the antibiotics have been reordered.\"  "

## 2024-10-02 NOTE — PATIENT INSTRUCTIONS
To Schedule an Appointment 24/7  Call: [phone]    If you have any questions regarding your visit, Please contact your care team.  [facility]: [phone]  [facility] CLINIC HOURS TELEPHONE NUMBER   Cephas Agbeh, M.D.      Kamilah Zendejas-Surgery Scheduler  Lissy-Surgery Scheduler       Monday - [place]:    8:00 am-4:45 pm  Tuesday - [place]:   8:00 am-4:45 pm  Friday-[place]:       8:00 am-4:45 pm  Typical Surgery Day:  Wednesday [facility]   [address] [place] [address]   [phone]   Fax [phone]    Imaging Scheduling all locations  [phone]      [facility] Labor and Delivery   [address] [place] [address] [place] [address]   [phone]    [facility]  [address] [facility] [address]  [phone]  Fax [phone]   Urgent Care locations:  [facility] Monday-Friday                               10 am - 8 pm  Saturday and Sunday                      9 am - 5 pm  Monday-Friday                              10 am- 8 pm  Saturday and Sunday                      9 am - 5 pm    [phone]   **Surgeries** Our Surgery Schedulers will contact you to schedule. If you do not receive a call within 3 business days, please call [phone].  If you need a medication refill, please contact your pharmacy. Please allow 3 business days for your refill to be completed.  As always, Thank you for trusting us with your healthcare needs!  see additional instructions from your care team below   Weeks 10 to 14 of Your Pregnancy: Care Instructions  It's now possible to hear the fetus's heartbeat with a special ultrasound device. And the fetus's organs are developing.    Decide about tests to check for birth defects. Think about your age, your chance of passing on a family disease, your need to know about any problems, and what you might do after you  "have the test results.   It's okay to exercise. Try activities such as walking or swimming. Check with your doctor before starting a new program.     You may feel more tired than usual.  Taking naps during the day may help.     You may feel emotional.  It might help to talk to someone.     You may have headaches.  Try lying down and putting a cool cloth over your forehead.     You can use acetaminophen (Tylenol) for pain relief.  Don't take any anti-inflammatory medicines (such as Advil, Motrin, Aleve), unless your doctor says it's okay.     You may feel a fullness or aching in your lower belly.  This can feel like the kind of cramps you might get before a period. A back rub may help.     You may need to urinate more.  Your growing uterus and changing hormones can affect your bladder.     You may feel sick to your stomach (morning sickness).  Try avoiding food and smells that make you feel sick.     Your breasts may feel different.  They may feel tender or get bigger. Your nipples may get darker. Try a bra that gives you good support.     Avoid alcohol, tobacco, and drugs (including marijuana).  If you need help quitting, talk to your doctor.     Take a daily prenatal vitamin.  Choose one with folic acid.   Follow-up care is a key part of your treatment and safety. Be sure to make and go to all appointments, and call your doctor if you are having problems. It's also a good idea to know your test results and keep a list of the medicines you take.  Where can you learn more?  Go to https://www.Strutta.net/patiented  Enter E090 in the search box to learn more about \"Weeks 10 to 14 of Your Pregnancy: Care Instructions.\"  Current as of: July 10, 2023  Content Version: 14.2    2024 [facility] [employer].   Care instructions adapted under license by your healthcare professional. If you have questions about a medical condition or this instruction, always ask your healthcare professional. Healthwise, Incorporated " disclaims any warranty or liability for your use of this information.

## 2024-10-08 ENCOUNTER — PRENATAL OFFICE VISIT (OUTPATIENT)
Dept: OBGYN | Facility: CLINIC | Age: 29
End: 2024-10-08
Payer: COMMERCIAL

## 2024-10-08 VITALS
DIASTOLIC BLOOD PRESSURE: 70 MMHG | HEART RATE: 77 BPM | OXYGEN SATURATION: 97 % | WEIGHT: 274.2 LBS | SYSTOLIC BLOOD PRESSURE: 111 MMHG | BODY MASS INDEX: 41.69 KG/M2

## 2024-10-08 DIAGNOSIS — E66.813 CLASS 3 SEVERE OBESITY DUE TO EXCESS CALORIES IN ADULT, UNSPECIFIED BMI, UNSPECIFIED WHETHER SERIOUS COMORBIDITY PRESENT (H): Primary | ICD-10-CM

## 2024-10-08 DIAGNOSIS — E66.01 CLASS 3 SEVERE OBESITY DUE TO EXCESS CALORIES IN ADULT, UNSPECIFIED BMI, UNSPECIFIED WHETHER SERIOUS COMORBIDITY PRESENT (H): Primary | ICD-10-CM

## 2024-10-08 LAB
EST. AVERAGE GLUCOSE BLD GHB EST-MCNC: 134 MG/DL
HBA1C MFR BLD: 6.3 % (ref 0–5.6)

## 2024-10-08 PROCEDURE — 99207 PR PRENATAL VISIT: CPT | Performed by: OBSTETRICS & GYNECOLOGY

## 2024-10-08 PROCEDURE — 83036 HEMOGLOBIN GLYCOSYLATED A1C: CPT | Performed by: OBSTETRICS & GYNECOLOGY

## 2024-10-08 PROCEDURE — 36415 COLL VENOUS BLD VENIPUNCTURE: CPT | Performed by: OBSTETRICS & GYNECOLOGY

## 2024-10-08 NOTE — PROGRESS NOTES
"12w6d Eating well. . Discussed genetic screening.Myriad and early GTT.  Declined/requesting quad screen.  Vital signs:      BP: 111/70 Pulse: 77     SpO2: 97 %       Weight: 124.4 kg (274 lb 3.2 oz)  Estimated body mass index is 41.69 kg/m  as calculated from the following:    Height as of 6/14/24: 1.727 m (5' 8\").    Weight as of this encounter: 124.4 kg (274 lb 3.2 oz).        ICD-10-CM    1. Class 3 severe obesity due to excess calories in adult, unspecified BMI, unspecified whether serious comorbidity present (H)  E66.813 Glucose tolerance, gest screen, 1 hour    E66.01 Hemoglobin A1c     Myriad Non-Invasive Prenatal Screening-Prequel     Hemoglobin A1c     Myriad Non-Invasive Prenatal Screening-Prequel      2. BMI 40.0-44.9, adult (H)  Z68.41 Glucose tolerance, gest screen, 1 hour     Hemoglobin A1c     Myriad Non-Invasive Prenatal Screening-Prequel     Hemoglobin A1c     Myriad Non-Invasive Prenatal Screening-Prequel        CEPHAS AGBEH, MD.    "

## 2024-10-18 LAB — SCANNED LAB RESULT: NORMAL

## 2024-10-30 NOTE — PATIENT INSTRUCTIONS
To Schedule an Appointment 24/7  Call: [phone]    If you have any questions regarding your visit, Please contact your care team.  [facility]: [phone]  Women [facility] CLINIC HOURS TELEPHONE NUMBER   Cephas Agbeh, M.D.      Kamilah Zendejas-Surgery Scheduler  Lissy-Surgery Scheduler       Monday - [place]:    8:00 am-4:45 pm  Tuesday - [place]:   8:00 am-4:45 pm  Friday-[place]:       8:00 am-4:45 pm  Typical Surgery Day:  Wednesday [facility]   [address] [place] [address]   [phone]   Fax [phone]    Imaging Scheduling all locations  [phone]      [facility] Labor and Delivery   [address] [place] [address] [place] [address]   [phone]    [facility]  [address] [facility] [address]  [phone]  Fax [phone]   Urgent Care locations:  [facility] Monday-Friday                               10 am - 8 pm  Saturday and Sunday                      9 am - 5 pm  Monday-Friday                              10 am- 8 pm  Saturday and Sunday                      9 am - 5 pm    [phone]   **Surgeries** Our Surgery Schedulers will contact you to schedule. If you do not receive a call within 3 business days, please call [phone].  If you need a medication refill, please contact your pharmacy. Please allow 3 business days for your refill to be completed.  As always, Thank you for trusting us with your healthcare needs!  see additional instructions from your care team below   Weeks 14 to 18 of Your Pregnancy: Care Instructions  Around this time, you may start to look pregnant. Your baby is now able to pass urine. And the first stool (meconium) is starting to collect in your baby's intestines. Hair is starting to grow on your baby's head.    You may notice some skin changes, such as itchy spots on your palms or acne on your face.  "  At your next doctor visit, you may have an ultrasound. So you might think about whether you want to know the sex of your baby. Also ask your doctor about flu and COVID-19 shots.      How to reduce stress   Ask for help when you need it.  Try to avoid things that cause you stress.  Seek out things that relieve stress, such as breathing exercises or yoga.     How to get exercise   If you don't usually exercise, start slowly. Short walks may be a good choice.  Try to be active 30 minutes a day, at least 5 days a week.  Avoid activities where you're more likely to fall.  Use light weights to reduce stress on your joints.     How to stay at a healthy weight for you   Talk to your doctor or midwife about how much weight you should gain.  It's generally best to gain:  About 28 to 40 pounds if you're underweight.  About 25 to 35 pounds if you're at a healthy weight.  About 15 to 25 pounds if you're overweight.  About 11 to 20 pounds if you're very overweight (obese).  Follow-up care is a key part of your treatment and safety. Be sure to make and go to all appointments, and call your doctor if you are having problems. It's also a good idea to know your test results and keep a list of the medicines you take.  Where can you learn more?  Go to https://www.[employer].net/patiented  Enter I453 in the search box to learn more about \"Weeks 14 to 18 of Your Pregnancy: Care Instructions.\"  Current as of: July 10, 2023  Content Version: 14.2 2024 [facility] [employer].   Care instructions adapted under license by your healthcare professional. If you have questions about a medical condition or this instruction, always ask your healthcare professional. Healthwise, Incorporated disclaims any warranty or liability for your use of this information.    Second-Trimester Fetal Ultrasound: About This Test  What is it?     Fetal ultrasound is a test that uses sound waves to make pictures of your baby (fetus) and placenta inside the " uterus. The test is the safest way to find out the age, size, and position of your baby. You also may be able to find out the sex of your baby. (But the test isn't done just to find out a baby's sex.)  No known risks to the mother or the baby are linked to fetal ultrasound. But you may feel anxious if the test reveals a problem with your pregnancy or baby.  Why is this test done?  In the second trimester, a fetal ultrasound is done to:  Estimate the number of weeks and days a fetus has developed since the beginning of the pregnancy. This is called the gestational age.  Look at the size and position of the fetus, the placenta, and the fluid that surrounds the fetus.  Find major birth defects, such as heart problems or problems with the brain and spinal cord (neural tube defects). But the test may not be able to find many minor defects and some major birth defects.  How do you prepare for the test?  In general, there's nothing you have to do before this test, unless your doctor tells you to.  How is the test done?  You may be able to leave your clothes on, or you will be given a gown to wear.  You will lie on your back on a padded examination table.  A gel will be spread on your belly. It will be removed after the test.  A small, handheld device called a transducer will be pressed against the gel on your skin and moved across your belly several times.  You may watch the monitor to see the picture of your baby during the test.  What happens after the test?  You will probably be able to go home right away.  You most likely will be able to go back to your usual activities right away.  Follow-up care is a key part of your treatment and safety. Be sure to make and go to all appointments, and call your doctor if you are having problems. It's also a good idea to keep a list of the medicines you take. Ask your doctor when you can expect to have your test results.  Where can you learn more?  Go to  "https://www.[employer].net/patiented  Enter Y671 in the search box to learn more about \"Second-Trimester Fetal Ultrasound: About This Test.\"  Current as of: July 10, 2023  Content Version: 14.2 2024 [employer].   Care instructions adapted under license by your healthcare professional. If you have questions about a medical condition or this instruction, always ask your healthcare professional. Healthwise, Incorporated disclaims any warranty or liability for your use of this information.    Weeks 18 to 22 of Your Pregnancy: Care Instructions  At this stage you may find that your nausea and fatigue are gone. You may feel better overall and have more energy. But you might now also have some new discomforts, like sleep problems or leg cramps.    You may start to feel your baby move. These movements can feel like butterflies or bubbles.   Babies at this stage can now suck their thumbs.     Get some exercise every day.  And avoid caffeine late in the day.     Take a warm shower or bath before bed.  Try relaxation exercises to calm your mind and body.     Use extra pillows.  They can help you get comfortable.     Don't use sleeping pills or alcohol.  They could harm your baby.     For leg cramps, stretch and apply heat.  A warm bath, leg warmers, a heating pad, or a hot water bottle can help with muscle aches.   Stretches for leg cramps    Straighten your leg and bend your foot (flex your ankle) slowly upward, toward your knee. Bend your toes up and down.   Stand on a flat surface. Stretch your toes upward. For balance, hold on to the wall or something stable. If it feels okay, take small steps walking on your heels.   Follow-up care is a key part of your treatment and safety. Be sure to make and go to all appointments, and call your doctor if you are having problems. It's also a good idea to know your test results and keep a list of the medicines you take.  Where can you learn more?  Go to " "https://www.Flypay.net/patiented  Enter W603 in the search box to learn more about \"Weeks 18 to 22 of Your Pregnancy: Care Instructions.\"  Current as of: July 10, 2023  Content Version: 14.2 2024 [facility] [employer], [facility].   Care instructions adapted under license by your healthcare professional. If you have questions about a medical condition or this instruction, always ask your healthcare professional. Healthwise, Incorporated disclaims any warranty or liability for your use of this information.    "

## 2024-11-05 ENCOUNTER — TRANSCRIBE ORDERS (OUTPATIENT)
Dept: MATERNAL FETAL MEDICINE | Facility: CLINIC | Age: 29
End: 2024-11-05

## 2024-11-05 ENCOUNTER — PRENATAL OFFICE VISIT (OUTPATIENT)
Dept: OBGYN | Facility: CLINIC | Age: 29
End: 2024-11-05
Payer: COMMERCIAL

## 2024-11-05 ENCOUNTER — LAB (OUTPATIENT)
Dept: LAB | Facility: CLINIC | Age: 29
End: 2024-11-05
Payer: COMMERCIAL

## 2024-11-05 VITALS
WEIGHT: 272.8 LBS | DIASTOLIC BLOOD PRESSURE: 77 MMHG | SYSTOLIC BLOOD PRESSURE: 118 MMHG | BODY MASS INDEX: 41.48 KG/M2 | HEART RATE: 73 BPM

## 2024-11-05 DIAGNOSIS — E66.01 CLASS 3 SEVERE OBESITY DUE TO EXCESS CALORIES IN ADULT, UNSPECIFIED BMI, UNSPECIFIED WHETHER SERIOUS COMORBIDITY PRESENT (H): Primary | ICD-10-CM

## 2024-11-05 DIAGNOSIS — O24.410 DIET CONTROLLED GESTATIONAL DIABETES MELLITUS (GDM) IN SECOND TRIMESTER: ICD-10-CM

## 2024-11-05 DIAGNOSIS — E66.813 CLASS 3 SEVERE OBESITY DUE TO EXCESS CALORIES IN ADULT, UNSPECIFIED BMI, UNSPECIFIED WHETHER SERIOUS COMORBIDITY PRESENT (H): Primary | ICD-10-CM

## 2024-11-05 DIAGNOSIS — E66.01 CLASS 3 SEVERE OBESITY DUE TO EXCESS CALORIES IN ADULT, UNSPECIFIED BMI, UNSPECIFIED WHETHER SERIOUS COMORBIDITY PRESENT (H): ICD-10-CM

## 2024-11-05 DIAGNOSIS — E66.813 CLASS 3 SEVERE OBESITY DUE TO EXCESS CALORIES IN ADULT, UNSPECIFIED BMI, UNSPECIFIED WHETHER SERIOUS COMORBIDITY PRESENT (H): ICD-10-CM

## 2024-11-05 DIAGNOSIS — O26.90 PREGNANCY RELATED CONDITION, ANTEPARTUM: Primary | ICD-10-CM

## 2024-11-05 LAB — GLUCOSE 1H P 50 G GLC PO SERPL-MCNC: 196 MG/DL (ref 70–129)

## 2024-11-05 PROCEDURE — 99207 PR PRENATAL VISIT: CPT | Performed by: OBSTETRICS & GYNECOLOGY

## 2024-11-05 PROCEDURE — 82950 GLUCOSE TEST: CPT

## 2024-11-05 PROCEDURE — 36415 COLL VENOUS BLD VENIPUNCTURE: CPT
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
## 2024-11-13 ENCOUNTER — TELEPHONE (OUTPATIENT)
Dept: EDUCATION SERVICES | Facility: CLINIC | Age: 29
End: 2024-11-13

## 2024-11-13 ENCOUNTER — VIRTUAL VISIT (OUTPATIENT)
Dept: EDUCATION SERVICES | Facility: CLINIC | Age: 29
End: 2024-11-13
Attending: OBSTETRICS & GYNECOLOGY
Payer: COMMERCIAL

## 2024-11-13 DIAGNOSIS — E66.813 CLASS 3 SEVERE OBESITY DUE TO EXCESS CALORIES IN ADULT, UNSPECIFIED BMI, UNSPECIFIED WHETHER SERIOUS COMORBIDITY PRESENT (H): ICD-10-CM

## 2024-11-13 DIAGNOSIS — O24.410 DIET CONTROLLED GESTATIONAL DIABETES MELLITUS (GDM) IN SECOND TRIMESTER: Primary | ICD-10-CM

## 2024-11-13 DIAGNOSIS — O24.410 DIET CONTROLLED GESTATIONAL DIABETES MELLITUS (GDM) IN SECOND TRIMESTER: ICD-10-CM

## 2024-11-13 DIAGNOSIS — E66.01 CLASS 3 SEVERE OBESITY DUE TO EXCESS CALORIES IN ADULT, UNSPECIFIED BMI, UNSPECIFIED WHETHER SERIOUS COMORBIDITY PRESENT (H): ICD-10-CM

## 2024-11-13 PROCEDURE — G0108 DIAB MANAGE TRN  PER INDIV: HCPCS | Mod: 95 | Performed by: NUTRITIONIST

## 2024-11-13 RX ORDER — LANCETS
EACH MISCELLANEOUS
Qty: 100 EACH | Refills: 6 | Status: SHIPPED | OUTPATIENT
Start: 2024-11-13

## 2024-11-13 NOTE — PATIENT INSTRUCTIONS
"Jose Garrett,    It was nice to meet and speak with you today!    Here is a summary of what we discussed:     Your 1 week follow-up Diabetes Education visit is scheduled on 11/21/24 @ 9:00 AM (video)     1. Check blood sugar 4 times a day, before breakfast and 1 hour after the start of each meal:        Blood glucose goal before breakfast: <95 mg/dL      1 hour after start of meals:  <140 mg/dL      2 hours after the start of meals: <120 mg/dL (please carrie with an \" * \" or \" 2 hr \" so we know if it is a later time)    Video for [employer] monitor:    https://www.[employer]age [employer].com/watch?v=e3Lfl6xl6Wr   (put this in browser to watch)    2. Check ketones in the morning with first urination for one week, if you have negative ketones for one week then reduce testing to once weekly.     Meter supplies should be ready soon (meter, lancets, test strips, ketone strips).  Please call pharmacy if you do not hear that your meter supplies are ready within 1 business day. Please contact us if they are having any problems filling prescription or they are requesting a different meter.     2. Meal and snack carbohydrate recommendations:            Breakfast: 30-45 grams or 2-3 carbohydrate choices      Mid morning snack: 15-30 grams carbohydrate or 1-2 carbohydrate choices      Lunch: 45-60 grams carbohydrate or 3-4 carbohydrate choices      Mid afternoon snack: 15-30 grams carbohydrate or 1-2 carbohydrate choices      Dinner: 45-60 grams carbohydrate or 3-4 carbohydrate choices      Bedtime snack: 15-30 grams carbohydrate or 1-2 carbohydrate choices        *Try to have protein with all meals and snacks      *Non-starchy vegetables are considered \"Frees Foods\"          *If you are seeing elevated blood glucose after meals review the amount of carbohydrate you're eating against the meal plan.         *If blood glucose is still high after a meal, you could try a little less carbohydrate  OR walk after eating for 5-10 minutes if you can " do this safely.      *Carbohydrate sources include:  bread, potatoes/sweet potatoes/yams, pasta, rice, fruit, peas/lentils and beans. You do not want to stop eating these healthy foods, but a smaller portion may help.          *If fasting/before breakfast blood glucose is high in the morning, try to eat a snack before you go to sleep.         *Good to have a mix of carbohydrate and protein      3. Add activity to every day, try walking or being active after each meal to help control blood sugar levels.     4. Call or send a [employer] message to your educator if 3 or more blood sugars are above goal in 1 week, you have an elevated ketone results (trace or higher), or with questions or concerns.       Thank you,    Clara Grimm RD, [facility]  Diabetes Education Triage Line: [phone]  Diabetes Education Appointment [phone]

## 2024-11-13 NOTE — LETTER
"    11/13/2024         RE: Tami Almaraz  [address]  [facility] [address]        Dear Colleague,    Thank you for referring your patient, Taim Almaraz, to the [facility]. Please see a copy of my visit note below.    Diabetes Self-Management Education & Support  Type of service:  Video Visit    If the video visit is dropped, the video visit invitation should be resent by: Text to cell phone: [phone]    Originating Location (pt. Location): Home  Distant Location (provider location): [facility]  Mode of Communication:  Video Conference via Prosetta    Video Start Time:  9:29 AM  Video End Time (time video stopped): 10:09 AM    How would patient like to obtain AVS? MyChart    SUBJECTIVE/OBJECTIVE:  Presents for education related to gestational diabetes.    Accompanied by: Self    Cultural Influences/Ethnic Background:  Choose not to answer    Estimated Date of Delivery: Apr 16, 2025    1 hour OGTT  Lab Results   Component Value Date    GLU1 196 (H) 11/05/2024         3 hour OGTT    Fasting  No results found for: \"GTTGF\"    1 hour  No results found for: \"GTTG1\"    2 hour  No results found for: \"GTTG2\"    3 hour  No results found for: \"GTTG3\"    Lifestyle and Health Behaviors:  Exercise:: Currently not exercising  Barrier to exercise: None  Cultural/Anglican diet restrictions?: No  Meal planning/habits: None  Meals include: Breakfast, Lunch, Dinner  Breakfast: blueberry muffin and hotpocket  Lunch: hotpocket and cucumber  Dinner: bowl of cereal (honey bunches of oats) with lactaid milk and 1 cup hot chocolate  Beverages: Water, Milk    Healthy Coping:  Emotional response to diabetes: Ready to learn, Acceptance  Stage of change: PREPARATION (Decided to change - considering how)    ASSESSMENT:  Tami reports that she was told he had pre-diabetes in October.  She is currently 18w 0d gestation.  Since she was told she had GDM she has " omitted all sugary drinks from diet.   She has family members with diabetes and feels certain she will be figure out how to use monitor and knows to start monitoring as soon as possible both blood glucose levels and ketones.  She has a follow-up in one week and knows to contact [facility] with any questions or concerns in the meantime.      INTERVENTION:  Patient was instructed on Accu-Chek Guide Me meter       Educational topics covered today:  GDM diagnosis, pathophysiology, Risks and Complications of GDM, Means of controlling GDM, Using a Blood Glucose Monitor, Blood Glucose Goals, Logging and Interpreting Glucose Results, Ketone Testing, When to Call a Diabetes Educator or OB Provider, Healthy Eating During Pregnancy, Counting Carbohydrates, Meal Planning for GDM, and Physical Activity    Educational materials provided today: (mailed)  [place] Understanding Gestational Diabetes  GDM Log Book  Sharps Disposal  Care After Delivery      Pt verbalized understanding of concepts discussed and recommendations provided today.     PLAN:  Check glucose 4 times daily, before breakfast and 1 hour after each meal.     Check Ketones daily for one week, if negative, reduce testing to once a week.     Physical activity recommended: walk 10-15 minutes after meals    Meal plan: 30 grams carbs at breakfast, 45-60 grams carbs at lunch, 45-60 grams carbs at supper, 15-30 grams carbs at 3 snacks a day.  Follow consistent CHO meal plan, eat CHO and protein/fat at all meals/snacks.    Call/e-mail/MyChart message diabetes educator if 3 or more blood sugars are above the goal in 1 week, if ketones are positive, or with questions/concerns.    Follow-up:  11/21/24    Clara Grimm RDN, NATHALYN, [facility]   Time Spent: 40 minutes  Encounter Type: Individual    Any diabetes medication dose changes were made via the CDE Protocol and Collaborative Practice Agreement with the patient's referring provider and OB/GYN provider. A copy of this encounter was  shared with the provider.

## 2024-11-13 NOTE — PROGRESS NOTES
"Diabetes Self-Management Education & Support  Type of service:  Video Visit    If the video visit is dropped, the video visit invitation should be resent by: Text to cell phone: [phone]    Originating Location (pt. Location): Home  Distant Location (provider location): [facility]  Mode of Communication:  Video Conference via CloudHashing    Video Start Time:  9:29 AM  Video End Time (time video stopped): 10:09 AM    How would patient like to obtain AVS? MyChart    SUBJECTIVE/OBJECTIVE:  Presents for education related to gestational diabetes.    Accompanied by: Self    Cultural Influences/Ethnic Background:  Choose not to answer    Estimated Date of Delivery: Apr 16, 2025    1 hour OGTT  Lab Results   Component Value Date    GLU1 196 (H) 11/05/2024         3 hour OGTT    Fasting  No results found for: \"GTTGF\"    1 hour  No results found for: \"GTTG1\"    2 hour  No results found for: \"GTTG2\"    3 hour  No results found for: \"GTTG3\"    Lifestyle and Health Behaviors:  Exercise:: Currently not exercising  Barrier to exercise: None  Cultural/Sikhism diet restrictions?: No  Meal planning/habits: None  Meals include: Breakfast, Lunch, Dinner  Breakfast: blueberry muffin and hotpocket  Lunch: hotpocket and cucumber  Dinner: bowl of cereal (honey bunches of oats) with lactaid milk and 1 cup hot chocolate  Beverages: Water, Milk    Healthy Coping:  Emotional response to diabetes: Ready to learn, Acceptance  Stage of change: PREPARATION (Decided to change - considering how)    ASSESSMENT:  Tami reports that she was told he had pre-diabetes in October.  She is currently 18w 0d gestation.  Since she was told she had GDM she has omitted all sugary drinks from diet.   She has family members with diabetes and feels certain she will be figure out how to use monitor and knows to start monitoring as soon as possible both blood glucose levels and ketones.  She has a follow-up in one week and " knows to contact [facility] with any questions or concerns in the meantime.      INTERVENTION:  Patient was instructed on Accu-Chek Guide Me meter       Educational topics covered today:  GDM diagnosis, pathophysiology, Risks and Complications of GDM, Means of controlling GDM, Using a Blood Glucose Monitor, Blood Glucose Goals, Logging and Interpreting Glucose Results, Ketone Testing, When to Call a Diabetes Educator or OB Provider, Healthy Eating During Pregnancy, Counting Carbohydrates, Meal Planning for GDM, and Physical Activity    Educational materials provided today: (mailed)  [place]ee Understanding Gestational Diabetes  GDM Log Book  Sharps Disposal  Care After Delivery      Pt verbalized understanding of concepts discussed and recommendations provided today.     PLAN:  Check glucose 4 times daily, before breakfast and 1 hour after each meal.     Check Ketones daily for one week, if negative, reduce testing to once a week.     Physical activity recommended: walk 10-15 minutes after meals    Meal plan: 30 grams carbs at breakfast, 45-60 grams carbs at lunch, 45-60 grams carbs at supper, 15-30 grams carbs at 3 snacks a day.  Follow consistent CHO meal plan, eat CHO and protein/fat at all meals/snacks.    Call/e-mail/MyChart message diabetes educator if 3 or more blood sugars are above the goal in 1 week, if ketones are positive, or with questions/concerns.    Follow-up:  11/21/24    Clara Grimm RDN, JAIME, [facility]   Time Spent: 40 minutes  Encounter Type: Individual    Any diabetes medication dose changes were made via the CDE Protocol and Collaborative Practice Agreement with the patient's referring provider and OB/GYN provider. A copy of this encounter was shared with the provider.

## 2024-11-21 ENCOUNTER — VIRTUAL VISIT (OUTPATIENT)
Dept: EDUCATION SERVICES | Facility: CLINIC | Age: 29
End: 2024-11-21
Payer: COMMERCIAL

## 2024-11-21 DIAGNOSIS — O24.410 DIET CONTROLLED GESTATIONAL DIABETES MELLITUS (GDM) IN SECOND TRIMESTER: Primary | ICD-10-CM

## 2024-11-21 NOTE — PROGRESS NOTES
Diabetes Self-Management Education & Support  Type of service:  Video Visit    If the video visit is dropped, the video visit invitation should be resent by: Text to cell phone: [phone]    Originating Location (pt. Location): Home  Distant Location (provider location): Offsite  Mode of Communication:  Video Conference via SmarterShade    Video Start Time:  9am  Video End Time (time video stopped): 9:35am    How would patient like to obtain AVS? MyChart    SUBJECTIVE/OBJECTIVE:  Presents for education related to gestational diabetes.    Accompanied by: Self  Diabetes management related comments/concerns: has not started testing, is scared of the poke  Gestational weeks: 19w1d  Next OB Visit Date: 12/03/24    Cultural Influences/Ethnic Background:  Choose not to answer      LMP 06/12/2024       Estimated Date of Delivery: Apr 16, 2025    Blood Glucose/Ketone Log: Has not started testing.  Is scared of the finger poke.  Asking if there is another way to test blood sugars.    Lifestyle and Health Behaviors:  Exercise:: Currently not exercising  Cultural/Presybeterian diet restrictions?: No  Meal planning/habits: None  Breakfast: packaged muffin (27 gm), fruit snacks (17 gm)  Lunch: ribs, 1 cup rice, snapple zero sugar  Dinner: hot pocket  Snacks: fruit snacks OR cucumber, carrots  Beverages: Other (see Comments)  Biggest challenges to healthy eating: Other  Experiencing nausea?: Yes    Healthy Coping:  Emotional response to diabetes: Ready to learn  Stage of change: PREPARATION (Decided to change - considering how)    Current Management:  Taking medications for gestational diabetes?: No    ASSESSMENT:  Ketones: moderate.   Fasting blood glucoses: na% in target.  After breakfast: na% in target.  After lunch: na% in target.  After dinner: na% in target.    Provided repeat education on glucose monitoring, including proper technique and glucose targets.  Will send a request to pharmacy liaison team to look into cgm  coverage.  Patient states she may be able to have her significant other help with testing.  Encouraged patient to follow-up early next with glucose data.    Reviewed GDM meal plan.  Patient is not currently eating snacks between meals.  Provided ideas.    INTERVENTION:  Educational topics covered today:  What to expect after delivery, Future testing for Type 2 diabetes (2 hour OGTT at 6 week post-partum check-up and annual fasting blood glucose level), Risk of GDM and planning ahead for future pregnancies, Recommended lifestyle interventions for reducing the risk of Type 2 Diabetes, When to Call a Diabetes Educator or OB Provider    Educational Materials provided today:  [facility] Preventing Diabetes    PLAN:  Check glucose 4 times daily.  Check ketones once a week when readings are consistently negative.  Continue with recommended physical activity.  Continue to follow recommended meal plan: 2-3 carbs at breakfast, 3-4 carbs at lunch, 3-4 carbs at supper, 1-2 carbs at snacks.  Follow consistent CHO meal plan, eat CHO and protein/fat at all meals/snacks.    Call/e-mail/MyChart message diabetes educator if 3 or more blood sugars are above the goal in 1 week or if ketones are positive.    RIO Sheldon [facility]    Time Spent: 30 minutes  Encounter Type: Individual    Any diabetes medication dose changes were made via the CDE Protocol and Collaborative Practice Agreement with the patient's OB/GYN provider. A copy of this encounter was shared with the provider.

## 2024-11-21 NOTE — PATIENT INSTRUCTIONS
Follow up with diabetes education for blood glucose and ketone review on 12/2    Plan to share your glucose and ketone information with diabetes education once a week, unless otherwise directed.     1. Check glucose 4 times daily, before breakfast daily and 1 hour after each meal, as recommended.    2. Check ketones daily or once a week after they have been negative for 7 days in a row. If ketones are elevated, let your diabetes educator know and continue to check daily until they are negative for 7 days in a row.    3. Continue with recommended physical activity.    4. Continue to follow recommended meal plan: 2-3 carbs at breakfast, 3-4 carbs at lunch, 3-4 carbs at supper, 1-2 carbs at snacks.  Follow consistent CHO meal plan, eat CHO and protein/fat at all meals/snacks.    5. Follow-up with OB doctor as recommended.    6. Call or MyChart message your diabetes educator if 3 or more blood sugars are above the goal in 1 week or if ketones are elevated (trace or above).       AFTER YOU DELIVER:  - Continue with healthy eating and physical activity to get back to your pre-pregnancy weight.   - Have a follow-up 2-hour Glucose Tolerance Test at your 6-week post-partum check-up.   - Have your fasting blood sugar checked once a year.  - Plan ahead for future pregnancies - eat healthy, keep active, work with your doctor to check for gestational diabetes early on in the pregnancy and check blood sugars as recommended by your doctor.

## 2024-11-21 NOTE — LETTER
11/21/2024         RE: Tami Almaraz  [address]  [facility] [address]        Dear Colleague,    Thank you for referring your patient, Tami Almaraz, to the [facility]. Please see a copy of my visit note below.    Diabetes Self-Management Education & Support  Type of service:  Video Visit    If the video visit is dropped, the video visit invitation should be resent by: Text to cell phone: [phone]    Originating Location (pt. Location): Home  Distant Location (provider location): Offsite  Mode of Communication:  Video Conference via [employer] Start Time:  9am  Video End Time (time video stopped): 9:35am    How would patient like to obtain AVS? Zaheerhart    SUBJECTIVE/OBJECTIVE:  Presents for education related to gestational diabetes.    Accompanied by: Self  Diabetes management related comments/concerns: has not started testing, is scared of the poke  Gestational weeks: 19w1d  Next OB Visit Date: 12/03/24    Cultural Influences/Ethnic Background:  Choose not to answer      LMP 06/12/2024       Estimated Date of Delivery: Apr 16, 2025    Blood Glucose/Ketone Log: Has not started testing.  Is scared of the finger poke.  Asking if there is another way to test blood sugars.    Lifestyle and Health Behaviors:  Exercise:: Currently not exercising  Cultural/Restorationist diet restrictions?: No  Meal planning/habits: None  Breakfast: packaged muffin (27 gm), fruit snacks (17 gm)  Lunch: ribs, 1 cup rice, snapple zero sugar  Dinner: hot pocket  Snacks: fruit snacks OR cucumber, carrots  Beverages: Other (see Comments)  Biggest challenges to healthy eating: Other  Experiencing nausea?: Yes    Healthy Coping:  Emotional response to diabetes: Ready to learn  Stage of change: PREPARATION (Decided to change - considering how)    Current Management:  Taking medications for gestational diabetes?: No    ASSESSMENT:  Ketones: moderate.   Fasting blood glucoses: na% in  target.  After breakfast: na% in target.  After lunch: na% in target.  After dinner: na% in target.    Provided repeat education on glucose monitoring, including proper technique and glucose targets.  Will send a request to pharmacy liaison team to look into cgm coverage.  Patient states she may be able to have her significant other help with testing.  Encouraged patient to follow-up early next with glucose data.    Reviewed GDM meal plan.  Patient is not currently eating snacks between meals.  Provided ideas.    INTERVENTION:  Educational topics covered today:  What to expect after delivery, Future testing for Type 2 diabetes (2 hour OGTT at 6 week post-partum check-up and annual fasting blood glucose level), Risk of GDM and planning ahead for future pregnancies, Recommended lifestyle interventions for reducing the risk of Type 2 Diabetes, When to Call a Diabetes Educator or OB Provider    Educational Materials provided today:  [facility] Preventing Diabetes    PLAN:  Check glucose 4 times daily.  Check ketones once a week when readings are consistently negative.  Continue with recommended physical activity.  Continue to follow recommended meal plan: 2-3 carbs at breakfast, 3-4 carbs at lunch, 3-4 carbs at supper, 1-2 carbs at snacks.  Follow consistent CHO meal plan, eat CHO and protein/fat at all meals/snacks.    Call/e-mail/MyChart message diabetes educator if 3 or more blood sugars are above the goal in 1 week or if ketones are positive.    RIO Sheldon    Time Spent: 30 minutes  Encounter Type: Individual    Any diabetes medication dose changes were made via the CDE Protocol and Collaborative Practice Agreement with the patient's OB/GYN provider. A copy of this encounter was shared with the provider.

## 2024-12-01 ENCOUNTER — HEALTH MAINTENANCE LETTER (OUTPATIENT)
Age: 29
End: 2024-12-01

## 2024-12-02 ENCOUNTER — VIRTUAL VISIT (OUTPATIENT)
Dept: EDUCATION SERVICES | Facility: CLINIC | Age: 29
End: 2024-12-02
Payer: COMMERCIAL

## 2024-12-02 DIAGNOSIS — O24.410 DIET CONTROLLED GESTATIONAL DIABETES MELLITUS (GDM) IN SECOND TRIMESTER: Primary | ICD-10-CM

## 2024-12-02 PROCEDURE — G0108 DIAB MANAGE TRN  PER INDIV: HCPCS | Mod: 95 | Performed by: DIETITIAN, REGISTERED

## 2024-12-02 NOTE — PATIENT INSTRUCTIONS
"Follow-up with [facility] on 12/5 @ 1pm  1. Check glucose 4 times daily, before breakfast daily and 1 hour after each meal, or as recommended.  Blood glucose goal before breakfast: <95 mg/dL  1 hour after start of meals:  <140 mg/dL OR  2 hours after start of meal: <120mg/dL (can do this if you forget 1 hour check)     -Lancets should be placed in a sharps container or you can use a laundry container, do not throw lancets in the trash.  If using laundry container, once mostly full, can duct-tape the lid closed, label \"Sharps-do not recycle\" and then place in trash. You can call your [employer] service to find appropriate drop off sites for lancets.    2. Check ketones daily or once a week after they have been negative for 7 days in a row. If ketones are elevated, let your diabetes educator know and continue to check daily until they are negative for 7 days in a row.    3. Continue with recommended physical activity.    4. Continue to follow recommended meal plan: 30-45g carbs at breakfast, 45-60g carbs at lunch, 45-60g carbs at supper, 15-30g carbs at snacks.  Follow consistent CHO meal plan, eat CHO and protein/fat at all meals/snacks.  -2 pieces of toast or peanut butter OR  - 2 cups of milk (8oz each) OR  -1 piece of toast + 1 cup milk OR  -English muffin + nuts or peanut butter    Protein/Fat list (about 14 grams of protein or 2 fat servings) Carbohydrate list (about 30 grams of carbohydrate)   2 slices of cheese English Muffin   2 TBS Peanut butter/[place] butter/Sun butter 10 crackers     cup Cottage cheese 2% 2 pieces of toast   2 oz any cooked meat - less than deck of cards size 1 hamburger or hot dog bun   1.5 oz of soy nuts 1 whole wheat lyndsey   Avocado or guacamole 6 karla cracker squares     cup tuna - can add mayonnaise 1 cup full fat ice cream - no candy or sauce   2 eggs - boiled, poached, fried, scrambled, omelet 30 chips   1 veggie shameka (might have carbohydrates also) Greek yogurt - 30 grams of carbohydrate "   2 TBS Coconut 2 - 6 inch tortillas corn or flour   12 shrimp - not breaded 2 toaster waffles - no syrup   2-1oz meatballs   bagel   2 Tbs cream cheese - plain, veggie, salmon - no fruit or honey. 6 cups popcorn - unsweetened     cup of nuts Granola bar of 3o grams of carbohydrate   10 olives 1 cup of unsweetened lentils or beans.    Tofu or Temph 4-5oz 1 cup potato salad     You can always add vegetables with dip, salad dressing or salsa also.       5. Follow-up with OB doctor as recommended.    6. Call or MyChart message your diabetes educator if 3 or more blood sugars are above the goal in 1 week or if ketones are elevated (small or larger).     After Delivery:    Check blood sugar 4 times per week to be sure that the numbers have gone back to normal after baby is born. Check blood sugar before breakfast or 2 hours after the start of a meal.     Blood sugar goals are different when you are not pregnant:    Before breakfast: Less than 100  2 hours after a meal: Less than 140    If you have elevated numbers, contact your OB/GYN or primary care provider.    2. Have a 2-hour glucose tolerance test done at your post-partum check-up.    3. Continue with healthy eating and physical activity to get back to your pre-pregnancy weight.     4.  Have your fasting blood sugar checked once a year.    5. Plan ahead for future pregnancies - eat healthy, keep active, work with your doctor to check for gestational diabetes early on in the pregnancy and check blood sugars as recommended by your doctor.     [facility]:  For Your Diabetes Education or Nutrition Appointments Call:  [phone]   For Diabetes Education and Nutrition Related Questions:   Phone: [phone]  Send MyChart Message   If you need a medication refill please contact your pharmacy. Please allow 3 business days for your refills to be completed.

## 2024-12-02 NOTE — PROGRESS NOTES
Gestational Diabetes Follow-up    Subjective/Objective:    Tami Almaraz here for BG review.  Last date of communication was: 11/26.    Gestational diabetes is being managed with diet and activity    Taking diabetes medications: no    Estimated Date of Delivery: Apr 16, 2025    BG/Food Log:   Date Breakfast  Lunch  Dinner  Bedtime    Before After Before After Before After    11/26  118  132  138    11/27 100 132  120  149    11/28 100 122  139  170  *thanksgiving    11/29 98 129  129  129    11/30 99   118  150*thanksgiving food    12/1 98 142*ate candy w/ breakfast  131  128    12/2 109*hot cocoa before bed 139          Bedtime snack @ 11-12 : beef jerky with hot cocoa OR Chipotle small amounts. And checking BG at 5AM.      Assessment: Fasting BG elevated, pt notes she eats some of her significant others food when they get home around 11pm and then checks BG at 5am. We discussed moving snack to 8-9pm and not eating with SO. Provided examples of bedtime snacks. Her post-meal BG are mostly at goals with some elevations where she knows the reason for it. We made follow-up appt with [facility] on Thursday. I will send her the video link for how to use insulin in case it is needed. She is going to  [facility].    Ketones: trace.   Fasting blood glucoses: 0% in target.  After breakfast: 83% in target.  Before lunch: -% in target.  After lunch: 100% in target.  Before dinner: -% in target.  After dinner: 50% in target.    Plan/Response:  Follow-up in 3-4 days.    Nunu Swan RD, NATHALY, [facility]      30 minutes spent with patient video visit    Any diabetes medication dose changes were made via the CDE Protocol and Collaborative Practice Agreement with the patient's OB/GYN provider. A copy of this encounter was shared with the provider.

## 2024-12-02 NOTE — Clinical Note
Fasting BG elevated, she is not having a full 8 hour fast, more like 5-6 hours. We discussed eating bedtime snack earlier. She will follow-up with [facility] on Thursday to decide if insulin is needed. I am going to send her a video on how to use insulin if needed. Nunu Swan RD, LD, [facility]

## 2024-12-02 NOTE — LETTER
12/2/2024         RE: Tami Almaraz  [address]  [facility] [address]        Dear Colleague,    Thank you for referring your patient, Tami Almaraz, to the [facility]. Please see a copy of my visit note below.    Gestational Diabetes Follow-up    Subjective/Objective:    Tami Almaraz sent in blood glucose log for review. Last date of communication was: 11/26.    Gestational diabetes is being managed with diet and activity    Taking diabetes medications: no    Estimated Date of Delivery: Apr 16, 2025    BG/Food Log:   Date Breakfast  Lunch  Dinner  Bedtime    Before After Before After Before After    11/26  118  132  138    11/27 100 132  120  149    11/28 100 122  139  170  *thanksgiving    11/29 98 129  129  129    11/30 99   118  150*thanksgiving food    12/1 98 142*ate candy w/ breakfast  131  128    12/2 109*hot cocoa before bed 139          Bedtime snack @ 11-12 : beef jerky with hot cocoa OR Chipotle small amounts. And checking BG at 5AM.      Assessment: Fasting BG elevated, pt notes she eats some of her significant others food when they get home around 11pm and then checks BG at 5am. We discussed moving snack to 8-9pm and not eating with SO. Provided examples of bedtime snacks. Her post-meal BG are mostly at goals with some elevations where she knows the reason for it. We made follow-up appt with Slime on Thursday. I will send her the video link for how to use insulin in case it is needed. She is going to  Courtney 3.    Ketones: trace.   Fasting blood glucoses: 0% in target.  After breakfast: 83% in target.  Before lunch: -% in target.  After lunch: 100% in target.  Before dinner: -% in target.  After dinner: 50% in target.    Plan/Response:  Follow-up in 3-4 days.    Nunu Swan RD, NATHALY, CDCES        Any diabetes medication dose changes were made via the CDE Protocol and Collaborative Practice Agreement with the patient's OB/GYN provider. A copy of this  encounter was shared with the provider.

## 2024-12-03 ENCOUNTER — PRENATAL OFFICE VISIT (OUTPATIENT)
Dept: OBGYN | Facility: CLINIC | Age: 29
End: 2024-12-03
Payer: COMMERCIAL

## 2024-12-03 ENCOUNTER — PRE VISIT (OUTPATIENT)
Dept: MATERNAL FETAL MEDICINE | Facility: CLINIC | Age: 29
End: 2024-12-03

## 2024-12-03 VITALS
DIASTOLIC BLOOD PRESSURE: 73 MMHG | WEIGHT: 277.2 LBS | HEART RATE: 68 BPM | OXYGEN SATURATION: 98 % | BODY MASS INDEX: 42.15 KG/M2 | SYSTOLIC BLOOD PRESSURE: 110 MMHG

## 2024-12-03 DIAGNOSIS — E66.01 CLASS 3 SEVERE OBESITY DUE TO EXCESS CALORIES IN ADULT, UNSPECIFIED BMI, UNSPECIFIED WHETHER SERIOUS COMORBIDITY PRESENT (H): Primary | ICD-10-CM

## 2024-12-03 DIAGNOSIS — O24.410 DIET CONTROLLED GESTATIONAL DIABETES MELLITUS (GDM) IN SECOND TRIMESTER: ICD-10-CM

## 2024-12-03 DIAGNOSIS — E66.813 CLASS 3 SEVERE OBESITY DUE TO EXCESS CALORIES IN ADULT, UNSPECIFIED BMI, UNSPECIFIED WHETHER SERIOUS COMORBIDITY PRESENT (H): Primary | ICD-10-CM

## 2024-12-03 PROCEDURE — 99207 PR PRENATAL VISIT: CPT | Performed by: OBSTETRICS & GYNECOLOGY

## 2024-12-03 NOTE — PROGRESS NOTES
"20w6d. Doing well without issues/concerns.  Good fetal movement.  She has seen the diabetic educator and blood sugars have been monitored.  She has a follow-up appointment with diabetic educator this week.  She has a follow-up appointment for MFM appointment next week.  Routine anticipatory guidance.    Vital signs:      BP: 110/73 Pulse: 68     SpO2: 98 %       Weight: 125.7 kg (277 lb 3.2 oz)  Estimated body mass index is 42.15 kg/m  as calculated from the following:    Height as of 6/14/24: 1.727 m (5' 8\").    Weight as of this encounter: 125.7 kg (277 lb 3.2 oz).        ICD-10-CM    1. Class 3 severe obesity due to excess calories in adult, unspecified BMI, unspecified whether serious comorbidity present (H)  E66.813     E66.01       2. Diet controlled gestational diabetes mellitus (GDM) in second trimester  O24.410       3. BMI 40.0-44.9, adult (H)  Z68.41         CEPHAS AGBEH, MD.    "

## 2024-12-03 NOTE — PATIENT INSTRUCTIONS
To Schedule an Appointment 24/7  Call: [phone]    If you have any questions regarding your visit, Please contact your care team.  [facility]: [phone]  [facility] CLINIC HOURS TELEPHONE NUMBER   Cephas Agbeh, M.D.      Kamilah Zendejas-Surgery Scheduler  Lissy-Surgery Scheduler       Monday - [place]:    8:00 am-4:45 pm  Tuesday - [place]:   8:00 am-4:45 pm  Friday-[place]:       8:00 am-4:45 pm  Typical Surgery Day:  Wednesday [facility]   [address] [place] [address]   [phone]   Fax [phone]    Imaging Scheduling all locations  [phone]      [facility] Delivery   [address] [place] [address] [place] [address]   [phone]    [facility]  [address] [facility] [address]  [phone]  Fax [phone]   Urgent Care locations:  [facility] Monday-Friday                               10 am - 8 pm  Saturday and Sunday                      9 am - 5 pm  Monday-Friday                              10 am- 8 pm  Saturday and Sunday                      9 am - 5 pm    [phone]   **Surgeries** Our Surgery Schedulers will contact you to schedule. If you do not receive a call within 3 business days, please call [phone].  If you need a medication refill, please contact your pharmacy. Please allow 3 business days for your refill to be completed.  As always, Thank you for trusting us with your healthcare needs!  see additional instructions from your care team below   Weeks 18 to 22 of Your Pregnancy: Care Instructions  At this stage you may find that your nausea and fatigue are gone. You may feel better overall and have more energy. But you might now also have some new discomforts, like sleep problems or leg cramps.    You may start to feel your baby move. These movements can feel like butterflies or bubbles.   Babies at  "this stage can now suck their thumbs.     Get some exercise every day.  And avoid caffeine late in the day.     Take a warm shower or bath before bed.  Try relaxation exercises to calm your mind and body.     Use extra pillows.  They can help you get comfortable.     Don't use sleeping pills or alcohol.  They could harm your baby.     For leg cramps, stretch and apply heat.  A warm bath, leg warmers, a heating pad, or a hot water bottle can help with muscle aches.   Stretches for leg cramps    Straighten your leg and bend your foot (flex your ankle) slowly upward, toward your knee. Bend your toes up and down.   Stand on a flat surface. Stretch your toes upward. For balance, hold on to the wall or something stable. If it feels okay, take small steps walking on your heels.   Follow-up care is a key part of your treatment and safety. Be sure to make and go to all appointments, and call your doctor if you are having problems. It's also a good idea to know your test results and keep a list of the medicines you take.  Where can you learn more?  Go to https://www.[employer].net/patiented  Enter W603 in the search box to learn more about \"Weeks 18 to 22 of Your Pregnancy: Care Instructions.\"  Current as of: July 10, 2023  Content Version: 14.2 2024 [employer].   Care instructions adapted under license by your healthcare professional. If you have questions about a medical condition or this instruction, always ask your healthcare professional. [employer]wise, Incorporated disclaims any warranty or liability for your use of this information.    Weeks 22 to 26 of Your Pregnancy: Care Instructions  Your baby's lungs are getting ready for breathing. Your baby may respond to your voice. Your baby likely turns less, and kicks or jerks more. Jerking may mean that your baby has hiccups.    Think about taking childbirth classes. And start to think about whether you want to have pain medicine during labor.   At your next " "doctor visit, you may be tested for anemia and for high blood sugar that first occurs during pregnancy (gestational diabetes). These conditions can cause problems for you and your baby.         To ease discomfort, such as back pain   Change your position often. Try not to sit or stand for too long.  Get some exercise. Things like walking or stretching may help.  Try using a heating pad or cold pack.        To ease or reduce swelling in your feet, ankles, hands, and fingers   Take off your rings.  Avoid high-sodium foods, such as potato chips.  Prop up your feet, and sleep with pillows under your feet.  Try to avoid standing for long periods of time.  Do not wear tight shoes.  Wear support stockings.  Kegel exercises to prevent urine from leaking    Squeeze your muscles as if you were trying not to pass gas. Your belly, legs, and buttocks shouldn't move. Hold the squeeze for 3 seconds, then relax for 5 to 10 seconds.    Add 1 second each week until you can squeeze for 10 seconds. Repeat the exercise 10 times a session. Do 3 to 8 sessions a day. If these exercises cause you pain, stop doing them and talk with your doctor.  Follow-up care is a key part of your treatment and safety. Be sure to make and go to all appointments, and call your doctor if you are having problems. It's also a good idea to know your test results and keep a list of the medicines you take.  Where can you learn more?  Go to https://www.[employer].net/patiented  Enter G264 in the search box to learn more about \"Weeks 22 to 26 of Your Pregnancy: Care Instructions.\"  Current as of: July 10, 2023  Content Version: 14.2 2024 [facility] [place].   Care instructions adapted under license by your healthcare professional. If you have questions about a medical condition or this instruction, always ask your healthcare professional. Healthwise, Incorporated disclaims any warranty or liability for your use of this information.    "

## 2024-12-05 ENCOUNTER — VIRTUAL VISIT (OUTPATIENT)
Dept: EDUCATION SERVICES | Facility: CLINIC | Age: 29
End: 2024-12-05
Payer: COMMERCIAL

## 2024-12-05 DIAGNOSIS — O24.410 DIET CONTROLLED GESTATIONAL DIABETES MELLITUS (GDM) IN SECOND TRIMESTER: Primary | ICD-10-CM

## 2024-12-05 NOTE — LETTER
12/5/2024         RE: Tami Almaraz  [address]  [facility] [address]        Dear Colleague,    Thank you for referring your patient, Tami Almaraz, to the [facility]. Please see a copy of my visit note below.    Diabetes and Pregnancy Follow-up  Type of Service: Telephone Visit/ 31 minutes     Originating Location (Patient Location): [place]  Distant Location (Provider Location): [place] [facility]  Mode of Communication:  Telephone     Telephone Visit Start Time: 1:07 PM  Telephone Visit End Time (telephone visit stop time): 1:38 PM     How would patient like to obtain AVS? MyChart    Subjective/Objective:    Tami Almaraz was called for a scheduled BG review. Last date of communication was: 12/2/24.    Gestational diabetes is being managed with diet and activity    Taking diabetes medications: no    Estimated Date of Delivery: Apr 16, 2025    Blood Glucose/Ketone Log:    Date Ketones Fasting Post Breakfast Post Lunch Post Supper   12/3   118 (1 hour after 2 cookies, apple juice, sambusa)  121   12/4  99 139 122 142   12/5  111      (Testing 1 hour post meals)    Assessment:  Ketones: not addressed today.   Fasting blood glucoses: 0% in target.  After breakfast: 100% in target.  Before lunch: n/a% in target.  After lunch: 100% in target.  Before dinner: n/a% in target.  After dinner: 50% in target.    Patient misunderstood the direction given at appointment on 12/2/24 with NA colleague.  Reports for each of the last two evenings, she has stopped eating around 8 PM and then waited until 9-10:30 AM to test her fasting glucose.  She now understands she should have her HS snack, containing both carbohydrate + protein, about 8-9 PM, only have something that does not contain carbohydrates later in the evening (when her  eats upon his arrival home from work), and then check her fasting glucose upon awaking for her day around 5 AM.  She should have breakfast within the hour of  waking for the day.  Discussed how this long interval between HS snack and checking fasting glucose, 12-13+ hours each of the past 2 days, can cause an elevated fasting glucose.  She agrees to work on this.  Provided suggestions for HS snack.  Reviewed recommended meal plan.  States she plans to start using the Courtney 3 sensor tonight as her sister is coming to assist her in placing it.  Discussed she will either need to write down OR document in the vidal her fasting and post meal glucoses. Phone follow up with writer scheduled for 12/9.     Plan/Response:  Test glucose 4 times per day:   Fasting (when you first awake for the day, around 5 AM): 95 mg/dL or below   1 hour after breakfast: 140 mg/dL or below   1 hour after lunch: 140 mg/dL or below   1 hour after dinner: 140 mg/dL or below     Please bring your meter and log book to all appointments     If you miss 1 hour after meal test, test 2 hours after the meal.  Goal 2 hours after is 120 mg/dL or below.     2.  Check your urine ketones once a day, when you first awake for the day until they are negative to trace for 7 days in a row.  Then decrease and check once a week.     3.  Meal Plan    Breakfast (5-6 AM): 30 grams carbohydrate + protein   Snack (8-9 AM): 15-30 grams carbohydrate + protein  Lunch (11 AM - 12 PM): 45-60 grams carbohydrate + protein  Snack (2-3 PM): 15-30 grams carbohydrate + protein  Dinner (5-6 PM): 45-60 grams carbohydrate + protein  Snack(8-9 PM): 15-30 grams carbohydrate + protein  Later, just have water, sparkling water; something without carbohydrate    A few tips:   -consume some carbohydrate every 2-3 hours while awake: times above are approximate and meant to provide an idea of what the meal plan looks like in practice   -you need a minimum of 175 grams of carbohydrate per day   -fruit and cold breakfast cereal are best tolerated at lunch or later   -protein includes: cheese, eggs, fish, nuts, nut butter, chicken, turkey, beef, and  pork   -snack ideas: an individual container of Greek yogurt (try Chobani Less Sugar), whole grain crackers and cheese, chocolate fairlife milk, a Kashi or KIND bar, a baseball size piece of whole fruit + nut butter (apple + peanut butter), fruit canned in it's own juice + cottage cheese    4.  Aim for 20-30 minutes of activity most days of the week (with the okay of your OB provider).      5.  Start using the Freestyle Courtney 3:   Documenting glucoses in the vidal:  -Please type in your fasting sensor glucose every morning using the notes feature in your vidal.  For example: F 94 for fasting reading of 94 mg/dL.  Remember, the goal is less than 95 for morning fasting.    How to do this: click the ADD NOTE button at the bottom of your home screen, then in the comments section at the bottom, type in  F 94 , then touch DONE.    -Set your timer for 1 hour when you start eating your meal.  For example, 1B 123 for 1 hour after breakfast reading of 123 mg/dL.  Remember, 1 hour after a meal the goal is 140 mg/dL or less.     How to do this: click the ADD NOTE button at the bottom of your home screen, then in the comments section at the bottom, type in  1B 123 , then touch DONE    Sensor glucose goals for pregnancy:  The goal sensor glucose target range for pregnancy is  mg/dL    6.  Follow up: with Slime via phone on Monday, 12/9/24 at 9 AM.     7.  Call Diabetes Education at [phone] or send a [employer] message with:   -questions or concerns   -ketones that are small, moderate, or large   -3 or more blood sugars above target in a 7 day period    Slime Kramer, MPH, RD, CDCES, LD 12/5/2024    Time Spent: 31 minutes    Any diabetes medication dose changes were made via the CDE Protocol and Collaborative Practice Agreement with the patient's referring provider. A copy of this encounter was shared with the provider.

## 2024-12-05 NOTE — PATIENT INSTRUCTIONS
Test glucose 4 times per day:   Fasting (when you first awake for the day, around 5 AM): 95 mg/dL or below   1 hour after breakfast: 140 mg/dL or below   1 hour after lunch: 140 mg/dL or below   1 hour after dinner: 140 mg/dL or below     Please bring your meter and log book to all appointments     If you miss 1 hour after meal test, test 2 hours after the meal.  Goal 2 hours after is 120 mg/dL or below.     2.  Check your urine ketones once a day, when you first awake for the day until they are negative to trace for 7 days in a row.  Then decrease and check once a week.     3.  Meal Plan    Breakfast (5-6 AM): 30 grams carbohydrate + protein   Snack (8-9 AM): 15-30 grams carbohydrate + protein  Lunch (11 AM - 12 PM): 45-60 grams carbohydrate + protein  Snack (2-3 PM): 15-30 grams carbohydrate + protein  Dinner (5-6 PM): 45-60 grams carbohydrate + protein  Snack(8-9 PM): 15-30 grams carbohydrate + protein  Later, just have water, sparkling water; something without carbohydrate    A few tips:   -consume some carbohydrate every 2-3 hours while awake: times above are approximate and meant to provide an idea of what the meal plan looks like in practice   -you need a minimum of 175 grams of carbohydrate per day   -fruit and cold breakfast cereal are best tolerated at lunch or later   -protein includes: cheese, eggs, fish, nuts, nut butter, chicken, turkey, beef, and pork   -snack ideas: an individual container of Greek yogurt (try Chobani Less Sugar), whole grain crackers and cheese, chocolate fairlife milk, a Kashi or KIND bar, a baseball size piece of whole fruit + nut butter (apple + peanut butter), fruit canned in it's own juice + cottage cheese    4.  Aim for 20-30 minutes of activity most days of the week (with the okay of your OB provider).      5.  Start using the Freestyle Courtney 3:   SENSOR BASICS:  Understand that your glucose sensor measures your glucose in your interstitial fluid and your blood sugar measures  "your glucose in your blood stream. The readings are not meant to be the same.        Your sensor glucose will lag behind your blood glucose.  \"Remember the roller coaster\".  Your blood sugar is always the front car and your sensor glucose is always the last car.  When blood sugar is moving up or down, the more difference there will be.          Take it easy while wearing the sensor.  Take extra care while bathing and getting dressed.  Wear loose fitting clothes on your sensor and try to avoid laying on your sensor.  You can shower/bathe with the sensor on.  But avoid submerging the sensor more than 3 feet for more than 30 minutes.  Gently pat to dry.    INITIAL SET UP:  Download the Courtney 3 vidal if using your smartphone and create an account.  The vidal will walk you through set up.  If you are using the [employer] , turn it on and follow instructions for set up.      Courtney 3 vidal:       There will be a 60 minute warm up for each new sensor.  Each sensor should last 14 days.  Do not take more than 500mg of vitamin C (Courtney 3+ more than 1000mg) per day or this can affect sensor accuracy.    The first 12 hours of every new sensor often a little \"off\" as it gets to know your body fluids.  Set glucose alarms for highs and lows per your preference or at the recommendations of your diabetes educator.  You will not be able to silence or turn off the urgent low alert at 54 mg/dL.  If an alarm goes off, go to your vidal and acknowledge it or you will continue to get alarmed every 5 minutes.  Your [employer] 3 vidal or  will notify you when it is time to change your sensor.  Just remove it like a band aid and throw it in the trash, then place a new sensor.  It is recommended to switch arms with every sensor.    DAILY ROUTINE:  Keep your phone or  within 20 feet of you to keep data communicating with your device.  If you are away from your device for more than 20 minutes, your device will alarm.  Be curious with what " "the sensor data shows.  How do your food choices impact your glucose?  Exercise?  Complete a fingerstick glucose check at these times:                          -when you feel differently than the sensor indicates                          -when you are not wearing a sensor                          -when you see this symbol:     DATA SHARING:  Our clinic will link to your data as well (phone users only). Under the menu (3 lines in the upper left corner), go to connected apps, then touch \"connect\" next to [employer], and then \"connect to a practice\".  Enter our practice ID \"[id number]\" and hit connect.  Patients using the reader can upload from home via desktop or laptop computer on [employer] or will have the  downloaded in clinic.     OTHER IMPORTANT NOTES:  If the sensor is often falling off before the 14 days are up, there are products than can help.  Talk to your diabetes educator.  You can leave your Courtney sensor on for radiology scans (Xray, CT scan or MRI), however it is recommended that you use fingersticks for accurate readings during and 1 hour after an MRI as the accuracy of the sensor is questionable during this time.  Contact the  if the sensor does not last the full 14 days for any reason, or if you have any other technical problems. Keep your Courtney sensor box with the lot and serial numbers as they often ask for this information.  -Courtney customer service phone number: [phone].      -Website for courtney replacement due to sensor failure or falling off: https://www.[email or web address].abbott/us-en/support/sensor-support-form-questions.html  If your copay is more than $75 a month, call the copay assistance line at [phone] and they will work with your pharmacy to get your cost down.    Documenting glucoses in the vidal:  -Please type in your fasting sensor glucose every morning using the notes feature in your vidal.  For example: F 94 for fasting reading of 94 mg/dL.  Remember, the goal is " less than 95 for morning fasting.    How to do this: click the ADD NOTE button at the bottom of your home screen, then in the comments section at the bottom, type in  F 94 , then touch DONE.    -Set your timer for 1 hour when you start eating your meal.  For example, 1B 123 for 1 hour after breakfast reading of 123 mg/dL.  Remember, 1 hour after a meal the goal is 140 mg/dL or less.     How to do this: click the ADD NOTE button at the bottom of your home screen, then in the comments section at the bottom, type in  1B 123 , then touch DONE    Sensor glucose goals for pregnancy:  The goal sensor glucose target range for pregnancy is  mg/dL    6.  Follow up: with Slime via phone on Monday, 12/9/24 at 9 AM.     7.  Call Diabetes Education at [phone] or send a [employer] message with:   -questions or concerns   -ketones that are small, moderate, or large   -3 or more blood sugars above target in a 7 day period    Thank you,     Slime Kramer, MPH, RD, CDCES, LD 12/5/2024

## 2024-12-05 NOTE — PROGRESS NOTES
Diabetes and Pregnancy Follow-up  Type of Service: Telephone Visit/ 31 minutes     Originating Location (Patient Location): [place]  Distant Location (Provider Location): [place] [facility]  Mode of Communication:  Telephone     Telephone Visit Start Time: 1:07 PM  Telephone Visit End Time (telephone visit stop time): 1:38 PM     How would patient like to obtain AVS? Ashli    Subjective/Objective:    Tami Almaraz was called for a scheduled BG review. Last date of communication was: 12/2/24.    Gestational diabetes is being managed with diet and activity    Taking diabetes medications: no    Estimated Date of Delivery: Apr 16, 2025    Blood Glucose/Ketone Log:    Date Ketones Fasting Post Breakfast Post Lunch Post Supper   12/3   118 (1 hour after 2 cookies, apple juice, sambusa)  121   12/4  99 139 122 142   12/5  111      (Testing 1 hour post meals)    Assessment:  Ketones: not addressed today.   Fasting blood glucoses: 0% in target.  After breakfast: 100% in target.  Before lunch: n/a% in target.  After lunch: 100% in target.  Before dinner: n/a% in target.  After dinner: 50% in target.    Patient misunderstood the direction given at appointment on 12/2/24 with NA colleague.  Reports for each of the last two evenings, she has stopped eating around 8 PM and then waited until 9-10:30 AM to test her fasting glucose.  She now understands she should have her HS snack, containing both carbohydrate + protein, about 8-9 PM, only have something that does not contain carbohydrates later in the evening (when her  eats upon his arrival home from work), and then check her fasting glucose upon awaking for her day around 5 AM.  She should have breakfast within the hour of waking for the day.  Discussed how this long interval between HS snack and checking fasting glucose, 12-13+ hours each of the past 2 days, can cause an elevated fasting glucose.  She agrees to work on this.  Provided suggestions for HS snack.  Reviewed  recommended meal plan.  States she plans to start using the Courtney 3 sensor tonight as her sister is coming to assist her in placing it.  Discussed she will either need to write down OR document in the vidal her fasting and post meal glucoses. Phone follow up with writer scheduled for 12/9.     Plan/Response:  Test glucose 4 times per day:   Fasting (when you first awake for the day, around 5 AM): 95 mg/dL or below   1 hour after breakfast: 140 mg/dL or below   1 hour after lunch: 140 mg/dL or below   1 hour after dinner: 140 mg/dL or below     Please bring your meter and log book to all appointments     If you miss 1 hour after meal test, test 2 hours after the meal.  Goal 2 hours after is 120 mg/dL or below.     2.  Check your urine ketones once a day, when you first awake for the day until they are negative to trace for 7 days in a row.  Then decrease and check once a week.     3.  Meal Plan    Breakfast (5-6 AM): 30 grams carbohydrate + protein   Snack (8-9 AM): 15-30 grams carbohydrate + protein  Lunch (11 AM - 12 PM): 45-60 grams carbohydrate + protein  Snack (2-3 PM): 15-30 grams carbohydrate + protein  Dinner (5-6 PM): 45-60 grams carbohydrate + protein  Snack(8-9 PM): 15-30 grams carbohydrate + protein  Later, just have water, sparkling water; something without carbohydrate    A few tips:   -consume some carbohydrate every 2-3 hours while awake: times above are approximate and meant to provide an idea of what the meal plan looks like in practice   -you need a minimum of 175 grams of carbohydrate per day   -fruit and cold breakfast cereal are best tolerated at lunch or later   -protein includes: cheese, eggs, fish, nuts, nut butter, chicken, turkey, beef, and pork   -snack ideas: an individual container of Greek yogurt (try Chobani Less Sugar), whole grain crackers and cheese, chocolate fairlife milk, a Kashi or KIND bar, a baseball size piece of whole fruit + nut butter (apple + peanut butter), fruit canned in  it's own juice + cottage cheese    4.  Aim for 20-30 minutes of activity most days of the week (with the okay of your OB provider).      5.  Start using the Freestyle Courtney 3:   Documenting glucoses in the vidal:  -Please type in your fasting sensor glucose every morning using the notes feature in your vidal.  For example: F 94 for fasting reading of 94 mg/dL.  Remember, the goal is less than 95 for morning fasting.    How to do this: click the ADD NOTE button at the bottom of your home screen, then in the comments section at the bottom, type in  F 94 , then touch DONE.    -Set your timer for 1 hour when you start eating your meal.  For example, 1B 123 for 1 hour after breakfast reading of 123 mg/dL.  Remember, 1 hour after a meal the goal is 140 mg/dL or less.     How to do this: click the ADD NOTE button at the bottom of your home screen, then in the comments section at the bottom, type in  1B 123 , then touch DONE    Sensor glucose goals for pregnancy:  The goal sensor glucose target range for pregnancy is  mg/dL    6.  Follow up: with Slime via phone on Monday, 12/9/24 at 9 AM.     7.  Call Diabetes Education at [phone] or send a [email or web address] message with:   -questions or concerns   -ketones that are small, moderate, or large   -3 or more blood sugars above target in a 7 day period    Slime Kramer, MPH, RD, CDCES, LD 12/5/2024    Time Spent: 31 minutes    Any diabetes medication dose changes were made via the CDE Protocol and Collaborative Practice Agreement with the patient's referring provider. A copy of this encounter was shared with the provider.

## 2024-12-06 ENCOUNTER — MYC MEDICAL ADVICE (OUTPATIENT)
Dept: OBGYN | Facility: CLINIC | Age: 29
End: 2024-12-06
Payer: COMMERCIAL

## 2024-12-09 NOTE — TELEPHONE ENCOUNTER
, 21w5d.    Pt was last seen for prenatal care on 12/3.  Next visit is scheduled for .    Pt thinks she may have a vaginal infection.    Pt is complaining of vaginal itching and copious amounts of vaginal discharge.    After further triage pt is no longer experiencing these symptoms and only noticed after intercourse.    Melina Leon RN- OB/GYN group

## 2024-12-10 ENCOUNTER — HOSPITAL ENCOUNTER (OUTPATIENT)
Dept: ULTRASOUND IMAGING | Facility: CLINIC | Age: 29
Discharge: HOME | End: 2024-12-10
Attending: STUDENT IN AN ORGANIZED HEALTH CARE EDUCATION/TRAINING PROGRAM
Payer: COMMERCIAL

## 2024-12-10 ENCOUNTER — OFFICE VISIT (OUTPATIENT)
Dept: MATERNAL FETAL MEDICINE | Facility: CLINIC | Age: 29
End: 2024-12-10
Attending: STUDENT IN AN ORGANIZED HEALTH CARE EDUCATION/TRAINING PROGRAM
Payer: COMMERCIAL

## 2024-12-10 DIAGNOSIS — Z36.2 ENCOUNTER FOR FOLLOW-UP ULTRASOUND OF FETAL ANATOMY: Primary | ICD-10-CM

## 2024-12-10 DIAGNOSIS — O24.419 GESTATIONAL DIABETES MELLITUS (GDM), ANTEPARTUM, GESTATIONAL DIABETES METHOD OF CONTROL UNSPECIFIED: ICD-10-CM

## 2024-12-10 DIAGNOSIS — O26.90 PREGNANCY RELATED CONDITION, ANTEPARTUM: ICD-10-CM

## 2024-12-10 PROCEDURE — 76811 OB US DETAILED SNGL FETUS: CPT

## 2024-12-10 PROCEDURE — G0463 HOSPITAL OUTPT CLINIC VISIT: HCPCS | Performed by: STUDENT IN AN ORGANIZED HEALTH CARE EDUCATION/TRAINING PROGRAM

## 2024-12-10 NOTE — NURSING NOTE
Patient reports positive fetal movement, no pain, no contractions or bleeding.  Did have about 24 hours of increased watery discharge after  intercourse on Friday, no leaking since.  Reports blood sugar values fasting elevated to 110's.  Meets with diabetic ed on Thursday.   SBAR given to DELBERT VIRGEN, see their note in Epic.

## 2024-12-10 NOTE — PROGRESS NOTES
The patient was seen for an [facility] in [facility] clinic today.  For a detailed report of the ultrasound examination, please see the ultrasound report which can be found under the imaging tab.    If you have questions regarding today's evaluation or if we can be of further service, please contact the [facility].    Aurora Syed M.D.  Maternal Fetal-Medicine Specialist

## 2024-12-26 NOTE — PATIENT INSTRUCTIONS
If you have labs or imaging done, the results will automatically release in [employer] without an interpretation.  Your health care professional will review those results and send an interpretation with recommendations as soon as possible, but this may be 1-3 business days.    If you have any questions regarding your visit, please contact your care team.     [employer] Access Services: [phone]  Women [facility] CLINIC HOURS TELEPHONE NUMBER   Akash Page .      Sherry-KEISHA Summers-KEISHA Zendejas-Surgery Scheduler  Lissy-         Monday-[place]  8:00 am-4:00 pm  [facility]00 [facility]  Wednesday-[place] 8:00 am-4:00 pm  Thursday-[place] 8:00 am-4:00 pm    Typical Surgery Day Friday [facility]  [address] [place] [address]  PH: [phone] Fax    Imaging Scheduling all locations  PH: [phone]     [facility] and Delivery  [address] [place] [address] Dr.  [place], [place] [address] [phone]    [facility]  [address] [place] [address]  PH: [phone]     **Surgeries** Our Surgery Schedulers will contact you to schedule. If you do not receive a call within 3 business days, please call [phone].  Urgent Care locations:  [facility]       Monday-Friday   10 am - 8 pm  Saturday and Sunday   9 am - 5 pm   [phone]   If you need a medication refill, please contact your pharmacy. Please allow 3 business days for your refill to be completed.  As always, Thank you for trusting us with your healthcare needs!   Weeks 22 to 26 of Your Pregnancy: Care Instructions  Your baby's lungs are getting ready for breathing. Your baby may respond to your voice. Your baby likely turns less, and kicks or jerks more. Jerking may mean that your baby has hiccups.    Think about taking childbirth classes. And start to  "think about whether you want to have pain medicine during labor.   At your next doctor visit, you may be tested for anemia and for high blood sugar that first occurs during pregnancy (gestational diabetes). These conditions can cause problems for you and your baby.         To ease discomfort, such as back pain   Change your position often. Try not to sit or stand for too long.  Get some exercise. Things like walking or stretching may help.  Try using a heating pad or cold pack.        To ease or reduce swelling in your feet, ankles, hands, and fingers   Take off your rings.  Avoid high-sodium foods, such as potato chips.  Prop up your feet, and sleep with pillows under your feet.  Try to avoid standing for long periods of time.  Do not wear tight shoes.  Wear support stockings.  Kegel exercises to prevent urine from leaking    Squeeze your muscles as if you were trying not to pass gas. Your belly, legs, and buttocks shouldn't move. Hold the squeeze for 3 seconds, then relax for 5 to 10 seconds.    Add 1 second each week until you can squeeze for 10 seconds. Repeat the exercise 10 times a session. Do 3 to 8 sessions a day. If these exercises cause you pain, stop doing them and talk with your doctor.  Follow-up care is a key part of your treatment and safety. Be sure to make and go to all appointments, and call your doctor if you are having problems. It's also a good idea to know your test results and keep a list of the medicines you take.  Where can you learn more?  Go to https://www.Door[employer].net/patiented  Enter G264 in the search box to learn more about \"Weeks 22 to 26 of Your Pregnancy: Care Instructions.\"  Current as of: April 30, 2024  Content Version: 14.3    2024 [employer].   Care instructions adapted under license by your healthcare professional. If you have questions about a medical condition or this instruction, always ask your healthcare professional. [employer] disclaims any " warranty or liability for your use of this information.

## 2024-12-31 ENCOUNTER — PRENATAL OFFICE VISIT (OUTPATIENT)
Dept: OBGYN | Facility: CLINIC | Age: 29
End: 2024-12-31
Payer: COMMERCIAL

## 2024-12-31 ENCOUNTER — HOSPITAL ENCOUNTER (OUTPATIENT)
Dept: ULTRASOUND IMAGING | Facility: CLINIC | Age: 29
Discharge: HOME | End: 2024-12-31
Attending: STUDENT IN AN ORGANIZED HEALTH CARE EDUCATION/TRAINING PROGRAM
Payer: COMMERCIAL

## 2024-12-31 ENCOUNTER — OFFICE VISIT (OUTPATIENT)
Dept: MATERNAL FETAL MEDICINE | Facility: CLINIC | Age: 29
End: 2024-12-31
Attending: STUDENT IN AN ORGANIZED HEALTH CARE EDUCATION/TRAINING PROGRAM
Payer: COMMERCIAL

## 2024-12-31 VITALS
WEIGHT: 278.7 LBS | HEART RATE: 81 BPM | BODY MASS INDEX: 42.38 KG/M2 | SYSTOLIC BLOOD PRESSURE: 110 MMHG | DIASTOLIC BLOOD PRESSURE: 71 MMHG

## 2024-12-31 DIAGNOSIS — O99.212 OBESITY AFFECTING PREGNANCY IN SECOND TRIMESTER, UNSPECIFIED OBESITY TYPE: ICD-10-CM

## 2024-12-31 DIAGNOSIS — Z36.2 ENCOUNTER FOR FOLLOW-UP ULTRASOUND OF FETAL ANATOMY: ICD-10-CM

## 2024-12-31 DIAGNOSIS — O99.210 OBESITY DURING PREGNANCY: ICD-10-CM

## 2024-12-31 DIAGNOSIS — O24.410 DIET CONTROLLED GESTATIONAL DIABETES MELLITUS (GDM) IN SECOND TRIMESTER: Primary | ICD-10-CM

## 2024-12-31 DIAGNOSIS — O99.013 ANEMIA COMPLICATING PREGNANCY IN THIRD TRIMESTER: Primary | ICD-10-CM

## 2024-12-31 LAB
ERYTHROCYTE [DISTWIDTH] IN BLOOD BY AUTOMATED COUNT: 13 % (ref 10–15)
HCT VFR BLD AUTO: 32.5 % (ref 35–47)
HGB BLD-MCNC: 10.9 G/DL (ref 11.7–15.7)
MCH RBC QN AUTO: 28.6 PG (ref 26.5–33)
MCHC RBC AUTO-ENTMCNC: 33.5 G/DL (ref 31.5–36.5)
MCV RBC AUTO: 85 FL (ref 78–100)
PLATELET # BLD AUTO: 289 10E3/UL (ref 150–450)
RBC # BLD AUTO: 3.81 10E6/UL (ref 3.8–5.2)
T PALLIDUM AB SER QL: NONREACTIVE
WBC # BLD AUTO: 9.3 10E3/UL (ref 4–11)

## 2024-12-31 PROCEDURE — 99207 PR COMPLICATED OB VISIT: CPT | Performed by: GENERAL PRACTICE

## 2024-12-31 PROCEDURE — 76816 OB US FOLLOW-UP PER FETUS: CPT

## 2024-12-31 PROCEDURE — 85027 COMPLETE CBC AUTOMATED: CPT | Performed by: GENERAL PRACTICE

## 2024-12-31 PROCEDURE — 36415 COLL VENOUS BLD VENIPUNCTURE: CPT | Performed by: GENERAL PRACTICE

## 2024-12-31 PROCEDURE — 86780 TREPONEMA PALLIDUM: CPT | Performed by: GENERAL PRACTICE

## 2024-12-31 PROCEDURE — G0463 HOSPITAL OUTPT CLINIC VISIT: HCPCS | Mod: 25 | Performed by: STUDENT IN AN ORGANIZED HEALTH CARE EDUCATION/TRAINING PROGRAM

## 2024-12-31 RX ORDER — FERROUS SULFATE 325(65) MG
325 TABLET ORAL
Qty: 90 TABLET | Refills: 1 | Status: SHIPPED | OUTPATIENT
Start: 2024-12-31

## 2024-12-31 NOTE — PROGRESS NOTES
The patient was seen for an ultrasound in the [facility] today.  For a detailed report of the ultrasound examination, please see the ultrasound report which can be found under the imaging tab.    If you have questions regarding today's evaluation or if we can be of further service, please contact the [facility].    Scarlett Swann MD  , OB/GYN  Maternal-Fetal Medicine

## 2024-12-31 NOTE — NURSING NOTE
Patient presents to M for RL2 at 24w6d due to suboptimal anatomy, GDM with suboptimal control. Positive fetal movement. Denies LOF, vaginal bleeding or cramping/contractions. SBAR given to M MD, see their note in Epic.

## 2024-12-31 NOTE — PROGRESS NOTES
Tami Almaraz is a 29 year old  at 24w6d presenting for routine prenatal care. She is doing well. Reports worsening discomforts of pregnancy. Reports active fetal movement. Denies loss of fluid, vaginal bleeding, contractions. Has not been consistently taking her blood sugars but reports often elevated fasting.       Objective:  /71   Pulse 81   Wt 126.4 kg (278 lb 11.2 oz)   LMP 2024   BMI 42.38 kg/m    WDWN, NAD  Non-labored breathing  Abdomen soft, nttp  FH: consistent with dates   FHT: 140      A/P: Tami Almaraz is a 29 year old  at 24w6d presenting for routine ob visit. No new concerning findings on history or exam.  Fetal status is reassuring today.      ICD-10-CM    1. Diet controlled gestational diabetes mellitus (GDM) in second trimester  O24.410       2. Obesity affecting pregnancy in second trimester, unspecified obesity type  O99.212           -GDM: discussed importance of 4x daily FSBG, adherence to diabetic diet. Discussed risks of GDM in pregnancy, intrapartum and postpartum. Pt to send logs to diabetic educator  -Recommend daily prenatal vitamin  -Followed by MFM. Has follow up US today  -CBC and RPR today   -O POS; Rhogam at 28 weeks is not indicated  -TDaP next visit  -Follow up in 4 weeks for routine OB visit  -Return precautions reviewed    Akash Page DO, FACOG

## 2025-01-11 ENCOUNTER — MYC MEDICAL ADVICE (OUTPATIENT)
Dept: OBGYN | Facility: CLINIC | Age: 30
End: 2025-01-11
Payer: COMMERCIAL

## 2025-01-28 ENCOUNTER — PRENATAL OFFICE VISIT (OUTPATIENT)
Dept: OBGYN | Facility: CLINIC | Age: 30
End: 2025-01-28
Payer: COMMERCIAL

## 2025-01-28 ENCOUNTER — TELEPHONE (OUTPATIENT)
Dept: OBGYN | Facility: CLINIC | Age: 30
End: 2025-01-28

## 2025-01-28 VITALS
BODY MASS INDEX: 43.33 KG/M2 | OXYGEN SATURATION: 95 % | SYSTOLIC BLOOD PRESSURE: 117 MMHG | WEIGHT: 285 LBS | HEART RATE: 95 BPM | DIASTOLIC BLOOD PRESSURE: 73 MMHG

## 2025-01-28 DIAGNOSIS — O99.212 OBESITY AFFECTING PREGNANCY IN SECOND TRIMESTER, UNSPECIFIED OBESITY TYPE: ICD-10-CM

## 2025-01-28 DIAGNOSIS — O24.410 DIET CONTROLLED GESTATIONAL DIABETES MELLITUS (GDM) IN SECOND TRIMESTER: ICD-10-CM

## 2025-01-28 DIAGNOSIS — Z23 NEED FOR TDAP VACCINATION: Primary | ICD-10-CM

## 2025-01-28 LAB
ERYTHROCYTE [DISTWIDTH] IN BLOOD BY AUTOMATED COUNT: 12.9 % (ref 10–15)
EST. AVERAGE GLUCOSE BLD GHB EST-MCNC: 143 MG/DL
FASTING STATUS PATIENT QL REPORTED: YES
GLUCOSE SERPL-MCNC: 124 MG/DL (ref 70–99)
HBA1C MFR BLD: 6.6 % (ref 0–5.6)
HCT VFR BLD AUTO: 31.7 % (ref 35–47)
HGB BLD-MCNC: 10.5 G/DL (ref 11.7–15.7)
MCH RBC QN AUTO: 28.2 PG (ref 26.5–33)
MCHC RBC AUTO-ENTMCNC: 33.1 G/DL (ref 31.5–36.5)
MCV RBC AUTO: 85 FL (ref 78–100)
PLATELET # BLD AUTO: 293 10E3/UL (ref 150–450)
RBC # BLD AUTO: 3.73 10E6/UL (ref 3.8–5.2)
WBC # BLD AUTO: 9.6 10E3/UL (ref 4–11)

## 2025-01-28 PROCEDURE — 99207 PR PRENATAL VISIT: CPT | Performed by: OBSTETRICS & GYNECOLOGY

## 2025-01-28 PROCEDURE — 36415 COLL VENOUS BLD VENIPUNCTURE: CPT | Performed by: OBSTETRICS & GYNECOLOGY

## 2025-01-28 PROCEDURE — 85027 COMPLETE CBC AUTOMATED: CPT | Performed by: OBSTETRICS & GYNECOLOGY

## 2025-01-28 PROCEDURE — 90715 TDAP VACCINE 7 YRS/> IM: CPT | Performed by: OBSTETRICS & GYNECOLOGY

## 2025-01-28 PROCEDURE — 90471 IMMUNIZATION ADMIN: CPT | Performed by: OBSTETRICS & GYNECOLOGY

## 2025-01-28 PROCEDURE — 82947 ASSAY GLUCOSE BLOOD QUANT: CPT | Performed by: OBSTETRICS & GYNECOLOGY

## 2025-01-28 PROCEDURE — 83036 HEMOGLOBIN GLYCOSYLATED A1C: CPT | Performed by: OBSTETRICS & GYNECOLOGY

## 2025-01-28 RX ORDER — PNV NO.95/FERROUS FUM/FOLIC AC 28MG-0.8MG
1 TABLET ORAL DAILY
Qty: 30 TABLET | Refills: 6 | Status: SHIPPED | OUTPATIENT
Start: 2025-01-28

## 2025-01-28 NOTE — TELEPHONE ENCOUNTER
Patient seen Dr. Agbeh today & had lab work.  Provider would like patient started on insulin d/t continued increased blood sugar levels.   1/28/2025 glucose =143  1/28/2025 A1C = 6.6       Provider spoke with patient today letting know it is very important to speak with the diabetic educators.    Provider asked this writer to contact DM ed.  -called spoke with DM ed department,  for nurses to please attempt to reach out to patient again.     Routing to DM ed.     -thank you    Celia SMITH RN -  OB/GYN group

## 2025-01-28 NOTE — PROGRESS NOTES
Prior to immunization administration, verified patients identity using patient s name and date of birth. Please see Immunization Activity for additional information.     Screening Questionnaire for Adult Immunization    Are you sick today?   No   Do you have allergies to medications, food, a vaccine component or latex?   No   Have you ever had a serious reaction after receiving a vaccination?   No   Do you have a long-term health problem with heart, lung, kidney, or metabolic disease (e.g., diabetes), asthma, a blood disorder, no spleen, complement component deficiency, a cochlear implant, or a spinal fluid leak?  Are you on long-term aspirin therapy?   No   Do you have cancer, leukemia, HIV/AIDS, or any other immune system problem?   No   Do you have a parent, brother, or sister with an immune system problem?   No   In the past 3 months, have you taken medications that affect  your immune system, such as prednisone, other steroids, or anticancer drugs; drugs for the treatment of rheumatoid arthritis, Crohn s disease, or psoriasis; or have you had radiation treatments?   No   Have you had a seizure, or a brain or other nervous system problem?   No   During the past year, have you received a transfusion of blood or blood    products, or been given immune (gamma) globulin or antiviral drug?   No   For women: Are you pregnant or is there a chance you could become       pregnant during the next month?   Yes   Have you received any vaccinations in the past 4 weeks?   No     Immunization questionnaire was positive for at least one answer.  Notified Agbeh Tdap.      Patient instructed to remain in clinic for 15 minutes afterwards, and to report any adverse reactions.     Screening performed by Krista Buck CMA on 1/28/2025 at 9:42 AM.

## 2025-01-28 NOTE — PATIENT INSTRUCTIONS
To Schedule an Appointment 24/7  Call: [phone]    If you have any questions regarding your visit, Please contact your care team.  [facility] Access Services: [phone]  [facility] CLINIC HOURS TELEPHONE NUMBER   Toneys Agbeh, M.D.      Kamilah Zendejas-Surgery Scheduler  Lissy-Surgery Scheduler       Monday - [place]:    8:00 am-4:45 pm  Tuesday - [place]:   8:00 am-4:45 pm  Friday-[place]:       8:00 am-4:45 pm  Typical Surgery Day:  Wednesday [facility]   [address] [place] [address]   [phone]   Fax [phone]    Imaging Scheduling all locations  [phone]      [facility]   [address] [place] [address] [place] [address]   [phone]    [facility]  [address] [facility] [address]  [phone]  Fax [phone]   Urgent Care locations:  [facility] Monday-Friday                               10 am - 8 pm  Saturday and Sunday                      9 am - 5 pm  Monday-Friday                              10 am- 8 pm  Saturday and Sunday                      9 am - 5 pm    [phone]   **Surgeries** Our Surgery Schedulers will contact you to schedule. If you do not receive a call within 3 business days, please call [phone].  If you need a medication refill, please contact your pharmacy. Please allow 3 business days for your refill to be completed.  As always, Thank you for trusting us with your healthcare needs!  see additional instructions from your care team below   Weeks 26 to 30 of Your Pregnancy: Care Instructions  You're starting your last trimester. You'll probably feel your baby moving around more. Your back may ache as your body gets used to your baby's size and length. Take care of yourself, and pay attention to what your body needs.    Talk to your doctor about getting the Tdap shot. It will help protect your   against whooping cough (pertussis). Also ask your doctor about flu and COVID-19 shots if you haven't had them yet. If your blood type is Rh negative, you may be given a shot of Rh immune globulin (such as RhoGAM). It can help prevent problems for your baby.   You may have [place] contractions. They are single or several strong contractions without a pattern. These are practice contractions but not the start of labor.   Be kind to yourself.       Take breaks when you're tired.  Change positions often. Don't sit for too long or stand for too long.  At work, rest during breaks if you can. If you don't get breaks, talk to your doctor about writing a letter to your employer to request them.  Avoid fumes, chemicals, and tobacco smoke.  Be sexual if you want to.       You may be interested in sex, or you may not. Everyone is different.  Sex is okay unless your doctor tells you not to.  Your belly can make it hard to find good positions for sex. [place] and explore.  Watch for signs of  labor.        These signs include:  Menstrual-like cramps. Or you may have pain or pressure in your pelvis that happens in a pattern.  About 6 or more contractions in an hour (even after rest and a glass of water).  A low, dull backache that doesn't go away when you change positions.  An increase or change in vaginal discharge.  Light vaginal bleeding or spotting.  Your water breaking.  Know what to do if you think you are having contractions.       Drink 1 or 2 glasses of water.  Lie down on your left side for at least an hour.  While on your side, feel the top of your belly to see if it's tight.  Write down your contractions for an hour. Time how long it is from the start of one contraction to the start of the next.  Call your doctor if you have regular contractions.  Follow-up care is a key part of your treatment and safety. Be sure to make and go to all appointments, and call your doctor if you are having  "problems. It's also a good idea to know your test results and keep a list of the medicines you take.  Where can you learn more?  Go to https://www.[employer].net/patiented  Enter S999 in the search box to learn more about \"Weeks 26 to 30 of Your Pregnancy: Care Instructions.\"  Current as of: April 30, 2024  Content Version: 14.3    2024 [employer].   Care instructions adapted under license by your healthcare professional. If you have questions about a medical condition or this instruction, always ask your healthcare professional. [employer] disclaims any warranty or liability for your use of this information.    Weeks 30 to 32 of Your Pregnancy: Care Instructions  Your baby is growing more every day. Its eyes can open and close, and it may have hair on its head. Your baby may sleep 20 to 45 minutes at a time and is more active at certain times.    You should feel your baby move several times every day. Your baby now turns less and kicks more.   This is a good time to tour your hospital or birthing center. You may also want to find childcare if needed.         To ease heartburn   Avoid foods that make your symptoms worse, such as chocolate, spicy foods, and caffeine.  Avoid bending over or lying down after meals.  Do not eat for 2 hours before bedtime.  Take antacids like Tums, but don't take ones that have sodium bicarbonate, magnesium trisilicate, or aspirin.        To care for large, swollen veins (varicose veins)   Try to avoid standing for long periods of time.  Sit with your feet propped up.  Wear support hose.  Get some exercise every day, like walking or swimming.  Counting your baby's kicks  Your doctor may ask you to count your baby's movements, such as kicks, flutters, or rolls.    Find a quiet place, and get comfortable. Write down your start time. Count your baby's movements (except hiccups). When your baby has moved 10 times, you can stop counting. Write down how many minutes it " "took.   If an hour goes by and you don't feel 10 movements, have something to eat or drink. Count for another hour. If you don't feel at least 10 movements in the 2-hour period, call your doctor.   Follow-up care is a key part of your treatment and safety. Be sure to make and go to all appointments, and call your doctor if you are having problems. It's also a good idea to know your test results and keep a list of the medicines you take.  Where can you learn more?  Go to https://www.[employer].net/patiented  Enter X471 in the search box to learn more about \"Weeks 30 to 32 of Your Pregnancy: Care Instructions.\"  Current as of: April 30, 2024  Content Version: 14.3    2024 [employer].   Care instructions adapted under license by your healthcare professional. If you have questions about a medical condition or this instruction, always ask your healthcare professional. [employer] disclaims any warranty or liability for your use of this information.    "

## 2025-01-28 NOTE — PROGRESS NOTES
"28w6d   No HA, visual changes, N/V etc. patient has not been compliant with calling her blood sugars to the diabetic educator.  I explained the importance of this communication to be able to determine whether she is going to need insulin.  She is going to have some blood work done today to check for A1c and a spot glucose testing.  She is also going to have a CBC repeated since she says she is not able to tolerate her iron tablets which have been changed for her.  She is also going to have a repeat follow-up growth ultrasound per [facility] recommendations as below.  RTC 2 wk/prn.  Vital signs:      BP: 117/73 Pulse: 95     SpO2: 95 %       Weight: 129.3 kg (285 lb)  Estimated body mass index is 43.33 kg/m  as calculated from the following:    Height as of 6/14/24: 1.727 m (5' 8\").    Weight as of this encounter: 129.3 kg (285 lb).      ICD-10-CM    1. Need for Tdap vaccination  Z23 TDAP 7+ (ADACEL,BOOSTRIX)     US OB Follow Up >14 Weeks      2. Obesity affecting pregnancy in second trimester, unspecified obesity type  O99.212 US OB Follow Up >14 Weeks     Hemoglobin A1c     CBC with platelets     Glucose     Ferrous Sulfate (IRON) 325 (65 Fe) MG tablet     Hemoglobin A1c     CBC with platelets     Glucose      3. BMI 40.0-44.9, adult (H)  Z68.41 US OB Follow Up >14 Weeks     Hemoglobin A1c     CBC with platelets     Glucose     Ferrous Sulfate (IRON) 325 (65 Fe) MG tablet     Hemoglobin A1c     CBC with platelets     Glucose      4. Diet controlled gestational diabetes mellitus (GDM) in second trimester  O24.410 US OB Follow Up >14 Weeks     Hemoglobin A1c     CBC with platelets     Glucose     Ferrous Sulfate (IRON) 325 (65 Fe) MG tablet     Hemoglobin A1c     CBC with platelets     Glucose        CEPHAS AGBEH, MD.    [facility] RECOMMENDATION  ---------------------------------------------------------------------------------------------------------  Thank-you for referring your patient for ultrasound assessment.     I discussed " the findings on today's ultrasound with the patient. I reviewed the limitations of ultrasound.     Tami reports she has not been consistently checking her blood glucose. I reviewed the importance of this, and she also discussed this with Dr. Page at her visit earlier  today. She plans to follow up with diabetic education. Recommend A1C to provide additional data. She states she was previously discussing starting insulin.     Recommend serial growth US q4 weeks and if requiring insulin, initiation of twice weekly  surveillance at 32 weeks gestation for GDM vs. pre-gestational diabetes  (10/8/24 A1C 6.3, early 1 hr GCT 24 at 196). She was 16 weeks at the time of early 1 hr GCT thus fetal echo is not indicated. If suboptimal control, recommend  initiation of weekly  surveillance at 28 weeks gestation, increasing to twice weekly at 32 weeks gestation. If not requiring medication but has persistently unknown  control, consider weekly  surveillance beginning at 28 weeks gestation. If optimal glucose control, recommend weekly  surveillance at 34 weeks for BMI  >40. Delivery is recommended at [id number] in the setting of optimal glucose control. If there is suboptimal control, earlier delivery may be considered. I presume follow  up US assessment will be completed in your office. Please refer back to [facility] if concerns, or if needing assistance with delivery timing recommendations.     Return to primary provider for continued prenatal care.     If you have questions regarding today's evaluation or if we can be of further service, please contact the [facility].     **Fetal anomalies may be present but not detected**     I spent a total of 15 minutes (excluding the ultrasound interpretation) on the date of this encounter including preparing to see the patient (reviewing medical records/tests),  in direct face-to-face contact with the patient during the visit  counseling and discussing the plan of care and documenting the visit in the electronic medical record.                                                                         IMPRESSION  ---------------------------------------------------------------------------------------------------------  1. Ramsey pregnancy at 24w 6d gestational age.  2. The remaining fetal anatomic survey was completed, no fetal anomalies commonly detected by ultrasound were identified within the limits of prenatal ultrasound.  3. Growth parameters and estimated fetal weight are large for gestational age. EFW 90%, AC 93%.  4. The amniotic fluid volume appeared normal.

## 2025-01-29 ENCOUNTER — VIRTUAL VISIT (OUTPATIENT)
Dept: EDUCATION SERVICES | Facility: OTHER | Age: 30
End: 2025-01-29
Attending: OBSTETRICS & GYNECOLOGY
Payer: COMMERCIAL

## 2025-01-29 DIAGNOSIS — O24.410 DIET CONTROLLED GESTATIONAL DIABETES MELLITUS (GDM) IN SECOND TRIMESTER: Primary | ICD-10-CM

## 2025-01-29 NOTE — PATIENT INSTRUCTIONS
"  Go to pharmacy later today and refill the Courtney sensor and get that back up and running.  Do a fingerstick glucose the next 2 mornings and record that number.  Take it within 30 minutes of waking up and before you eat or drink anything.  Put in markers in your Courtney vidal for your post meal blood sugars (see below)  Link up your Courtney data to us (see below)    Check glucose four times daily, before breakfast and 1 hour after each meal.  Check ketones once a week when readings are consistently negative.  Continue with recommended physical activity.  Continue to follow recommended meal plan: 30-45g carbohydratess at breakfast, 45-60g carbohydratess at lunch, 45-60g carbohydrates at supper, 15-30g carbohydrates at snacks.  Follow consistent carbohydrate meal plan, eat carbohydrates and protein/fat at all meals/snacks.    If 3 or more blood sugars are above the goal at a given time, or if Ketones are small, moderate or large, call or MyChart message the diabetes educator.    Freestyle Courtney 3 or 3+    SENSOR BASICS:  Understand that your glucose sensor measures your glucose in your interstitial fluid and your blood sugar measures your glucose in your blood stream. The readings are not meant to be the same.        Your sensor glucose will lag behind your blood glucose.  \"Remember the roller coaster\".  Your blood sugar is always the front car and your sensor glucose is always the last car.  When blood sugar is moving up or down, the more difference there will be.          Take it easy while wearing the sensor.  Take extra care while bathing and getting dressed.  Wear loose fitting clothes on your sensor and try to avoid laying on your sensor.  You can shower/bathe with the sensor on.  But avoid submerging the sensor more than 3 feet for more than 30 minutes.  Gently pat to dry.    INITIAL SET UP:  Download the Courtney 3 vidal if using your smartphone and create an account.  The vidal will walk you through set up.  If you are " "using the Courtney 3 , turn it on and follow instructions for set up.      Courtney 3 vidal:       There will be a 60 minute warm up for each new sensor.  Each sensor should last 14 days.  Do not take more than 500mg of vitamin C (Courtney 3+ more than 1000mg) per day or this can affect sensor accuracy.    The first 12 hours of every new sensor often a little \"off\" as it gets to know your body fluids.  Set glucose alarms for highs and lows per your preference or at the recommendations of your diabetes educator.  You will not be able to silence or turn off the urgent low alert at 54 mg/dL.  If an alarm goes off, go to your vidal and acknowledge it or you will continue to get alarmed every 5 minutes.  Your Courtney 3 vidal or  will notify you when it is time to change your sensor.  Just remove it like a band aid and throw it in the trash, then place a new sensor.  It is recommended to switch arms with every sensor.    DAILY ROUTINE:  Keep your phone or  within 20 feet of you to keep data communicating with your device.  If you are away from your device for more than 20 minutes, your device will alarm.  Be curious with what the sensor data shows.  How do your food choices impact your glucose?  Exercise?  Complete a fingerstick glucose check at these times:                          -when you feel differently than the sensor indicates                          -when you are not wearing a sensor                          -when you see this symbol:     DATA SHARING:  If you want to share your sensor data with a loved one (for phone users only), send them an invitation through the Courtney 3 vidal.  They will use the [employer] vidal and accept your invitation.      [employer] vidal:   Our clinic will link to your data as well (phone users only). Under the menu (3 lines in the upper left corner), go to connected apps, then touch \"connect\" next to [employer], and then \"connect to a practice\".  Enter our practice ID \"[id number]\" " and hit connect.  Patients using the reader can upload from home via desktop or laptop computer on [employer] or will have the  downloaded in clinic.     OTHER IMPORTANT NOTES:  If the sensor is often falling off before the 14 days are up, there are products than can help.  Talk to your diabetes educator.  You can leave your Courtney sensor on for radiology scans (Xray, CT scan or MRI), however it is recommended that you use fingersticks for accurate readings during and 1 hour after an MRI as the accuracy of the sensor is questionable during this time.  Contact the  if the sensor does not last the full 14 days for any reason, or if you have any other technical problems. Keep your Courtney sensor box with the lot and serial numbers as they often ask for this information.  -Courtney customer service phone number: [phone].      -Website for courtney replacement due to sensor failure or falling off: https://www.[employer]/us-en/support/sensor-support-form-questions.html  If your copay is more than $75 a month, call the copay assistance line at 1-472.629.1088 and they will work with your pharmacy to get your cost down.  Or, you can give the pharmacy the following information:      [id number], Group: [id number], Static IDERXLIBREHCP, EXP: 12/31/25    Courtney Sensor instructions in pregnancy    Please type in your fasting sensor glucose every morning using the notes feature in your vidal.  For example: F 94 for fasting reading of 94 mg/dL.  Remember, the goal is less than 95 for morning fasting.    How to do this: click the ADD NOTE button at the bottom of your home screen, then in the comments section at the bottom, type in  F 94 , then touch DONE.    Please put in a meal marker at the start of all your MEALS (not snacks).  How to do this: click the ADD NOTE button at the bottom of your home screen, then check the box by Food, (you can put in the grams of carb if you wish), then touch DONE.    If you are  being asked to monitor your blood sugar 1 hour after a meal, set your timer for 1 hour when you start eating your meal.  When the timer goes off, go into the notes and type in 1 hr 123 for example.  How to do this: click the ADD NOTE button at the bottom of your home screen, then in the comments section at the bottom, type in  1hr 123 , then touch DONE.    You are also able to track your insulin doses under the add note feature, choosing insulin and typing in your dose(s).    Sensor glucose goals for pregnancy:  The goal sensor glucose target range for pregnancy is  mg/dL        SENSOR GLUCOSE TIME IN RANGE GOALS  Sensor glucose ranges Type 2 or GDM goals Type 1 Goals    >90% >70%   <63 <4% <4%   <54 <1% <1%   >140 Minimal as possible <25%

## 2025-01-29 NOTE — PROGRESS NOTES
Diabetes Self-Management Education & Support    Type of service:  Video Visit    If the video visit is dropped, the video visit invitation should be resent by: Text to cell phone: [phone]    Originating Location (pt. Location): Home  Distant Location (provider location): [facility]  Mode of Communication:  Video Conference via [place]    Video Start Time:  258pm  Video End Time (time video stopped): 323pm    How would patient like to obtain AVS? MyChart      Assessment  Cannot assess anything as she needs to gather some BG data.  Had Courtney in the past.  Has BGM but not testing.  Last week she did a BG in the morning 101 about an hour and 20 minutes after waking up.  This is her only recent data to report.    Tami was wearing a Courtney sensor but it fell off about a month ago and she hasn't replaced it.  She has refills at the pharmacy and is willing to go there today and get them and place a sensor.  She was having issues with false lows at night and we discussed/troubleshooted.    Asked Tami to get Courtney sensor up and running asap, put in markers for 1 hour post meal, link data to us.  Asked her to do fasting BG with fingerstick next 5 mornings.  Will find her a quick video or phone appt for follow up on Friday for close follow up (none available Friday but found one Monday morning).  She may need insulin so video visit preferred.    Tami reported swelling in 1 of her legs and asked if this could be diabetes related.  I told her no but she should tell her DR about this.    Intervention  Educational topics covered today:  none    Educational Materials provided today:  GDM book as she cannot find hers (in the process of moving)    Patient verbalized understanding of diabetes self-management education concepts discussed, opportunities for ongoing education and support, and recommendations provided today    Plan    Go to pharmacy later today and refill the Courtney sensor and get that  back up and running.  Do a fingerstick glucose the next 2 mornings and record that number.  Take it within 30 minutes of waking up and before you eat or drink anything.  Put in markers in your Courtney vidal for your post meal blood sugars (see below)  Link up your Courtney data to us (see below)    Check glucose four times daily, before breakfast and 1 hour after each meal.  Check ketones once a week when readings are consistently negative.  Continue with recommended physical activity.  Continue to follow recommended meal plan: 30-45g carbohydratess at breakfast, 45-60g carbohydratess at lunch, 45-60g carbohydrates at supper, 15-30g carbohydrates at snacks.  Follow consistent carbohydrate meal plan, eat carbohydrates and protein/fat at all meals/snacks.    If 3 or more blood sugars are above the goal at a given time, or if Ketones are small, moderate or large, call or MyChart message the diabetes educator.    Follow-up:    Follow up on Upcoming Diabetes Ed Appointments     Visit Type Date Time Department    GDM ED SHORT 1/29/2025  3:00 PM ER DIABETES ED        Subjective/Objective  Tami is an 29 year old year old, presenting for the following diabetes education related to:           Cultural Influences/Ethnic Background:  Choose not to answer      LMP 06/12/2024         Estimated Date of Delivery: Apr 16, 2025        Current Management:   Diet and exercise.      Tracy Caceres, PharmD, [facility], [facility] and [place] [facility]    Time Spent: 25 minutes  Encounter Type: Individual     Diabetes medication dose changes were made [facility] the [facility] under the patient's referring provider.

## 2025-01-29 NOTE — LETTER
1/29/2025         RE: Tami Almaraz  [address]  [place] [address]        Dear Colleague,    Thank you for referring your patient, Tami Almaraz, to the [facility]. Please see a copy of my visit note below.    Diabetes Self-Management Education & Support    Type of service:  Video Visit    If the video visit is dropped, the video visit invitation should be resent by: Text to cell phone: [phone]    Originating Location (pt. Location): Home  Distant Location (provider location): [facility]  Mode of Communication:  Video Conference via [employer]    Video Start Time:  258pm  Video End Time (time video stopped): 323pm    How would patient like to obtain AVS? MyChart      Assessment  Needs to gather some BG data.  Had Courtney in the past.  Has BGM.    Hasn't been testing BS lately.  Was wearing Courtney sensor but it fell off about a month ago and she hasn't replaced it.  She has refills at the pharmacy and is willing to go there today and get them and place a sensor.  She was having issues with false lows at night and we discussed.    Last week did a BG in the morning 101 about an hour 20 minutes after waking up.  This is her only recent data to report.    Asked Tami to get Courtney sensor up and running asap, put in markers for 1 hour post meal, link data to us.  Asked her to do fasting BG with fingerstick next 2 mornings.  Will find her a quick video or phone appt for follow up on Friday for close follow up.  She may need insulin so video visit preferred.    Tami reported swelling in 1 of her legs and asked if this could be diabetes related.  I told her no but she should tell her DR about this.    Intervention  Educational topics covered today:  none    Educational Materials provided today:  GDM book as she cannot find hers (in the process of moving)    Patient verbalized understanding of diabetes self-management education concepts discussed,  opportunities for ongoing education and support, and recommendations provided today    Plan    Go to pharmacy later today and refill the Courtney sensor and get that back up and running.  Do a fingerstick glucose the next 2 mornings and record that number.  Take it within 30 minutes of waking up and before you eat or drink anything.  Put in markers in your Courtney vidal for your post meal blood sugars (see below)  Link up your Courtney data to us (see below)    Check glucose four times daily, before breakfast and 1 hour after each meal.  Check ketones once a week when readings are consistently negative.  Continue with recommended physical activity.  Continue to follow recommended meal plan: 30-45g carbohydratess at breakfast, 45-60g carbohydratess at lunch, 45-60g carbohydrates at supper, 15-30g carbohydrates at snacks.  Follow consistent carbohydrate meal plan, eat carbohydrates and protein/fat at all meals/snacks.    If 3 or more blood sugars are above the goal at a given time, or if Ketones are small, moderate or large, call or MyChart message the diabetes educator.    Follow-up:    Follow up on Upcoming Diabetes Ed Appointments     Visit Type Date Time Department    GDM ED SHORT 1/29/2025  3:00 PM ER DIABETES ED        Subjective/Objective  Tami is an 29 year old year old, presenting for the following diabetes education related to:           Cultural Influences/Ethnic Background:  Choose not to answer      LMP 06/12/2024         Estimated Date of Delivery: Apr 16, 2025        Current Management:   Diet and exercise.      Tracy Caceres, PharmD, [facility], [facility] and [place] [facility]    Time Spent: 25 minutes  Encounter Type: Individual     Diabetes medication dose changes were made via the [facility] Standing Orders under the patient's referring provider.

## 2025-02-03 ENCOUNTER — VIRTUAL VISIT (OUTPATIENT)
Dept: EDUCATION SERVICES | Facility: CLINIC | Age: 30
End: 2025-02-03
Payer: COMMERCIAL

## 2025-02-03 ENCOUNTER — MYC MEDICAL ADVICE (OUTPATIENT)
Dept: EDUCATION SERVICES | Facility: CLINIC | Age: 30
End: 2025-02-03

## 2025-02-03 DIAGNOSIS — O24.410 DIET CONTROLLED GESTATIONAL DIABETES MELLITUS (GDM) IN SECOND TRIMESTER: Primary | ICD-10-CM

## 2025-02-03 PROCEDURE — G0108 DIAB MANAGE TRN  PER INDIV: HCPCS | Mod: 95

## 2025-02-03 NOTE — Clinical Note
Patient is struggling with frequent nausea/vomiting.  I asked her to reach out you your team to discuss further.  Please let me know if any questions.  Thank you,  Slime Kramer, MPH, RD, CDCES, LD 2/3/2025

## 2025-02-03 NOTE — PROGRESS NOTES
Diabetes and Pregnancy Follow-up  Type of Service: Video Visit/ 46 minutes     Originating Location (Patient Location): [place]  Distant Location (Provider Location): [facility]  Mode of Communication:  Video     Video Visit Start Time: 9:04 AM  Video Visit End Time: 9:51 AM     How would patient like to obtain AVS? Ashli    Subjective/Objective:    Tami Almaraz was called for a scheduled BG review. Last date of communication was: 1/29/25.    Gestational diabetes is being managed with diet and activity    Taking diabetes medications: no    Estimated Date of Delivery: Apr 16, 2025    Blood Glucose/Ketone Log:    Date Ketones Fasting Post Breakfast Post Lunch Post Supper   2/3  72 (fingerstick)      2/2  53 (sensor)/54 (fingerstick)      2/1  160 mg/dL (sensor)      1/31  135 (sensor)/122 (fingerstick)                Assessment:  Ketones: not addressed today.   Fasting blood glucoses: 20% in target.  Patient IS meeting the following glucose goals for pregnancy with most recent CGM data:   -Time below 63 mg/dL of less than 4%   -Time below 54 mg/dL of less than 1%  Patient IS NOT meeting the following glucose goals for pregnancy with most recent CGM data:   -time in  mg/dL target range of above 70%   -Time above 140 mg/dL of less than 25%    Has not consistently completed fingerstick glucose checks since last visit on 1/29/25.  Resumed Courtney 3 on 1/30/25 and it fell off on 2/2/25.  She was unaware she should contact the  for sensor replacement.  Discussed contact  with patient and provided information on AVS.  Patient reports fasting glucose usually elevated though WNL today and low yesterday.  Denies any signs or symptoms of hypoglycemia with low fasting glucose on 2/2/25.  Question if low fasting glucose related to wet hands and/or nausea/vomiting.  Asked patient to wash hands and allow to completely dry before competing fingerstick glucose check.  Patient reports usually vomiting  following taking her medications/supplements in the AM except on weekends when she goes back to sleep for about an hour after taking the medications/supplements.  Also reports vomiting following consumption of cheese.  Encouraged patient to reach out to OB team re: nausea/vomiting and to see if she can take her medications/supplements before bed.      Reports not eating regular meals due to the nausea/vomiting.  States she may have something to eat, wait 30 minutes and then eat something else because she is hungry.  Thus she reports it is difficult to note post meal glucoses. Recommended she try having 30 grams of carbohydrate + protein each of 6 times per day.  Discussed ideas.  Reviewed impact of placental hormones on glucose control especially morning blood sugars.       Plan/Response:  Contact the the Courtney  and report the sensor that did not last the full 14 days.  -Courtney customer service phone number: [phone].   -Website for courtney replacement due to sensor failure or falling off: https://www.[employer]/us-en/support/sensor-support-form-questions.html    2.  Complete fasting fingerstick glucose checks daily when you first awake for the day.  Make sure your hands are clean and dry.     3.  Document in the Courtney vidal (or on paper) your sensor fasting and 1 hour post meal glucoses.     In the Courtney vidal: Menu->Logbook->Add Note->confirm time->done->Comments->Done      For example:  F 113 = Fasting 113 mg/dL      1B 138 = 1 hour post breakfast 138 mg/dL      2L 109 = 2 hours post lunch 109 mg/dL    4.  Follow up with your OB office regarding:   -your ongoing nausea and vomiting   -if you can take your medications and supplements before bed rather than in the morning    5. Aim to have a meal or snack every 2-3 hours while awake.  Try to have about 30 grams of carbohydrate + protein at each of 6 times per day.     Example:     6 AM: 1 cup milk + 1 granola type bar such as:    OROR    9 AM: 15  [employer] + peanut butter    12 PM: sandwich (2 slices whole grain bread) with tuna salad OR egg salad, raw veggies (carrots, broccoli, cucumber)    3 PM: large apple (about 3.25 inches in diameter) + nuts (almonds, peanuts, etc)     6 PM: hamburger on a bun with green beans     9 PM: 2 slices whole grain toast + cooked eggs OR 1 cup milk + 1 whole grain toast + eggs    6.  Send [place] message on Thursday, February 6th, with an update on urine ketones and blood sugars.     7. Please call or send a [place] message:  -questions or concerns.   -3 or more blood sugars above target   -ketones that are small/moderate/large    Please call or send a [place] message with any questions or concerns.    Slime Kramer, MPH, RD, CDCES, LD    Time Spent: 46 minutes    Any diabetes medication dose changes were made via the CDE Protocol and Collaborative Practice Agreement with the patient's referring provider. A copy of this encounter was shared with the provider.

## 2025-02-03 NOTE — PATIENT INSTRUCTIONS
Contact the the Courtney  and report the sensor that did not last the full 14 days.  -Courtney customer service phone number: [phone].   -Website for courtney replacement due to sensor failure or falling off: [email or web address]/us-en/support/sensor-support-form-questions.[email or web address]    2.  Complete fasting fingerstick glucose checks daily when you first awake for the day.  Make sure your hands are clean and dry.     3.  Document in the Courtney vidal (or on paper) your sensor fasting and 1 hour post meal glucoses.     In the Courtney vidal: Menu->Logbook->Add Note->confirm time->done->Comments->Done      For example:  F 113 = Fasting 113 mg/dL      1B 138 = 1 hour post breakfast 138 mg/dL      2L 109 = 2 hours post lunch 109 mg/dL    4.  Follow up with your OB office regarding:   -your ongoing nausea and vomiting   -if you can take your medications and supplements before bed rather than in the morning    5. Aim to have a meal or snack every 2-3 hours while awake.  Try to have about 30 grams of carbohydrate + protein at each of 6 times per day.     Example:     6 AM: 1 cup milk + 1 granola type bar such as:    OROR    9 AM: 15 Ritz Crackers + peanut butter    12 PM: sandwich (2 slices whole grain bread) with tuna salad OR egg salad, raw veggies (carrots, broccoli, cucumber)    3 PM: large apple (about 3.25 inches in diameter) + nuts (almonds, peanuts, etc)     6 PM: hamburger on a bun with green beans     9 PM: 2 slices whole grain toast + cooked eggs  OR 1 cup milk + 1 whole grain toast + eggs    6.  Send [employer] message on , with an update on urine ketones and blood sugars.     7. Please call or send a [employer] message:  -questions or concerns.   -3 or more blood sugars above target   -ketones that are small/moderate/large    Slime Kramer, MPH, RD, CDCES, LD  Diabetes Education Triage Line: [phone]  Diabetes Education Appointment [phone]

## 2025-02-04 ENCOUNTER — ANCILLARY PROCEDURE (OUTPATIENT)
Dept: ULTRASOUND IMAGING | Facility: CLINIC | Age: 30
End: 2025-02-04
Attending: OBSTETRICS & GYNECOLOGY
Payer: COMMERCIAL

## 2025-02-04 DIAGNOSIS — O99.212 OBESITY AFFECTING PREGNANCY IN SECOND TRIMESTER, UNSPECIFIED OBESITY TYPE: ICD-10-CM

## 2025-02-04 DIAGNOSIS — O24.410 DIET CONTROLLED GESTATIONAL DIABETES MELLITUS (GDM) IN SECOND TRIMESTER: Primary | ICD-10-CM

## 2025-02-04 DIAGNOSIS — Z23 NEED FOR TDAP VACCINATION: ICD-10-CM

## 2025-02-04 DIAGNOSIS — R11.0 NAUSEA: ICD-10-CM

## 2025-02-04 DIAGNOSIS — O24.410 DIET CONTROLLED GESTATIONAL DIABETES MELLITUS (GDM) IN SECOND TRIMESTER: ICD-10-CM

## 2025-02-04 PROCEDURE — 76816 OB US FOLLOW-UP PER FETUS: CPT | Mod: TC | Performed by: RADIOLOGY

## 2025-02-04 RX ORDER — ONDANSETRON 8 MG/1
8 TABLET, ORALLY DISINTEGRATING ORAL EVERY 8 HOURS PRN
Qty: 30 TABLET | Refills: 1 | Status: SHIPPED | OUTPATIENT
Start: 2025-02-04

## 2025-02-05 ENCOUNTER — MYC MEDICAL ADVICE (OUTPATIENT)
Dept: EDUCATION SERVICES | Facility: CLINIC | Age: 30
End: 2025-02-05
Payer: COMMERCIAL

## 2025-02-05 DIAGNOSIS — O24.410 DIET CONTROLLED GESTATIONAL DIABETES MELLITUS (GDM) IN SECOND TRIMESTER: Primary | ICD-10-CM

## 2025-02-06 RX ORDER — SYRINGE-NEEDLE,INSULIN,0.5 ML 27GX1/2"
SYRINGE, EMPTY DISPOSABLE MISCELLANEOUS
Qty: 50 EACH | Refills: 1 | Status: SHIPPED | OUTPATIENT
Start: 2025-02-06

## 2025-02-06 NOTE — TELEPHONE ENCOUNTER
Gestational Diabetes Follow-up    Subjective/Objective:    Tami Almaraz sent in blood glucose log for review. Last date of communication was: 2/3/25.    Gestational diabetes is being managed with diet and activity    Taking diabetes medications: no    Estimated Date of Delivery: Apr 16, 2025    BG/Food Log:         Fasting this morning was 133 and fingerstick with glucose meter was 127 mg/dL.    Called and spoke with patient over the phone - discussed insulin, she is agreeable to start as none of her fasting numbers have been less than 95 for some time now. Pt reports her meal time numbers are typically less than 140 mg/dL.   She states all ketones have been negative.    At last visit patient was shown how to do insulin.   Assessment:    Ketones: negatvie.   Fasting blood glucoses: 0% in target.  Other meals not assessed today    Plan/Response:  Start 20 units of NPH at bedtime  Follow up on Monday with glucose numbers        Any diabetes medication dose changes were made via the CDE Protocol and Collaborative Practice Agreement with the patient's referring provider. A copy of this encounter was shared with the provider.

## 2025-02-11 ENCOUNTER — PRENATAL OFFICE VISIT (OUTPATIENT)
Dept: OBGYN | Facility: CLINIC | Age: 30
End: 2025-02-11
Payer: COMMERCIAL

## 2025-02-11 VITALS
BODY MASS INDEX: 44.6 KG/M2 | DIASTOLIC BLOOD PRESSURE: 66 MMHG | HEART RATE: 93 BPM | WEIGHT: 293 LBS | SYSTOLIC BLOOD PRESSURE: 115 MMHG

## 2025-02-11 DIAGNOSIS — O99.213 OBESITY AFFECTING PREGNANCY IN THIRD TRIMESTER, UNSPECIFIED OBESITY TYPE: ICD-10-CM

## 2025-02-11 DIAGNOSIS — O24.414 INSULIN CONTROLLED GESTATIONAL DIABETES MELLITUS (GDM) IN THIRD TRIMESTER: Primary | ICD-10-CM

## 2025-02-11 PROCEDURE — 99207 PR COMPLICATED OB VISIT: CPT | Performed by: GENERAL PRACTICE

## 2025-02-11 NOTE — PATIENT INSTRUCTIONS
If you have labs or imaging done, the results will automatically release in [employer] without an interpretation.  Your health care professional will review those results and send an interpretation with recommendations as soon as possible, but this may be 1-3 business days.    If you have any questions regarding your visit, please contact your care team.     [employer] Access Services: [phone]  [facility] CLINIC HOURS TELEPHONE NUMBER   Akash SOSA Camilo .      Sherry-KEISHA Summers-KEISHA Zendejas-Surgery Scheduler  Lissy-         Monday-[place]  8:00 am-4:00 pm  [facility]00 [facility]  Wednesday-[place] 8:00 am-4:00 pm  Thursday-[place] 8:00 am-4:00 pm    Typical Surgery Day Friday [facility]  [address] [place] [address]  PH: [phone] Fax    Imaging Scheduling all locations  PH: [phone]     [facility]  [address]  [place], [place] [address] [phone]    [facility]  [address] [place] [address]  PH: [phone]     **Surgeries** Our Surgery Schedulers will contact you to schedule. If you do not receive a call within 3 business days, please call [phone].  Urgent Care locations:  [facility]       Monday-Friday   10 am - 8 pm  Saturday and Sunday   9 am - 5 pm   [phone]   If you need a medication refill, please contact your pharmacy. Please allow 3 business days for your refill to be completed.  As always, Thank you for trusting us with your healthcare needs!

## 2025-02-20 ENCOUNTER — ANCILLARY PROCEDURE (OUTPATIENT)
Dept: ULTRASOUND IMAGING | Facility: CLINIC | Age: 30
End: 2025-02-20
Attending: GENERAL PRACTICE
Payer: COMMERCIAL

## 2025-02-20 ENCOUNTER — NURSE TRIAGE (OUTPATIENT)
Dept: NURSING | Facility: CLINIC | Age: 30
End: 2025-02-20

## 2025-02-20 DIAGNOSIS — O24.414 INSULIN CONTROLLED GESTATIONAL DIABETES MELLITUS (GDM) IN THIRD TRIMESTER: ICD-10-CM

## 2025-02-20 PROCEDURE — 76819 FETAL BIOPHYS PROFIL W/O NST: CPT

## 2025-02-20 NOTE — PATIENT INSTRUCTIONS
To Schedule an Appointment 24/7  Call: [phone]    If you have any questions regarding your visit, Please contact your care team.  [facility] Access Services: [phone]  [facility] CLINIC HOURS TELEPHONE NUMBER   Cephas Agbeh, M.D.      Kamilah Zendejas-Surgery Scheduler  Lissy-Surgery Scheduler       Monday - [place]:    8:00 am-4:45 pm  Tuesday - [place]:   8:00 am-4:45 pm  Friday-[place]:       8:00 am-4:45 pm  Typical Surgery Day:  Wednesday [facility]   [address] [place] [address]   [phone]   Fax [phone]    Imaging Scheduling all locations  [phone]      [facility]   [address] [place] [address] [place] [address]   [phone]    [facility]  [address] [address]  [phone]  Fax [phone]   Urgent Care locations:  [facility] Monday-Friday                               10 am - 8 pm  Saturday and Sunday                      9 am - 5 pm  Monday-Friday                              10 am- 8 pm  Saturday and Sunday                      9 am - 5 pm    [phone]   **Surgeries** Our Surgery Schedulers will contact you to schedule. If you do not receive a call within 3 business days, please call [phone].  If you need a medication refill, please contact your pharmacy. Please allow 3 business days for your refill to be completed.  As always, Thank you for trusting us with your healthcare needs!  see additional instructions from your care team below   Weeks 32 to 34 of Your Pregnancy: Care Instructions    Decide whether you want to bank or donate your baby's umbilical cord blood. If you want to save this blood, you have to arrange for it ahead of time.   Decide about circumcision. Personal, Mandaen, or cultural beliefs may play a role in your decision. You get to decide what you want for your baby.        "  Learn how to ease hemorrhoids.   Get more liquids, fruits, vegetables, and fiber in your diet.  Avoid sitting for too long.  Clean yourself with moist toilet paper. Or try witch hazel pads.  Try ice packs or warm sitz baths for discomfort.  Use hydrocortisone cream for pain or itching.  Ask your doctor about stool softeners.        Consider the benefits of breastfeeding.   It reduces your baby's risk of sudden infant death syndrome (SIDS).   babies are less likely to get certain infections. And they're less likely to be obese or get diabetes later in life.  It can lower your risk of breast and ovarian cancers and osteoporosis.  It saves you money.  Follow-up care is a key part of your treatment and safety. Be sure to make and go to all appointments, and call your doctor if you are having problems. It's also a good idea to know your test results and keep a list of the medicines you take.  Where can you learn more?  Go to https://www.Joystickers.net/patiented  Enter X711 in the search box to learn more about \"Weeks 32 to 34 of Your Pregnancy: Care Instructions.\"  Current as of: April 30, 2024  Content Version: 14.3    2024 [employer].   Care instructions adapted under license by your healthcare professional. If you have questions about a medical condition or this instruction, always ask your healthcare professional. [employer] disclaims any warranty or liability for your use of this information.    "

## 2025-02-21 NOTE — TELEPHONE ENCOUNTER
"OB Triage Call    Is patient's OB/Midwife with the [facility] or [facility]? [facility]- Proceed with triage     Reason for call: Vomiting - headache    Assessment: Since this morning she has been feeling very exhausted. She was falling asleep while driving. She has been vomiting everything up since 4:30pm. Headache - 3/10. She feels weak. Nausea. Yellow emesis. Earlier she was having severe eye pain in her left eye. She has vomited 2 times so far today.     Plan: Per Protocol - ED now.     Patient plans to deliver at [facility]    Patient's primary OB Provider is Dr Agbeh.      Per protocol recommendations Patient to be evaluated in L&D. Patient's primary OB is [facility].  Labor and delivery at [facility] ([phone]) notified of patient's pending arrival.  Report given to Evelin.    Is patient's delivering hospital on divert? No      32w1d    Estimated Date of Delivery: 2025        OB History    Para Term  AB Living   4 1 1 0 2 1   SAB IAB Ectopic Multiple Live Births   1 1 0 0 1      # Outcome Date GA Lbr Alex/2nd Weight Sex Type Anes PTL Lv   4 Current            3 IAB 2020     IAB      2 SAB 18 9w6d    SAB      1 Term 17 40w2d 11:28 / 00:15 2.665 kg (5 lb 14 oz) F Vag-Spont IV, EPI N KOJO      Name: KATHIA,BABY GIRL      Apgar1: 8  Apgar5: 9       No results found for: \"GBS\"       Day Lea RN   Reason for Disposition   Vomiting is main symptom   Severe pain in one eye    Additional Information   Negative: SEVERE difficulty breathing (e.g., struggling for each breath, speaks in single words)   Negative: Shock suspected (e.g., cold/pale/clammy skin, too weak to stand, low BP, rapid pulse)   Negative: Difficult to awaken or acting confused (e.g., disoriented, slurred speech)   Negative: [1] Fainted > 15 minutes ago AND [2] still feels too weak or dizzy to stand   Negative: [1] SEVERE weakness (i.e., unable to walk or barely able to walk, requires support) AND " "[2] new-onset or worsening   Negative: Sounds like a life-threatening emergency to the triager   Negative: Weakness of the face, arm or leg on one side of the body   Negative: Shock suspected (e.g., cold/pale/clammy skin, too weak to stand, low BP, rapid pulse)   Negative: Difficult to awaken or acting confused (e.g., disoriented, slurred speech)   Negative: Sounds like a life-threatening emergency to the triager   Negative: [1] Vomiting AND [2] contains red blood or black (\"coffee ground\") material  (Exception: Few red streaks in vomit that only happened once.)   Negative: Recent head injury (within last 3 days)   Negative: Recent abdominal injury (within last 3 days)   Negative: [1] Insulin-dependent diabetes (Type I) AND [2] glucose > 400 mg/dl (22 mmol/l)    Protocols used: Weakness (Generalized) and Fatigue-A-AH, Vomiting-A-AH    "

## 2025-02-25 ENCOUNTER — TELEPHONE (OUTPATIENT)
Dept: EDUCATION SERVICES | Facility: CLINIC | Age: 30
End: 2025-02-25

## 2025-02-25 ENCOUNTER — VIRTUAL VISIT (OUTPATIENT)
Dept: EDUCATION SERVICES | Facility: CLINIC | Age: 30
End: 2025-02-25
Payer: COMMERCIAL

## 2025-02-25 ENCOUNTER — PRENATAL OFFICE VISIT (OUTPATIENT)
Dept: OBGYN | Facility: CLINIC | Age: 30
End: 2025-02-25
Payer: COMMERCIAL

## 2025-02-25 VITALS — SYSTOLIC BLOOD PRESSURE: 110 MMHG | DIASTOLIC BLOOD PRESSURE: 67 MMHG | OXYGEN SATURATION: 96 % | HEART RATE: 84 BPM

## 2025-02-25 DIAGNOSIS — O99.213 OBESITY AFFECTING PREGNANCY IN THIRD TRIMESTER, UNSPECIFIED OBESITY TYPE: ICD-10-CM

## 2025-02-25 DIAGNOSIS — O24.414 INSULIN CONTROLLED GESTATIONAL DIABETES MELLITUS (GDM) IN THIRD TRIMESTER: Primary | ICD-10-CM

## 2025-02-25 PROCEDURE — 3078F DIAST BP <80 MM HG: CPT | Performed by: OBSTETRICS & GYNECOLOGY

## 2025-02-25 PROCEDURE — 99207 PR PRENATAL VISIT: CPT | Performed by: OBSTETRICS & GYNECOLOGY

## 2025-02-25 PROCEDURE — G0108 DIAB MANAGE TRN  PER INDIV: HCPCS | Mod: 95 | Performed by: DIETITIAN, REGISTERED

## 2025-02-25 PROCEDURE — 3074F SYST BP LT 130 MM HG: CPT | Performed by: OBSTETRICS & GYNECOLOGY

## 2025-02-25 PROCEDURE — 0502F SUBSEQUENT PRENATAL CARE: CPT | Performed by: OBSTETRICS & GYNECOLOGY

## 2025-02-25 PROCEDURE — 59025 FETAL NON-STRESS TEST: CPT | Performed by: OBSTETRICS & GYNECOLOGY

## 2025-02-25 RX ORDER — ACYCLOVIR 400 MG/1
1 TABLET ORAL
Qty: 9 EACH | Refills: 0 | Status: SHIPPED | OUTPATIENT
Start: 2025-02-25

## 2025-02-25 NOTE — TELEPHONE ENCOUNTER
[facility]    Phone Message    May a detailed message be left on voicemail: yes     Reason for Call: Patient calling to make an appt and earliest she could get was 3/4/2025. Her OB doctor stated to her that she needs to be seen ASAP due to: [place] and Dizziness and Fatigue- and needs adjustment on insulin.     Action Taken: Message routed to:  [facility] Diab ed    Travel Screening: Not Applicable     Date of Service:

## 2025-02-25 NOTE — PROGRESS NOTES
"32w6d   patient complains of some dizzy spells with headaches that bother her.  He has fatigue though she sleeps a lot.  Some some nausea.  She has noticed some small ketones in the urine.  Patient admits that she has not called her blood sugars  since she was started on insulin about 3 weeks ago.  I stressed to her the importance of calling the diabetic educator because she might need adjustments in her insulin.  The telephone number for  the to diabetic educator today was provided to her.  She is scheduled for biophysical profile this week.  RTC 2 wk/prn.  She had a reactive NST today.      Vital signs:      BP: 110/67 Pulse: 84     SpO2: 96 %          Estimated body mass index is 44.6 kg/m  as calculated from the following:    Height as of 6/14/24: 1.727 m (5' 8\").    Weight as of 2/11/25: 133 kg (293 lb 4.8 oz).        ICD-10-CM    1. Insulin controlled gestational diabetes mellitus (GDM) in third trimester  O24.414 FETAL NON-STRESS TEST      2. Obesity affecting pregnancy in third trimester, unspecified obesity type  O99.213 FETAL NON-STRESS TEST        NST IS:  Reactive (2 accl > 15 BPM in 20 min., each lasting approx. 15 seconds)  NST Baseline Rate 140s  Variability:  Average  Accelerations:Present  Variable Decelerations:No  Other Decelerations:No    ICD-10-CM    1. Insulin controlled gestational diabetes mellitus (GDM) in third trimester  O24.414 FETAL NON-STRESS TEST      2. Obesity affecting pregnancy in third trimester, unspecified obesity type  O99.213 FETAL NON-STRESS TEST          Narrative & Impression   Biophysical profile     Comparisons: 2/4/2025, 12/31/2024     HISTORY: Gestational diabetes     FINDINGS:     Fetal breathing movements: 2/2  Gross body movements: 2/2  Fetal tone: 2/2  Amniotic fluid volume: 2/2     Total: 8/8     Amniotic fluid maximal vertical pocket: 6.2 cm     Fetal heart rate: 147 beats per minute     Fetal position: Cephalic     Placenta location: Posterior                     "                                                  IMPRESSION:     1. Normal biophysical profile.  2. Cephalic presentation

## 2025-02-25 NOTE — PROGRESS NOTES
Diabetes and Pregnancy Follow-up  Type of Service: Video Visit/ 45 minutes     Originating Location (Patient Location): [facility]  Distant Location (Provider Location): [facility]  Mode of Communication:  Video     Video Visit Start Time: 2:52 PM  Video Visit End Time: 3:37 PM     How would patient like to obtain AVS? Ashli      Subjective/Objective:    Tami Almaraz was called for a scheduled BG review. Last date of communication was: 2/6/25.    Gestational diabetes is being managed with medications    Taking diabetes medications: yes:     Diabetes Medication(s)       Insulin       insulin  UNIT/ML vial Inject 20 Units subcutaneously at bedtime. (Missed twice in the past week due to falling asleep)            Estimated Date of Delivery: Apr 16, 2025    Blood Glucose/Ketone Log (fingerstick glucoses):    Date Ketones Fasting Post Breakfast Post Lunch Post Supper   2/25 15 98 107 (45 min after)             2/20  97      2/19  98      2/18  98              2/11  99        Assessment:  Ketones: small x1.   Fasting blood glucoses: 0% in target.    Minimal recent glucose data. States she stopped using Courtney CGM as it was falling off after 4 days.  Reports she is not able to complete fingerstick glucose testing due to her work as a .  Discussed with patient the need to increase glucose monitoring.  Initiated discussion on sensor adhesion strategies and patient is not willing to try any of the strategies.  Patient reiterates in inability to complete fingerstick glucose monitoring.  Patient expresses significant concern about nausea/vomiting, dizziness, and fatigue.  Per conversation with patient, her nausea/vomiting does tend to follow taking her medications/supplements.  Discussed the need for blood glucose data in order to evaluate if dizziness and/or fatigue related to glycemic control.  Again, patient expressed her inability to complete this.  Discussed the need for her  "participation in glucose monitoring and the options: 1) try sensor adhesion strategies with Courtney CGM, 2) complete fingerstick glucose monitoring, and 3) try Dexcom G7 personal CGM.  Patient agreed to try Dexcom G7 and a prescription for the sensors was electronically sent her pharmacy.  She was instructed to follow up with OB and/or PCP to discuss safety concerns regarding dizziness and fatigue especially with her occupation as a .     Plan/Response:  Increase the NPH insulin at bedtime to 22 units.     Keep something with you for treating a low blood sugar (below 60-80 mg/dL).  If your blood sugar is low, eat/drink ONE of the following:   -1/2 cup juice  -1/2 cup regular soda  -1 package fruit snacks  -4 glucose tablets  Then, wait 15 minutes and re-check blood sugar.  If it is not above 70 mg/dL, repeat the above.    Virtual Follow up with Slime on Thursday, February 27th at 12:15 PM    INITIAL DEXCOM G7 SET UP:  Download the Dexcom G7 vidal if using your smartphone and create an account.  The vidal will walk you through set up.  If you are using the Dexcom G7 , turn it on and follow instructions for set up.  Dexcom G7 vidal:    There will be a 30 minute warm up for each new sensor and each sensor should last 10 days.  It is recommended to use the provided overtape with each sensor.  You can get more overtape free from Dexcom.  The sensor readings in the first 12 hours of every new sensor are sometimes a little \"off\" as it gets to know your body fluids.  Set glucose alarms for highs and lows per your preference or at the recommendations of your diabetes educator.  You will not be able to silence or turn off the urgent low alert at 54 mg/dL.  If an alarm goes off, go to your vidal and hit \"OK\" or it will continue to alarm every 5 minutes.  Your Dexcom vidal or  will notify you when it is time to change your sensor.  Just remove it like a band-aid and throw it in the trash, then place a new " sensor but rotate your sites.   If you need help with initial set up, contact [facility]: [phone].    DAILY ROUTINE:  Keep your phone or  within 20 feet of you to keep data communicating with your device.  If you are away from your device for more than 20 minutes, your device will alarm.  Be curious with what the sensor data shows.  How do your food choices impact your glucose?  Exercise?  Complete a fingerstick glucose check at these times:                          -when you feel differently than the sensor indicates                          -when you are not wearing a sensor                          -when you do not see a trend arrow with your sensor glucose reading    DATA SHARING:  Our clinic will link to your data as well (phone users only). To do this, go in your [employer] vidal and under the connections tab at the bottom, tap [facility] clinic and then continue.  Enter our clinic code of [id number] and confirm the sharing with our [facility], and then click done.    Dexcom Sensor instructions in pregnancy    Each day you will need to document your 4 blood sugar readings. You can do this by writing them on the paper log book or documenting on your Dexcom vidal. Please be clear with your documentation. Clarify if BG is fasting and 1 OR 2 hours post meal by using the notes feature in your vidal (if using the sensor).    How to do this: click the  +  button at the top right of your home screen, then click note, enter meal/fasting, 1 or 2 hours (for post-meal), BG, then touch save, then confirm.    Examples on how to do this:           1. Fasting BG. If your fasting BG is 94 you will document it as follows:               -Click the  +  button at the top right of your home screen, then click note, enter                       F 94 then touch save, then confirm.            2. If you're BG is 123 2 hours after breakfast you will document it as follows:               -Click the  +  button at the top right of  your home screen, then click note, enter                       2B 123 then touch save, then confirm.    Please call or send a [place] message with any questions or concerns or low blood sugars.    Slime Kramer, MPH, RD, CDCES, LD 2/27/2025    Time Spent: 45 minutes    Any diabetes medication dose changes were made via the CDE Protocol and Collaborative Practice Agreement with the patient's referring provider. A copy of this encounter was shared with the provider.

## 2025-02-25 NOTE — Clinical Note
This is the summary of my visit with patient on 2/25/25 (Tuesday).  I am scheduled for a virtual visit with patient at 12:15 PM today (2/27/25).   Thank you, Slime Kramer, MPH, RD, CDCES, LD 2/27/2025

## 2025-02-25 NOTE — Clinical Note
2/25/2025         RE: Tami Almaraz  [address]  [place] [address]        Dear Colleague,    Thank you for referring your patient, Tami Almaraz, to the [facility]. Please see a copy of my visit note below.    Diabetes and Pregnancy Follow-up  Type of Service: Video Visit/ 45 minutes     Originating Location (Patient Location): [facility]  Distant Location (Provider Location): [facility]  Mode of Communication:  Video     Video Visit Start Time: 2:52 PM  Video Visit End Time: ***     How would patient like to obtain AVS? MyChart***      Subjective/Objective:    Tami Almaraz was called for a scheduled BG review. Last date of communication was: 2/6/25.    Gestational diabetes is being managed with medications    Taking diabetes medications: yes:     Diabetes Medication(s)       Insulin       insulin  UNIT/ML vial Inject 20 Units subcutaneously at bedtime. (Missed twice in the past week)            Estimated Date of Delivery: Apr 16, 2025    Blood Glucose/Ketone Log:    Date Ketones Fasting Post Breakfast Post Lunch Post Supper   2/25 15 98 107 (45 min after)             2/20  97      2/19  98      2/18  98              2/11  99        Assessment:  Ketones: small x1.   Fasting blood glucoses: 0% in target.        Falling asleep and missing insulin b/c not feeling well.   Up for day at 5 AM  Plan/Response:  {:[id number]}    ***  Time Spent: *** minutes    Any diabetes medication dose changes were made via the CDE Protocol and Collaborative Practice Agreement with the patient's referring provider. A copy of this encounter was shared with the provider.

## 2025-02-26 ENCOUNTER — TELEPHONE (OUTPATIENT)
Dept: OBGYN | Facility: CLINIC | Age: 30
End: 2025-02-26
Payer: COMMERCIAL

## 2025-02-26 NOTE — TELEPHONE ENCOUNTER
----- Message from Cephas Mawuena Agbeh sent at 2/26/2025  1:20 PM CST -----   Please see note Message from Diabetic Ed.   Please schedule patient to see me in the office on 2/28/25 (double book if needed)   In the meantime ,She should go to ED/UC /L&D if her N&V is not improving today..  CEPHAS AGBEH, MD.

## 2025-02-26 NOTE — TELEPHONE ENCOUNTER
Called spoke with patient, pt is currently on her way to the hospital at this time.   She is scheduled to see Dr. Agbeh on Friday at 11:30am.    Celia SMITH RN -  OB/GYN group

## 2025-02-26 NOTE — TELEPHONE ENCOUNTER
Calling patient to inform her of provider recommendations to go to [facility] for assessment/evaluation.     33w0d      Dr. Agbeh requesting patient to go to [facility] for assessment/evaluation of blood sugars, ketones in urine, frequent N/V, dizziness and fatigue.     Spoke with patient who agree to go in to [facility]  ETA 90 minutes.     Called spoke with/informed on call provider Dr. Reilly.   Called spoke with charge nurse on [facility].     Celia SMITH RN -  OB/GYN group

## 2025-02-27 ENCOUNTER — MYC MEDICAL ADVICE (OUTPATIENT)
Dept: EDUCATION SERVICES | Facility: CLINIC | Age: 30
End: 2025-02-27

## 2025-02-27 ENCOUNTER — VIRTUAL VISIT (OUTPATIENT)
Dept: EDUCATION SERVICES | Facility: CLINIC | Age: 30
End: 2025-02-27
Payer: COMMERCIAL

## 2025-02-27 DIAGNOSIS — O24.410 DIET CONTROLLED GESTATIONAL DIABETES MELLITUS (GDM) IN SECOND TRIMESTER: ICD-10-CM

## 2025-02-27 DIAGNOSIS — O24.414 INSULIN CONTROLLED GESTATIONAL DIABETES MELLITUS (GDM) IN THIRD TRIMESTER: Primary | ICD-10-CM

## 2025-02-27 RX ORDER — LANCETS
EACH MISCELLANEOUS
Qty: 100 EACH | Refills: 6 | Status: SHIPPED | OUTPATIENT
Start: 2025-02-27

## 2025-02-27 NOTE — PROGRESS NOTES
Diabetes and Pregnancy Follow-up  Type of Service: Video Visit/ 39 minutes     Originating Location (Patient Location): [place]  [place] [facility] [place] of Communication:  Video     Video Visit Start Time: 12:16 PM  Video Visit End Time: 12:55 PM     How would patient like to obtain AVS? Ashli      Subjective/Objective:    Tami Almaraz was called for a scheduled BG review. Last date of communication was: 2/25/25.    Gestational diabetes is being managed with medications    Taking diabetes medications: yes:     Diabetes Medication(s)       Insulin       insulin  UNIT/ML vial Inject 22 Units subcutaneously at bedtime.            Estimated Date of Delivery: Apr 16, 2025    Blood Glucose/Ketone Log:    Date Ketones Fasting Post Breakfast Post Lunch Post Supper Bedtime   2/25     99 (45 min after) 104 (1 hour post snack)   2/26  112    180   2/27  101 184 (Poptart)  173 (Leon: kirk egg & cheese bagel; small cookies and cream drink)     Testing 1 hour after meal unless noted    Assessment:  Ketones: not assessed today.   Fasting blood glucoses: 0% in target.  After breakfast: 0% in target.  Before lunch: n/a% in target.  After lunch: 0% in target.  Before dinner: n/a% in target.  After dinner: n/a% in target.    Insufficient glucose data in the past 48 hours due to incomplete fingerstick glucose checking and that she is not able to  her Dexcom sensors until 3/1/25 due to insurance.  Continues to express concern and frustration regarding dizziness and lightheadedness.  Reports these occurred today while laying down for her ultrasound.  Reports she did not alert the provider.  She was encouraged to document day, time, and circumstances surrounding episodes of dizziness and lightheadedness and follow up with OB and/or PCP. Post meal glucoses today, 2/27/25, elevated and thus possible high blood sugars contributing to fatigue.  Reports familiarity with carbohydrate  counting and that she is not doing it.  Showed patient the amount of carbohydrate in her beverage choice alone from [employer] today (78 grams).  Recommended patient work on portion sizes of carbohydrates at meals.  Insufficient glucose data today to recommend starting meal time insulin.        Plan/Response:  Patient Instructions   Test glucose 4 times per day:   Fasting (when you first awake for the day): 95 mg/dL or below   1 hour after breakfast: 140 mg/dL or below   1 hour after lunch: 140 mg/dL or below   1 hour after dinner: 140 mg/dL or below     Please bring your meter and log book to all appointments     If you miss 1 hour after meal test, test 2 hours after the meal.  Goal 2 hours after is 120 mg/dL or below.     2.  Check your urine ketones once a day, when you first awake for the day until they are negative to trace for 7 days in a row.  Then decrease and check once a week.     3.  Meal Plan  Breakfast: 30 grams carbohydrate + protein    Examples: 2 slices whole grain bread + peanut butter and/or prepared eggs + water OR 8 oz cow's milk + 1 slice whole grain bread + peanut butter and/or prepared eggs    Snack: 15-30 grams carbohydrate + protein   Example: 1 [facility] [employer] Protein bar      Lunch: 45-60 grams carbohydrate + protein   Example: 2 slices whole grain bread with peanut butter + 1 individual Chobani Less sugar Greek yogurt + raw vegetables with light ranch + 1/2 cup fruit canned in it's own juice    Snack: 15-30 grams carbohydrate + protein   Example: 1 small apple + 1 stick of mozzarella string cheese    Dinner: 45-60 grams carbohydrate + protein   Examples:     -baked pork chop (https://[employer]/easy-baked-pork-chops/)+ broccoli + 1 cup sweet potato + 1 tennis ball size apple OR individual container of unsweetened applesauce              -taco salad (greens + 1/2 cup black beans + seasoned ground turkey + cheese + tomatoes/onions/bell peppers + salsa) + 1 cup cow's milk + 1 cup whole  berries              -baked tilapia (https://www.[employer]/easy-baked-tilapia/) + steamed green beans (try the steam in the microwave bag of frozen vegetables) +  1 cup wild rice + 1 tennis ball size piece of fruit    Snack: 15-30 grams carbohydrate + protein   Example: 8 oz chocolate Fairlife milk     A few tips:   -consume some carbohydrate every 2-3 hours while awake   -you need a minimum of 175 grams of carbohydrate per day   -fruit and cold breakfast cereal are best tolerated at lunch or later   -protein includes: cheese, eggs, fish, nuts, nut butter, chicken, turkey, beef, and pork   -snack ideas: an individual container of Greek yogurt (try Chobani Less Sugar), whole grain crackers and cheese, chocolate fairlife milk, a Kashi or KIND bar, a baseball size piece of whole fruit + nut butter (apple + peanut butter), fruit canned in it's own juice + cottage cheese    4.  Aim for 20-30 minutes of activity most days of the week (with the okay of your OB provider).      5.  Increase NPH insulin to 24 units daily at bedtime.    6.  Keep something with you for treating a low blood sugar (below 60-80 mg/dL).  If your blood sugar is low, eat/drink ONE of the following:   -1/2 cup juice  -1/2 cup regular soda  -1 package fruit snacks  -4 glucose tablets  Then, wait 15 minutes and re-check blood sugar.  If it is not above 70 mg/dL, repeat the above.    7.  Follow up: Monday, March 3rd at 9:30 AM    8.  Call Diabetes Education at [phone] or send a [employer] message with:   -questions or concerns   -ketones that are small, moderate, or large   -3 or more blood sugars above target in a 7 day period   -any low blood sugars    Slime Kramer, MPH, RD, CDCES, LD 2/27/2025    Time Spent: 39 minutes    Any diabetes medication dose changes were made via the CDE Protocol and Collaborative Practice Agreement with the patient's referring provider. A copy of this encounter was shared with the provider.

## 2025-02-27 NOTE — Clinical Note
Hi Dr. Agbeh,  See my note for details.  It's possible that she's experiencing post meal hyperglycemia which is contributing to her fatigue.  I didn't have sufficient glucose data to start mealtime insulin today so worked on meal planning.  She reported dizziness and lightheadedness while laying down during her ultrasound - she did not notify the provider when it was occurring. I have another follow up visit with her on Monday.  Let me know if any questions/concerns.  Thank you,  Slime Kramer, MPH, RD, CDCES, LD 2/27/2025

## 2025-02-27 NOTE — PATIENT INSTRUCTIONS
"Increase the NPH insulin at bedtime to 22 units.     Keep something with you for treating a low blood sugar (below 60-80 mg/dL).  If your blood sugar is low, eat/drink ONE of the following:   -1/2 cup juice  -1/2 cup regular soda  -1 package fruit snacks  -4 glucose tablets  Then, wait 15 minutes and re-check blood sugar.  If it is not above 70 mg/dL, repeat the above.    Virtual Follow up with Slime on Thursday, February 27th at 12:15 PM    INITIAL DEXCOM G7 SET UP:  Download the Dexcom G7 vidal if using your smartphone and create an account.  The vidal will walk you through set up.  If you are using the Dexcom G7 , turn it on and follow instructions for set up.  Dexcom G7 vidal:    There will be a 30 minute warm up for each new sensor and each sensor should last 10 days.  It is recommended to use the provided overtape with each sensor.  You can get more overtape free from Dexcom.  The sensor readings in the first 12 hours of every new sensor are sometimes a little \"off\" as it gets to know your body fluids.  Set glucose alarms for highs and lows per your preference or at the recommendations of your diabetes educator.  You will not be able to silence or turn off the urgent low alert at 54 mg/dL.  If an alarm goes off, go to your vidal and hit \"OK\" or it will continue to alarm every 5 minutes.  Your Dexcom vidal or  will notify you when it is time to change your sensor.  Just remove it like a band-aid and throw it in the trash, then place a new sensor but rotate your sites.   If you need help with initial set up, contact [employer] [facility]: [phone].    DAILY ROUTINE:  Keep your phone or  within 20 feet of you to keep data communicating with your device.  If you are away from your device for more than 20 minutes, your device will alarm.  Be curious with what the sensor data shows.  How do your food choices impact your glucose?  Exercise?  Complete a fingerstick glucose check at these times:                "           -when you feel differently than the sensor indicates                          -when you are not wearing a sensor                          -when you do not see a trend arrow with your sensor glucose reading    DATA SHARING:  Our clinic will link to your data as well (phone users only). To do this, go in your [employer] vidal and under the connections tab at the bottom, tap clarity clinic and then continue.  Enter our clinic code of [id number] and confirm the sharing with our [facility], and then click done.    Dexcom Sensor instructions in pregnancy    Each day you will need to document your 4 blood sugar readings. You can do this by writing them on the paper log book or documenting on your Dexcom vidal. Please be clear with your documentation. Clarify if BG is fasting and 1 OR 2 hours post meal by using the notes feature in your vidal (if using the sensor).    How to do this: click the  +  button at the top right of your home screen, then click note, enter meal/fasting, 1 or 2 hours (for post-meal), BG, then touch save, then confirm.    Examples on how to do this:           1. Fasting BG. If your fasting BG is 94 you will document it as follows:               -Click the  +  button at the top right of your home screen, then click note, enter                       F 94 then touch save, then confirm.            2. If you're BG is 123 2 hours after breakfast you will document it as follows:               -Click the  +  button at the top right of your home screen, then click note, enter                       2B 123 then touch save, then confirm.    Please call or send a [employer] message with any questions or concerns or low blood sugars.    Slime Kramer, MPH, RD, NA, LD  Diabetes Education Triage Line: [phone]  Diabetes Education Appointment [phone]

## 2025-02-28 NOTE — PATIENT INSTRUCTIONS
Test glucose 4 times per day:   Fasting (when you first awake for the day): 95 mg/dL or below   1 hour after breakfast: 140 mg/dL or below   1 hour after lunch: 140 mg/dL or below   1 hour after dinner: 140 mg/dL or below     Please bring your meter and log book to all appointments     If you miss 1 hour after meal test, test 2 hours after the meal.  Goal 2 hours after is 120 mg/dL or below.     2.  Check your urine ketones once a day, when you first awake for the day until they are negative to trace for 7 days in a row.  Then decrease and check once a week.     3.  Meal Plan  Breakfast: 30 grams carbohydrate + protein    Examples: 2 slices whole grain bread + peanut butter and/or prepared eggs + water OR 8 oz cow's milk + 1 slice whole grain bread + peanut butter and/or prepared eggs    Snack: 15-30 grams carbohydrate + protein   Example: 1 [employer] Protein bar      Lunch: 45-60 grams carbohydrate + protein   Example: 2 slices whole grain bread with peanut butter + 1 individual Chobani Less sugar Greek yogurt + raw vegetables with light ranch + 1/2 cup fruit canned in it's own juice    Snack: 15-30 grams carbohydrate + protein   Example: 1 small apple + 1 stick of mozzarella string cheese    Dinner: 45-60 grams carbohydrate + protein   Examples:     -baked pork chop (https://[employer]/easy-baked-pork-chops/)+ broccoli + 1 cup sweet potato + 1 tennis ball size apple OR individual container of unsweetened applesauce              -taco salad (greens + 1/2 cup black beans + seasoned ground turkey + cheese + tomatoes/onions/bell peppers + salsa) + 1 cup cow's milk + 1 cup whole berries              -baked tilapia (https://www.[employer]/easy-baked-tilapia/) + steamed green beans (try the steam in the microwave bag of frozen vegetables) +  1 cup wild rice + 1 tennis ball size piece of fruit    Snack: 15-30 grams carbohydrate + protein   Example: 8 oz chocolate Fairlife milk     A few  tips:   -consume some carbohydrate every 2-3 hours while awake   -you need a minimum of 175 grams of carbohydrate per day   -fruit and cold breakfast cereal are best tolerated at lunch or later   -protein includes: cheese, eggs, fish, nuts, nut butter, chicken, turkey, beef, and pork   -snack ideas: an individual container of Greek yogurt (try Chobani Less Sugar), whole grain crackers and cheese, chocolate fairlife milk, a Kashi or KIND bar, a baseball size piece of whole fruit + nut butter (apple + peanut butter), fruit canned in it's own juice + cottage cheese    4.  Aim for 20-30 minutes of activity most days of the week (with the okay of your OB provider).      5.  Increase NPH insulin to 24 units daily at bedtime.    6.  Keep something with you for treating a low blood sugar (below 60-80 mg/dL).  If your blood sugar is low, eat/drink ONE of the following:   -1/2 cup juice  -1/2 cup regular soda  -1 package fruit snacks  -4 glucose tablets  Then, wait 15 minutes and re-check blood sugar.  If it is not above 70 mg/dL, repeat the above.    7.  Follow up: Monday, March 3rd at 9:30 AM    8.  Call Diabetes Education at [phone] or send a [employer] message with:   -questions or concerns   -ketones that are small, moderate, or large   -3 or more blood sugars above target in a 7 day period   -any low blood sugars    Thank you,     Slime Kramer, MPH, RD, CDCES, LD 2/27/2025

## 2025-03-03 ENCOUNTER — VIRTUAL VISIT (OUTPATIENT)
Dept: EDUCATION SERVICES | Facility: CLINIC | Age: 30
End: 2025-03-03
Payer: COMMERCIAL

## 2025-03-03 DIAGNOSIS — O24.410 DIET CONTROLLED GESTATIONAL DIABETES MELLITUS (GDM) IN SECOND TRIMESTER: ICD-10-CM

## 2025-03-03 DIAGNOSIS — O24.414 INSULIN CONTROLLED GESTATIONAL DIABETES MELLITUS (GDM) IN THIRD TRIMESTER: ICD-10-CM

## 2025-03-03 PROCEDURE — 99207 PR NO BILLABLE SERVICE THIS VISIT: CPT | Mod: 95

## 2025-03-03 RX ORDER — INSULIN ASPART 100 [IU]/ML
6 INJECTION, SOLUTION INTRAVENOUS; SUBCUTANEOUS 2 TIMES DAILY WITH MEALS
Qty: 15 ML | Refills: 1 | Status: SHIPPED | OUTPATIENT
Start: 2025-03-03 | End: 2025-03-06

## 2025-03-03 RX ORDER — PEN NEEDLE, DIABETIC 30 GX3/16"
1 NEEDLE, DISPOSABLE MISCELLANEOUS 2 TIMES DAILY
Qty: 100 EACH | Refills: 1 | Status: SHIPPED | OUTPATIENT
Start: 2025-03-03 | End: 2025-03-05

## 2025-03-03 RX ORDER — BLOOD PRESSURE TEST KIT
1 KIT MISCELLANEOUS 2 TIMES DAILY
Qty: 200 EACH | Refills: 1 | Status: SHIPPED | OUTPATIENT
Start: 2025-03-03

## 2025-03-03 NOTE — PROGRESS NOTES
"Diabetes and Pregnancy Follow-up  Type of Service: Video Visit/ 24 minutes     Originating Location (Patient Location): [place], [place]  Distant Location (Provider Location): [place] [facility]  Mode of Communication:  Video     Video Visit Start Time: 9:43 AM  Video Visit End Time: 10:07 AM     How would patient like to obtain AVS? Ashli      Subjective/Objective:    Tami Almaraz connected for a scheduled BG review. Last date of communication was: 2/27/25.    Gestational diabetes is being managed with medications    Taking diabetes medications: yes:     Diabetes Medication(s)       Insulin       insulin  UNIT/ML vial Inject 24 Units subcutaneously at bedtime.            Estimated Date of Delivery: Apr 16, 2025    Blood Glucose/Ketone Log:    Date Ketones Fasting Post Breakfast Post Lunch Post Supper Before Bed   2/27     213    2/28  116 148  222    3/1  103 183  Above 200    3/2      183   3/3  153       Testing 1 hour post meal      Assessment:  Ketones: not assessed today.   Fasting blood glucoses: 0% in target.  After breakfast: 0% in target.  Before lunch: n/a% in target.  After lunch: n/a% in target.  Before dinner: n/a% in target.  After dinner: 0% in target.    Patient states she is \"over\" all of it and has decided to complete fingerstick glucose monitoring and not use personal CGM.  Recommended patient start rapid acting insulin before breakfast and before dinner meals given her elevated post meal glucoses.  Will start at about 0.05 units/kg/meal.  Discussed the action, peak, and duration of Novolog insulin.  Instructed patient on the use of an insulin pen including: attaching a pen needle, priming the pen, administering the dose, removing the pen needle, and importance of using a new pen needle for each injection. Instructed patient to administer insulin into fat tissue on the sides of her abdomen using a new site for each injection.    Plan/Response:  Patient Instructions   Test glucose 4 times " per day:              Fasting (when you first awake for the day): 95 mg/dL or below              1 hour after breakfast: 140 mg/dL or below              1 hour after lunch: 140 mg/dL or below              1 hour after dinner: 140 mg/dL or below                 Please bring your meter and log book to all appointments                 If you miss 1 hour after meal test, test 2 hours after the meal.  Goal 2 hours after is 120 mg/dL or below.      2.  Check your urine ketones once a day, when you first awake for the day until they are negative to trace for 7 days in a row.  Then decrease and check once a week.      3.  Aim for 20-30 minutes of activity most days of the week (with the okay of your OB provider).       4.  Increase NPH insulin to 28 units daily at bedtime.    5.  Start Novolog as 6 units before breakfast and 6 units before supper.  Take about 5-15 minutes before the meal    You can watch a short video about using the insulin pen here: https://www."[employer].org/device/novolog-flexpen/     6.  Keep something with you for treating a low blood sugar (below 60-80 mg/dL).  If your blood sugar is low, eat/drink ONE of the following:   -1/2 cup juice  -1/2 cup regular soda  -1 package fruit snacks  -4 glucose tablets  Then, wait 15 minutes and re-check blood sugar.  If it is not above 70 mg/dL, repeat the above.     7.  Follow up: Thursday, March 6th at 12:30 PM virtually     8.  Call Diabetes Education at [phone] or send a [employer] message with:              -questions or concerns              -ketones that are small, moderate, or large              -3 or more blood sugars above target in a 7 day period              -any low blood sugars    Slime Kramer, MPH, RD, CDCES, LD 3/3/2025    Time Spent: 24 minutes    Any diabetes medication dose changes were made via the CDE Protocol and Collaborative Practice Agreement with the patient's referring provider. A copy of this encounter was shared with the  provider.

## 2025-03-03 NOTE — PATIENT INSTRUCTIONS
Test glucose 4 times per day:              Fasting (when you first awake for the day): 95 mg/dL or below              1 hour after breakfast: 140 mg/dL or below              1 hour after lunch: 140 mg/dL or below              1 hour after dinner: 140 mg/dL or below                 Please bring your meter and log book to all appointments                 If you miss 1 hour after meal test, test 2 hours after the meal.  Goal 2 hours after is 120 mg/dL or below.      2.  Check your urine ketones once a day, when you first awake for the day until they are negative to trace for 7 days in a row.  Then decrease and check once a week.      3.  Aim for 20-30 minutes of activity most days of the week (with the okay of your OB provider).       4.  Increase NPH insulin to 28 units daily at bedtime.    5.  Start Novolog as 6 units before breakfast and 6 units before supper.  Take about 5-15 minutes before the meal    You can watch a short video about using the insulin pen here: https://www.[employer].org/device/novolog-flexpen/     6.  Keep something with you for treating a low blood sugar (below 60-80 mg/dL).  If your blood sugar is low, eat/drink ONE of the following:   -1/2 cup juice  -1/2 cup regular soda  -1 package fruit snacks  -4 glucose tablets  Then, wait 15 minutes and re-check blood sugar.  If it is not above 70 mg/dL, repeat the above.     7.  Follow up: Thursday, March 6th at 12:30 PM virtually     8.  Call Diabetes Education at [phone] or send a [employer] message with:              -questions or concerns              -ketones that are small, moderate, or large              -3 or more blood sugars above target in a 7 day period              -any low blood sugars

## 2025-03-03 NOTE — TELEPHONE ENCOUNTER
Patient sent message re: glucose.  Please note this is different than glucose reported to writer during visit dated 3/3/25.  Please see visit encounter dated 3/3/25 for assessment.   Slime Kramer, MPH, RD, CDCES, LD 3/3/2025

## 2025-03-04 ENCOUNTER — ANCILLARY PROCEDURE (OUTPATIENT)
Dept: ULTRASOUND IMAGING | Facility: CLINIC | Age: 30
End: 2025-03-04
Attending: OBSTETRICS & GYNECOLOGY
Payer: COMMERCIAL

## 2025-03-04 ENCOUNTER — MYC MEDICAL ADVICE (OUTPATIENT)
Dept: EDUCATION SERVICES | Facility: CLINIC | Age: 30
End: 2025-03-04

## 2025-03-04 DIAGNOSIS — O24.414 INSULIN CONTROLLED GESTATIONAL DIABETES MELLITUS (GDM) IN THIRD TRIMESTER: Primary | ICD-10-CM

## 2025-03-04 DIAGNOSIS — O99.213 OBESITY AFFECTING PREGNANCY IN THIRD TRIMESTER, UNSPECIFIED OBESITY TYPE: ICD-10-CM

## 2025-03-04 DIAGNOSIS — O24.410 DIET CONTROLLED GESTATIONAL DIABETES MELLITUS (GDM) IN SECOND TRIMESTER: ICD-10-CM

## 2025-03-04 PROCEDURE — 76819 FETAL BIOPHYS PROFIL W/O NST: CPT

## 2025-03-05 ENCOUNTER — MYC MEDICAL ADVICE (OUTPATIENT)
Dept: OBGYN | Facility: CLINIC | Age: 30
End: 2025-03-05
Payer: COMMERCIAL

## 2025-03-06 ENCOUNTER — MYC MEDICAL ADVICE (OUTPATIENT)
Dept: EDUCATION SERVICES | Facility: CLINIC | Age: 30
End: 2025-03-06

## 2025-03-06 ENCOUNTER — VIRTUAL VISIT (OUTPATIENT)
Dept: EDUCATION SERVICES | Facility: CLINIC | Age: 30
End: 2025-03-06
Payer: COMMERCIAL

## 2025-03-06 DIAGNOSIS — O24.414 INSULIN CONTROLLED GESTATIONAL DIABETES MELLITUS (GDM) IN THIRD TRIMESTER: ICD-10-CM

## 2025-03-06 DIAGNOSIS — O24.410 DIET CONTROLLED GESTATIONAL DIABETES MELLITUS (GDM) IN SECOND TRIMESTER: ICD-10-CM

## 2025-03-06 RX ORDER — INSULIN ASPART 100 [IU]/ML
4 INJECTION, SOLUTION INTRAVENOUS; SUBCUTANEOUS
Qty: 15 ML | Refills: 1 | Status: SHIPPED | OUTPATIENT
Start: 2025-03-06

## 2025-03-06 NOTE — Clinical Note
Just a heads up that she has not yet started rapid acting insulin at meals due to an issue obtaining pen needles.

## 2025-03-06 NOTE — PROGRESS NOTES
Diabetes and Pregnancy Follow-up  Type of Service: Video Visit/ 26 minutes     Originating Location (Patient Location): Home  Distant Location (Provider Location): [facility]  Mode of Communication:  Video     Video Visit Start Time: 12:32 PM  Video Visit End Time: 12:58 PM     How would patient like to obtain AVS? Ashli      Subjective/Objective:    Tami Almaraz was called for a scheduled BG review. Last date of communication was: 3/5/25.    Gestational diabetes is being managed with medications    Taking diabetes medications: yes:     Diabetes Medication(s)       Insulin       insulin aspart (NOVOLOG FLEXPEN) 100 UNIT/ML pen Inject 6 Units subcutaneously 2 times daily (with meals). - before breakfast and before supper. NOT yet started     insulin  UNIT/ML vial Inject 28 Units subcutaneously at bedtime.            Estimated Date of Delivery: Apr 16, 2025    Blood Glucose/Ketone Log:    Date Ketones Fasting Post Breakfast Post Lunch Post Supper HS   3/3   199 133     3/4  116 125 274 164    3/5 large  Waffles, strawberries, syrup, water 158 two sandwiches (egg salad) on buns, juice  153 asparagus, orange juice, and  ?  Poptart   3/6 large 113       Unable to report relationship of glucoses of food to meals.    Assessment:  Ketones: large x2.   Fasting blood glucoses: 0% in target.  After breakfast: 50% in target.  Before lunch: n/a% in target.  After lunch: 33% in target.  Before dinner: n/a% in target.  After dinner: 0% in target.  Assessed post meal glucoses as if 1 hour post meals    Reports she was able to  4 Novolog pens earlier this week but not pen needles so she has not started taking the mealtime insulin.  Reports she checked with 2 other [facility] and pen needles were not available at any of them.  Sent pen needle prescription to [facility] during visit.  Called [facility] after visit and staff reports that prescribed pen needles not covered nor are the recommended alternative?!?  Writer asked  [facility] triage for assistance (see Medical Messaging encounter dated 3/6/25).     Reports she has dinner 6-9 PM and goes to bed by 10 PM.  Answered patient questions about urine ketones. Given large urine ketones, discussed having HS snack IF bedtime is 2 or more hours after dinner.  She has a lactose free milk that she plans to have for HS snack.     Instructed patient to increase NPH dose given elevated fasting glucoses.  Given post lunch numbers available today and she has not yet started Novolog, asked her to take Novolog as below (a total of about 0.1 units/kg/day if she has 3 meals). Discussed if she does not eat the meal, she should not take Novolog.    Plan/Response:  Patient Instructions   Test glucose 4 times per day:              Fasting (when you first awake for the day): 95 mg/dL or below              1 hour after breakfast: 140 mg/dL or below              1 hour after lunch: 140 mg/dL or below              1 hour after dinner: 140 mg/dL or below                 Please bring your meter and log book to all appointments                 If you miss 1 hour after meal test, test 2 hours after the meal.  Goal 2 hours after is 120 mg/dL or below.      2.  Check your urine ketones once a day, when you first awake for the day until they are negative to trace for 7 days in a row.  Then decrease and check once a week.      3.  Aim for 20-30 minutes of activity most days of the week (with the okay of your OB provider).      4.  Meal Planning:   -work to eliminate sugar sweetened beverages.  Choose: diet/zero soda, Minute Maid Zero Sugar juices, Crystal Light instead   -have a snack at bedtime  (if bedtime is 2 or more hours after dinner): 15 grams carbohydrate and at least 7 grams of protein     5.  Increase NPH insulin to 32 units daily at bedtime.     6.  Start Novolog as 4 units before each meal (up to a total of 3 times per day)  Take about 5-15 minutes before the meal     7.  Keep something with you for treating  a low blood sugar (below 60-80 mg/dL).  If your blood sugar is low, eat/drink ONE of the following:   -1/2 cup juice  -1/2 cup regular soda  -1 package fruit snacks  -4 glucose tablets  Then, wait 15 minutes and re-check blood sugar.  If it is not above 70 mg/dL, repeat the above.     8.  Follow up: Monday, March 10th at 10:15 AM virtually     9.  Call Diabetes Education at [phone] or send a Anexon message with:              -questions or concerns              -ketones that are small, moderate, or large              -3 or more blood sugars above target in a 7 day period              -any low blood sugars     Slime Kramer, MPH, RD, CDCES, LD 3/6/2025    Time Spent: 26 minutes    Any diabetes medication dose changes were made via the CDE Protocol and Collaborative Practice Agreement with the patient's referring provider. A copy of this encounter was shared with the provider.

## 2025-03-07 NOTE — PATIENT INSTRUCTIONS
Test glucose 4 times per day:              Fasting (when you first awake for the day): 95 mg/dL or below              1 hour after breakfast: 140 mg/dL or below              1 hour after lunch: 140 mg/dL or below              1 hour after dinner: 140 mg/dL or below                 Please bring your meter and log book to all appointments                 If you miss 1 hour after meal test, test 2 hours after the meal.  Goal 2 hours after is 120 mg/dL or below.      2.  Check your urine ketones once a day, when you first awake for the day until they are negative to trace for 7 days in a row.  Then decrease and check once a week.      3.  Aim for 20-30 minutes of activity most days of the week (with the okay of your OB provider).      4.  Meal Planning:   -work to eliminate sugar sweetened beverages.  Choose: diet/zero soda, Minute Maid Zero Sugar juices, Crystal Light instead   -have a snack at bedtime (if bedtime is 2 or more hours after dinner): 15 grams carbohydrate and at least 7 grams of protein     5.  Increase NPH insulin to 32 units daily at bedtime.     6.  Start Novolog as 4 units before each meal (up to a total of 3 times per day)  Take about 5-15 minutes before the meal     7.  Keep something with you for treating a low blood sugar (below 60-80 mg/dL).  If your blood sugar is low, eat/drink ONE of the following:   -1/2 cup juice  -1/2 cup regular soda  -1 package fruit snacks  -4 glucose tablets  Then, wait 15 minutes and re-check blood sugar.  If it is not above 70 mg/dL, repeat the above.     8.  Follow up: Monday, March 10th at 10:15 AM virtually     9.  Call Diabetes Education at [phone] or send a Gaosouyi message with:              -questions or concerns              -ketones that are small, moderate, or large              -3 or more blood sugars above target in a 7 day period              -any low blood sugars

## 2025-03-10 ENCOUNTER — VIRTUAL VISIT (OUTPATIENT)
Dept: EDUCATION SERVICES | Facility: CLINIC | Age: 30
End: 2025-03-10
Payer: COMMERCIAL

## 2025-03-10 DIAGNOSIS — O24.414 INSULIN CONTROLLED GESTATIONAL DIABETES MELLITUS (GDM) IN THIRD TRIMESTER: Primary | ICD-10-CM

## 2025-03-10 PROCEDURE — 99207 PR NON-BILLABLE SERV PER CHARTING: CPT | Mod: 95

## 2025-03-10 NOTE — PATIENT INSTRUCTIONS
To Schedule an Appointment 24/7  Call: [phone]    If you have any questions regarding your visit, Please contact your care team.  [facility]: [phone]  [facility] CLINIC HOURS TELEPHONE NUMBER   Cephas Agbeh, M.D.      Kamilah Zendejas-Surgery Scheduler  Lissy-Surgery Scheduler       Monday - [place]:    8:00 am-4:45 pm  Tuesday - [place]:   8:00 am-4:45 pm  Friday-[place]:       8:00 am-4:45 pm  Typical Surgery Day:  Wednesday [facility]   [address] [place] [address]   [phone]   Fax [phone]    Imaging Scheduling all locations  [phone]      [facility]   [address] [place] [address] [place] [address]   [phone]    [facility]  [address] [facility] [address]  [phone]  Fax [phone]   Urgent Care locations:  [facility] Monday-Friday                               10 am - 8 pm  Saturday and Sunday                      9 am - 5 pm  Monday-Friday                              10 am- 8 pm  Saturday and Sunday                      9 am - 5 pm    [phone]   **Surgeries** Our Surgery Schedulers will contact you to schedule. If you do not receive a call within 3 business days, please call [phone].  If you need a medication refill, please contact your pharmacy. Please allow 3 business days for your refill to be completed.  As always, Thank you for trusting us with your healthcare needs!  see additional instructions from your care team below   Weeks 34 to 36 of Your Pregnancy: Care Instructions  Your belly has grown quite large. It's almost time to give birth! Your baby's lungs are almost ready to breathe air. The skull bones are firm enough to protect your baby's head. But they're soft enough to move down through the birth canal.    You might be wondering what to expect during labor. Because each  "birth is different, there's no way to know exactly what childbirth will be like for you. Talk to your doctor or midwife about any concerns you have.   You'll probably have a test for group B streptococcus (GBS). GBS is bacteria that can live in the vagina and rectum. GBS can make your baby sick after birth. If you test positive, you'll get antibiotics during labor.     Choose what type of pain relief you would like during labor.  You can choose from a few types, including medicine and non-medicine options. You may want to use several types of pain relief.     Know how medicines can help with pain during labor.  Some medicines lower anxiety and help with some of the pain. Others make your lower body numb so that you will feel less pain.     Tell your doctor about your pain medicine choice.  Do this before you start labor or very early in your labor. You may be able to change your mind during labor.     Learn about the stages of labor.    The first stage includes the early (latent) and active phases of labor. Contractions start in early labor. During active labor, contractions get stronger, last longer, and happen more often. And the cervix opens more rapidly.  The second stage starts when you're ready to push. During this stage, your baby is born.  During the third stage, you'll usually have a few more contractions to push out the placenta.   Follow-up care is a key part of your treatment and safety. Be sure to make and go to all appointments, and call your doctor if you are having problems. It's also a good idea to know your test results and keep a list of the medicines you take.  Where can you learn more?  Go to https://www.Powtoon.net/patiented  Enter B912 in the search box to learn more about \"Weeks 34 to 36 of Your Pregnancy: Care Instructions.\"  Current as of: April 30, 2024  Content Version: 14.3    2024 [employer].   Care instructions adapted under license by your healthcare professional. If " you have questions about a medical condition or this instruction, always ask your healthcare professional. [employer] disclaims any warranty or liability for your use of this information.    Group B Strep During Pregnancy: Care Instructions  Overview     Group B strep infection is caused by a type of bacteria. It's a different kind of bacteria than the kind that causes strep throat.  You may have this kind of bacteria in your body. Sometimes it may cause an infection, but most of the time it doesn't make you sick or cause symptoms. But if you pass the bacteria to your baby during the birth, it can cause serious health problems for your baby.  If you have this bacteria in your body, you will get antibiotics when you are in labor. Antibiotics help prevent problems for a  baby.  After birth, doctors will watch and may test your baby. If your baby tests positive for Group B strep, your baby will get antibiotics.  If you plan to breastfeed your baby, don't worry. It will be safe to breastfeed.  Follow-up care is a key part of your treatment and safety. Be sure to make and go to all appointments, and call your doctor if you are having problems. It's also a good idea to know your test results and keep a list of the medicines you take.  How can you care for yourself at home?  If your doctor has prescribed antibiotics, take them as directed. Do not stop taking them just because you feel better. You need to take the full course of antibiotics.  Tell your doctor if you are allergic to any antibiotic.  If you go into labor, or your water breaks, go to the hospital. Your doctor will give you antibiotics to help protect your baby from infection.  Tell the doctors and nurses if you have an allergy to penicillin.  Tell the doctors and nurses at the hospital that you tested positive for group B strep.  When should you call for help?   Call your doctor now or seek immediate medical care if:    You have symptoms of  "a urinary tract infection. These may include:  Pain or burning when you urinate.  A frequent need to urinate without being able to pass much urine.  Pain in the flank, which is just below the rib cage and above the waist on either side of the back.  Blood in your urine.  A fever.     You think you are in labor or your water has broken.     You have pain in your belly or pelvis.   Watch closely for changes in your health, and be sure to contact your doctor if you have any problems.  Where can you learn more?  Go to https://www.Quantifind.net/patiented  Enter M001 in the search box to learn more about \"Group B Strep During Pregnancy: Care Instructions.\"  Current as of: April 30, 2024  Content Version: 14.3    2024 [employer].   Care instructions adapted under license by your healthcare professional. If you have questions about a medical condition or this instruction, always ask your healthcare professional. [employer] disclaims any warranty or liability for your use of this information.    "

## 2025-03-10 NOTE — Clinical Note
Please review my note from today.  Patient hung up, did not provide glucoses, and has no further follow up with diabetes education at this time.  Thank you,  Slime Kramer, MPH, RD, CDCES, LD 3/10/2025

## 2025-03-10 NOTE — PROGRESS NOTES
"Diabetes and Pregnancy Follow-up  Type of Service: Telephone Visit/ 4 minutes     Originating Location (Patient Location): unknown  Distant Location (Provider Location): home - [facility]  Mode of Communication:  Telephone     Telephone Visit Start Time: 10:25 AM  Telephone Visit End Time (telephone visit stop time): 10:29 AM     How would patient like to obtain AVS? Ashli      Subjective/Objective:    Tami Almaraz was called for a scheduled BG review. Last date of communication was: 3/7/25.    Gestational diabetes is being managed with medications    Taking diabetes medications: yes:     Diabetes Medication(s)       Insulin       insulin aspart (NOVOLOG FLEXPEN) 100 UNIT/ML pen Inject 4 Units subcutaneously 3 times daily (with meals). Reports starting on Saturday, 3/8/25     insulin  UNIT/ML vial Inject 32 Units subcutaneously at bedtime. Reports she missed last night as she misplaced the vial            Estimated Date of Delivery: Apr 16, 2025    Blood Glucose/Ketone Log:    Date Ketones Fasting Post Breakfast Post Lunch Post Supper   3/10/25  94 (patient believes)        Assessment:  Writer connected to video visit 2 minutes after scheduled visit start time and patient had already disconnected.  Writer reached patient via phone and patient stated \"no one showed up\" in regards to video visit. Discussed writer's arrival at 10:17 AM and patient stated visit start was 10:10 AM; visit start scheduled at 10:15 AM.  Discussed difference between patient arrival time (5 minutes before scheduled visit) and visit time.     Since patient reporting she is unable to complete visit, suggested she send glucoses for review/assessment or reschedule visit.  Patient did not respond.  Patient provided the above information.  Needs to obtain a new vial of NPH.  Discussed NPH prescription at [facility]'s pharmacy and she would need to ask them to fill it at which patient reported that it will not be available there and she is " not going to [place] to get it.  Offered to send to preferred pharmacy or discuss other options and patient hung up on writer.     Plan/Response:  Send in glucoses and urine ketones from 3/8 to present.   Reschedule appointment with CDCES: should be within the next 3 days.   Obtain new vial of NPH:   -prescription currently at [facility] ([address])   -let us know if you would like sent to a new pharmacy  -can purchase of NPH insulin over the counter at any [facility] for about $25 (no prescription needed)  4.  Call Diabetes Education at [phone] or send a Kabooza message with:              -questions or concerns              -ketones that are small, moderate, or large              -3 or more blood sugars above target in a 7 day period              -any low blood sugars    Time Spent: 4 minutes (no charge)    Any diabetes medication dose changes were made via the CDE Protocol and Collaborative Practice Agreement with the patient's referring provider. A copy of this encounter was shared with the provider.

## 2025-03-11 ENCOUNTER — MYC MEDICAL ADVICE (OUTPATIENT)
Dept: OBGYN | Facility: CLINIC | Age: 30
End: 2025-03-11

## 2025-03-11 ENCOUNTER — PRENATAL OFFICE VISIT (OUTPATIENT)
Dept: OBGYN | Facility: CLINIC | Age: 30
End: 2025-03-11
Payer: COMMERCIAL

## 2025-03-11 ENCOUNTER — NURSE TRIAGE (OUTPATIENT)
Dept: NURSING | Facility: CLINIC | Age: 30
End: 2025-03-11

## 2025-03-11 ENCOUNTER — ANCILLARY PROCEDURE (OUTPATIENT)
Dept: ULTRASOUND IMAGING | Facility: CLINIC | Age: 30
End: 2025-03-11
Attending: OBSTETRICS & GYNECOLOGY
Payer: COMMERCIAL

## 2025-03-11 VITALS
BODY MASS INDEX: 44.06 KG/M2 | WEIGHT: 289.8 LBS | HEART RATE: 104 BPM | OXYGEN SATURATION: 95 % | DIASTOLIC BLOOD PRESSURE: 74 MMHG | SYSTOLIC BLOOD PRESSURE: 121 MMHG

## 2025-03-11 DIAGNOSIS — O24.410 DIET CONTROLLED GESTATIONAL DIABETES MELLITUS (GDM) IN SECOND TRIMESTER: ICD-10-CM

## 2025-03-11 DIAGNOSIS — O24.414 INSULIN CONTROLLED GESTATIONAL DIABETES MELLITUS (GDM) IN THIRD TRIMESTER: ICD-10-CM

## 2025-03-11 DIAGNOSIS — O99.213 OBESITY AFFECTING PREGNANCY IN THIRD TRIMESTER, UNSPECIFIED OBESITY TYPE: Primary | ICD-10-CM

## 2025-03-11 DIAGNOSIS — O99.213 OBESITY AFFECTING PREGNANCY IN THIRD TRIMESTER, UNSPECIFIED OBESITY TYPE: ICD-10-CM

## 2025-03-11 PROCEDURE — 0502F SUBSEQUENT PRENATAL CARE: CPT | Performed by: OBSTETRICS & GYNECOLOGY

## 2025-03-11 PROCEDURE — 59025 FETAL NON-STRESS TEST: CPT | Performed by: OBSTETRICS & GYNECOLOGY

## 2025-03-11 PROCEDURE — 3078F DIAST BP <80 MM HG: CPT | Performed by: OBSTETRICS & GYNECOLOGY

## 2025-03-11 PROCEDURE — 76819 FETAL BIOPHYS PROFIL W/O NST: CPT | Performed by: RADIOLOGY

## 2025-03-11 PROCEDURE — 99207 PR PRENATAL VISIT: CPT | Performed by: OBSTETRICS & GYNECOLOGY

## 2025-03-11 PROCEDURE — 3074F SYST BP LT 130 MM HG: CPT | Performed by: OBSTETRICS & GYNECOLOGY

## 2025-03-11 NOTE — PROGRESS NOTES
"34w6d  No HA, visual changes, N/V etc. patient has no records of her blood sugars.  She says the diabetic educator is in the process of reordering some of her insulin medications.  Patient says she is sick and tired of poking the fingers for blood sugars.  She says her Dexcom 7 does not tend to stay on so she stopped using it.  Her NST today is reactive.  She is scheduled to have a biophysical profile done after this appointment.  I encouraged her to MyChart her blood sugar results to the diabetic educator so that her insulin doses can be adjusted appropriately.  I discussed with her that the risk of either very high blood sugars versus very low blood sugars due to inappropriate injection of insulin.  I gave her guidelines for which she should call or go to the emergency room if she is having any reaction for hypoglycemia or hyperglycemia.  Please see notes from the diabetic educator as below.. Routine AG. See flowsheet. RTC 1 wk/prn.     NST IS:  Reactive (2 accl > 15 BPM in 20 min., each lasting approx. 15 seconds)  NST Baseline Rate 150s  Variability:  Average  Accelerations:Present  Variable Decelerations:No  Other Decelerations:No  Vital signs:      BP: 121/74 Pulse: 104     SpO2: 95 %       Weight: 131.5 kg (289 lb 12.8 oz)  Estimated body mass index is 44.06 kg/m  as calculated from the following:    Height as of 6/14/24: 1.727 m (5' 8\").    Weight as of this encounter: 131.5 kg (289 lb 12.8 oz).        ICD-10-CM    1. Obesity affecting pregnancy in third trimester, unspecified obesity type  O99.213 FETAL NON-STRESS TEST      2. Insulin controlled gestational diabetes mellitus (GDM) in third trimester  O24.414 FETAL NON-STRESS TEST        CEPHAS AGBEH, MD.        Scarlett Kramer RD   Registered Dietitian  Specialty: Dietitian     Progress Notes  Signed     Encounter Date: 3/10/2025       Diabetes and Pregnancy Follow-up  Type of Service: Telephone Visit/ 4 minutes     Originating Location (Patient " "Location): unknown  Distant Location (Provider Location): home - [facility]  Mode of Communication:  Telephone     Telephone Visit Start Time: 10:25 AM  Telephone Visit End Time (telephone visit stop time): 10:29 AM     How would patient like to obtain AVS? Ashli        Subjective/Objective:     Tami Almaraz was called for a scheduled BG review. Last date of communication was: 3/7/25.     Gestational diabetes is being managed with medications     Taking diabetes medications: yes:     Diabetes Medication(s)               Insulin        insulin aspart (NOVOLOG FLEXPEN) 100 UNIT/ML pen Inject 4 Units subcutaneously 3 times daily (with meals). Reports starting on Saturday, 3/8/25      insulin  UNIT/ML vial Inject 32 Units subcutaneously at bedtime. Reports she missed last night as she misplaced the vial                Estimated Date of Delivery: Apr 16, 2025     Blood Glucose/Ketone Log:     Date Ketones Fasting Post Breakfast Post Lunch Post Supper   3/10/25   94 (patient believes)            Assessment:  Writer connected to video visit 2 minutes after scheduled visit start time and patient had already disconnected.  Writer reached patient via phone and patient stated \"no one showed up\" in regards to video visit. Discussed writer's arrival at 10:17 AM and patient stated visit start was 10:10 AM; visit start scheduled at 10:15 AM.  Discussed difference between patient arrival time (5 minutes before scheduled visit) and visit time.      Since patient reporting she is unable to complete visit, suggested she send glucoses for review/assessment or reschedule visit.  Patient did not respond.  Patient provided the above information.  Needs to obtain a new vial of NPH.  Discussed NPH prescription at [facility]'s pharmacy and she would need to ask them to fill it at which patient reported that it will not be available there and she is not going to [place] to get it.  Offered to send to preferred pharmacy or " discuss other options and patient hung up on writer.      Plan/Response:  Send in glucoses and urine ketones from 3/8 to present.   Reschedule appointment with CDCES: should be within the next 3 days.   Obtain new vial of NPH:   -prescription currently at [facility] ([address])   -let us know if you would like sent to a new pharmacy  -can purchase of NPH insulin over the counter at any [facility] for about $25 (no prescription needed)  4.  Call Diabetes Education at [phone] or send a Actus Digital message with:              -questions or concerns              -ketones that are small, moderate, or large              -3 or more blood sugars above target in a 7 day period              -any low blood sugars     Time Spent: 4 minutes (no charge)     Any diabetes medication dose changes were made via the CDE Protocol and Collaborative Practice Agreement with the patient's referring provider. A copy of this encounter was shared with the provider.                  Virtual Visit on 3/10/2025            Detailed Report

## 2025-03-12 ENCOUNTER — MYC MEDICAL ADVICE (OUTPATIENT)
Dept: EDUCATION SERVICES | Facility: CLINIC | Age: 30
End: 2025-03-12
Payer: COMMERCIAL

## 2025-03-12 ENCOUNTER — TELEPHONE (OUTPATIENT)
Dept: OBGYN | Facility: CLINIC | Age: 30
End: 2025-03-12
Payer: COMMERCIAL

## 2025-03-12 DIAGNOSIS — O24.410 DIET CONTROLLED GESTATIONAL DIABETES MELLITUS (GDM) IN SECOND TRIMESTER: ICD-10-CM

## 2025-03-12 RX ORDER — SYRINGE-NEEDLE,INSULIN,0.5 ML 27GX1/2"
SYRINGE, EMPTY DISPOSABLE MISCELLANEOUS
Qty: 50 EACH | Refills: 1 | Status: SHIPPED | OUTPATIENT
Start: 2025-03-12 | End: 2025-03-13

## 2025-03-12 RX ORDER — SYRINGE-NEEDLE,INSULIN,0.5 ML 27GX1/2"
SYRINGE, EMPTY DISPOSABLE MISCELLANEOUS
Qty: 50 EACH | Refills: 1 | Status: SHIPPED | OUTPATIENT
Start: 2025-03-12 | End: 2025-03-12

## 2025-03-12 NOTE — TELEPHONE ENCOUNTER
[facility] 6mm syringes are perfect. They don't have the 0.5 mL size in stock, so will need to order and it may take a few days. I thought through other options including having pt go to a different pharmacy, but that has been an issue for her in the past.    I will copy Nunu GREWAL on this message since she has been working with pt and might have another idea.    Jazz Granados RN, [facility]

## 2025-03-12 NOTE — TELEPHONE ENCOUNTER
Health Call Center    Phone Message    May a detailed message be left on voicemail: yes     Reason for Call: Medication Question or concern regarding medication   Prescription Clarification  Name of Medication:   insulin   Prescribing Provider: Cephas Mawuena Agbeh    Pharmacy: [facility] [address] [place] [address]    What on the order needs clarification? Pharmacy:  prescribed 8mm seringes, they dont have 8mm, they have 6. Wondering if this is ok instead.   Please call/update. Thank you      Action Taken: Message routed to:  [facility] p [phone]    Travel Screening: Not Applicable

## 2025-03-12 NOTE — TELEPHONE ENCOUNTER
Ordered NPH/syringes again as ASAP order to [facility] in [place]. Told pt to send message ASAP if she wants it sent somewhere else.     Nunu Swan RD, LD, [facility]

## 2025-03-12 NOTE — TELEPHONE ENCOUNTER
"OB Triage Call      Is patient's OB/Midwife with the formerly [facility]? [facility]- Is patient's primary OB a Midwife? No- Proceed with triage.     Reason for call: Insulin NPH insulin  yesterday, and hadn't taken it since Saturday and didn't get from pharmacy.   two hours after dinner.  Urine ketones negative. Blood sugars high.    Assessment: Patient has been vomiting 1-2 times a day for last three days, and has been this evening for two hours. Does keep some fluids down and is urinating.  Denies abdominal pain, contractions, vaginal bleeding. Ankle swelling and left side is more swollen than the right.  Patient denies taking her Novolog insulin.  She should take a dose with a late meal.    Plan: See within four hours/ED    Patient plans to deliver at [place]    Patient's primary OB Provider is Dr. Agbeh.    Per protocol recommendations: See within four hours; will need ED     Is patient's delivering hospital on divert? Does not apply due to ED      34w6d    Estimated Date of Delivery: 2025        OB History    Para Term  AB Living   4 1 1 0 2 1   SAB IAB Ectopic Multiple Live Births   1 1 0 0 1      # Outcome Date GA Lbr Alex/2nd Weight Sex Type Anes PTL Lv   4 Current            3 IAB      IAB      2 SAB 18 9w6d    SAB      1 Term 17 40w2d 11:28 / 00:15 2.665 kg (5 lb 14 oz) F Vag-Spont IV, EPI N KOJO      Name: KATHIA,BABY GIRL      Apgar1: 8  Apgar5: 9       No results found for: \"GBS\"       Mayuri Kent RN  [facility]      Reason for Disposition   [1] Thigh, calf, or ankle swelling AND [2] bilateral AND [3] 1 side is more swollen    Additional Information   Negative: Unconscious or difficult to awaken   Negative: Acting confused (e.g., disoriented, slurred speech)   Negative: Very weak (e.g., can't stand)   Negative: Sounds like a life-threatening emergency to the triager   Negative: [1] Vomiting AND [2] signs of dehydration " (e.g., very dry mouth, lightheaded, dark urine)   Negative: SEVERE difficulty breathing (e.g., struggling for each breath, speaks in single words)   Negative: Sounds like a life-threatening emergency to the triager   Negative: SEVERE leg swelling (e.g., swelling extends above knee, entire leg is swollen)   Negative: Chest pain   Negative: [1] Difficulty breathing AND [2] new-onset or worsening   Negative: [1] Pregnant 20 or more weeks AND [2] new blurred vision or vision changes   Negative: [1] Pregnant 20 or more weeks AND [2] severe headache AND [3] not relieved with acetaminophen (e.g., Tylenol)   Negative: [1] Pregnant 20 or more weeks AND [2] upper abdominal pain lasts > 1 hour   Negative: [1] Pregnant 23 or more weeks AND [2] baby is moving less today (e.g., kick count < 5 in 1 hour or < 10 in 2 hours)   Negative: [1] Red area or streak AND [2] fever   Negative: [1] Swelling is painful to touch AND [2] fever   Negative: [1] Cast on leg or ankle AND [2] now increased pain   Negative: Patient sounds very sick or weak to the triager   Negative: [1] Pregnant 20 or more weeks AND [2] swelling of face, arm or hands  (Exception: Slight puffiness of fingers.)   Negative: [1] Pregnant 20 or more weeks AND [2] sudden weight gain (e.g., more than 3 lbs or 1.4 kg in one week)   Negative: [1] Pregnant 20 or more weeks AND [2] Systolic BP >= 140 OR Diastolic BP >= 90   Negative: [1] Thigh or calf pain AND [2] only 1 side AND [3] present > 1 hour   Negative: [1] Thigh, calf, or ankle swelling AND [2] only 1 side   Negative: [1] Blood glucose > 240 mg/dL (13.3 mmol/L) AND [2] rapid breathing   Negative: Blood glucose > 500 mg/dL (27.8 mmol/L)   Negative: [1] Blood glucose > 240 mg/dL (13.3 mmol/L) AND [2] urine ketones moderate-large (or more than 1+)   Negative: [1] Blood glucose > 240 mg/dL (13.3 mmol/L) AND [2] blood ketones > 1.4 mmol/L   Negative: [1] Blood glucose > 240 mg/dL (13.3 mmol/L) AND [2] vomiting AND [3]  unable to check for ketones (in blood or urine)   Negative: [1] New-onset diabetes suspected (e.g., frequent urination, weak, weight loss) AND [2] vomiting or rapid breathing   Negative: Vomiting lasts > 4 hours   Negative: Patient sounds very sick or weak to the triager   Negative: Fever > 100.4 F (38.0 C)   Negative: Blood glucose > 400 mg/dL (22.2 mmol/L)   Negative: [1] Blood glucose > 300 mg/dL (16.7 mmol/L) AND [2] two or more times in a row   Negative: Urine ketones moderate - large (or blood ketones > 1.4 mmol/L)   Negative: [1] Symptoms of high blood sugar (e.g., abnormally thirsty, frequent urination, weight loss) AND [2] not able to test blood glucose AND [3] pregnant   Negative: [1] Caller has URGENT medication or insulin pump question AND [2] triager unable to answer question    Protocols used: Diabetes - High Blood Sugar-A-AH, Pregnancy - Leg Swelling and Edema-A-AH

## 2025-03-12 NOTE — TELEPHONE ENCOUNTER
Spoke to Tami, [facility] also doesn't have NPH vials. Sent RX to [facility] in [place], she will call to verify they have the insulin but suspect they will since it is OTC. She asked about taking bolus for elevated BG. I let her know this is out of our scope of practice and she will need to reach out to OB for this if desired. She is not sure what her BG is right now.     We have follow-up scheduled on Friday @ 11am.    Nunu Swan RD, LD, [facility]

## 2025-03-12 NOTE — Clinical Note
Per patient he is using the sliding scale as follows: If blood sugar is under 150, then no insulin. He uses 1 unit for every 50 points after 150. He tests his blood sugars 4 times a day. Prescription sent to pharmacy.    They have to order the syringes in. They do have the 0.3mL, but her dose is 32. Not sure if you have other ideas/thoughts, but I told them to order in the 0.5 mL.  Jazz Granados RN, [facility]

## 2025-03-13 RX ORDER — SYRINGE-NEEDLE,INSULIN,0.5 ML 27GX1/2"
SYRINGE, EMPTY DISPOSABLE MISCELLANEOUS
Qty: 50 EACH | Refills: 1 | Status: SHIPPED | OUTPATIENT
Start: 2025-03-13 | End: 2025-03-13

## 2025-03-13 RX ORDER — SYRINGE-NEEDLE,INSULIN,0.5 ML 27GX1/2"
SYRINGE, EMPTY DISPOSABLE MISCELLANEOUS
Qty: 50 EACH | Refills: 1 | Status: SHIPPED | OUTPATIENT
Start: 2025-03-13

## 2025-03-13 NOTE — TELEPHONE ENCOUNTER
Called multiple pharmacies today and was able to find a bottle of Humulin N at [facility] in [place] ([place]). I transferred her orders there and then they realized the vial was . Transferred RX back to [facility] in [place]. They may the insulin today and syringes tomorrow. There is an option to get NPH pens (5 total) from [facility] for $42 today if she is open to covering cost.     Attempted to call her to discuss, no answer so left VM and also sent mychart.    Nunu Swan RD, LD, [facility]

## 2025-03-17 ENCOUNTER — MYC MEDICAL ADVICE (OUTPATIENT)
Dept: EDUCATION SERVICES | Facility: CLINIC | Age: 30
End: 2025-03-17
Payer: COMMERCIAL

## 2025-03-17 NOTE — TELEPHONE ENCOUNTER
Gestational Diabetes Follow-up    Subjective/Objective:    Tami Almaraz sent in blood glucose log for review. Last date of communication was: 3/14-had phone visit.    Gestational diabetes is being managed with diet, activity, and medications    Taking diabetes medications: yes:     Diabetes Medication(s)       Insulin       insulin aspart (NOVOLOG FLEXPEN) 100 UNIT/ML pen Inject 4 Units subcutaneously 3 times daily (with meals).     insulin  UNIT/ML vial Inject 32 Units subcutaneously at bedtime.        *Has not had NPH for > 1 week    Estimated Date of Delivery: Apr 16, 2025    BG/Food Log:   Saturday wake up: 104   Breakfast/lunch: 132  Dinner:121  Bedtime:183     Sunday wake up:121( I drank milk for heart burn before bed)  Breakfast/lunch: 183 (went out to a restaurant)  Dinner:112  Bedtime: 176     Monday wake up:118(I drank milk for heart burn before bed)    Assessment:Fasting BG above goals because she has not had NPH in greater than 1 week. I personally called 5+ pharmacies last week looking for NPH as it was out at multiple locations. Finally found some on Friday at [facility] at [place] and discussed with Tami at her appt on 3/14 that it was there being filled and she should . Will stress again today the importance of picking up insulin and starting ASAP.     Ketones: negative.   Fasting blood glucoses: 0% in target.  After breakfast/lunch: 50% in target-meal that was over noted eating out  Before lunch: -% in target.  After lunch: -% in target.  Before dinner: -% in target.  After dinner: 100% in target.    Plan/Response:  Follow-up in 3-4 days.    Nunu Swan RD, NATHALY, [facility]      Any diabetes medication dose changes were made via the CDE Protocol and Collaborative Practice Agreement with the patient's OB/GYN provider. A copy of this encounter was shared with the provider.

## 2025-03-19 ENCOUNTER — TELEPHONE (OUTPATIENT)
Dept: OBGYN | Facility: CLINIC | Age: 30
End: 2025-03-19
Payer: COMMERCIAL

## 2025-03-19 NOTE — TELEPHONE ENCOUNTER
, 36w0d.    Pt is calling in with back pain.    She states she has been experiencing back pain  since last night.  This pain is intermittent, happening 1-2 times an hour.    Denies abdominal cramping, vaginal bleeding, LOF, decreased fetal movement.  She does feel some pelvic pressure only when she is having back pain.    Denies constipation.    Pt is also experiencing loss of appetite.    Advised to empty bladder, push fluids and rest.  If back pain resolves then okay to monitor.  However, if back pain persists or worsens despite pushing fluids, rest and emptying bladder then should be further evaluated.    Pt verbalized understanding and agreed to plan.    Melina Leon, [facility]

## 2025-03-19 NOTE — TELEPHONE ENCOUNTER
Caller reporting the following red-flag symptom(s): severe backache and nausea, GA: 36 weeks     Per the system red-flag symptom policy, patient was instructed to:  speak with a Registered Nurse    Action:  Patient warm transferred to a Registered Nurse

## 2025-04-06 ENCOUNTER — MYC MEDICAL ADVICE (OUTPATIENT)
Dept: OBGYN | Facility: CLINIC | Age: 30
End: 2025-04-06
Payer: COMMERCIAL

## 2025-04-13 ENCOUNTER — TELEPHONE (OUTPATIENT)
Dept: OBGYN | Facility: OTHER | Age: 30
End: 2025-04-13
Payer: COMMERCIAL

## 2025-04-13 NOTE — TELEPHONE ENCOUNTER
Patient was admitted Thursday night with severe preeclampsia and received Magnesium Sulfate.  She initially did not tolerate procardia, so was started on Labetalol.  BPs were still not well controlled so she was given procardia xl 30 mg and this time did well with it, with a good response with the blood pressure.     She was discharged home on Sunday with labetalol 400 mg TID and procardia xl 30 mg daily.      RN - please call patient to have her come in for a BP check Monday afternoon or Tuesday.  She was seeing Dr.Agbeh in pregnancy, but should be able to be a nurse only visit in  Monday.  She should be taking BPs at home and should have a log.  If her BPs are too low, the labetalol may need to be titrated down, but continue the procardia.

## 2025-04-14 ENCOUNTER — NURSE TRIAGE (OUTPATIENT)
Dept: NURSING | Facility: CLINIC | Age: 30
End: 2025-04-14
Payer: COMMERCIAL

## 2025-04-14 ENCOUNTER — ALLIED HEALTH/NURSE VISIT (OUTPATIENT)
Dept: OBGYN | Facility: CLINIC | Age: 30
End: 2025-04-14
Payer: COMMERCIAL

## 2025-04-14 VITALS
SYSTOLIC BLOOD PRESSURE: 113 MMHG | DIASTOLIC BLOOD PRESSURE: 70 MMHG | HEART RATE: 88 BPM | RESPIRATION RATE: 16 BRPM | OXYGEN SATURATION: 98 %

## 2025-04-14 RX ORDER — LABETALOL HYDROCHLORIDE 400 MG/1
400 TABLET, FILM COATED ORAL 3 TIMES DAILY
COMMUNITY
Start: 2025-04-13

## 2025-04-14 RX ORDER — NIFEDIPINE 30 MG
30 TABLET, EXTENDED RELEASE ORAL
COMMUNITY
Start: 2025-04-14

## 2025-04-14 NOTE — TELEPHONE ENCOUNTER
RN called pt and assisted in scheduling appt in clinic.    Patient verbalized understanding and agreed to plan.     Sherry Salcedo RN on 4/14/2025 at 8:30 AM

## 2025-04-14 NOTE — TELEPHONE ENCOUNTER
Nurse Triage SBAR    Is this a 2nd Level Triage? YES, LICENSED PRACTITIONER REVIEW IS REQUIRED    Situation:  She took her blood pressure medication 1 hour prior to calling. 144/89 143/87, 104/109, 109/85. She had several readings that were not realistic, so she has been continuing to check her blood pressures. Patient was encouraged to stop checking her blood pressure during triage questions, and then checked it again 174/122 supine, 163/108 seated. She is asymptomatic. The pump on the blood pressure cuff is audible over the phone, and can be heard re-inflating, 165/107, seated. She feels that she is unable to apply the cuff correctly.    Background: Mom calling. She is 2 weeks postpartum, and was hospitalized on Thursday for pre-eclampsia. She was given blood pressure medications, but was late for both.    Assessment:  Possible post-partum hypertension, possible cuff malfunction.     Protocol Recommended Disposition:   See HCP Within 4 Hours (Or PCP Triage)    Recommendation: Does patient need to be seen again tonight.      Paged to provider    Does the patient meet one of the following criteria for ADS visit consideration? 16+ years old, with an [facility] PCP     TIP  Providers, please consider if this condition is appropriate for management at one of our Acute and Diagnostic Services sites.     If patient is a good candidate, please use dotphrase <dot>triageresponse and select Refer to ADS to document.    [facility] Provider consult indicated.  Reason for page: post-partum hypertension  Specialty Group number: [id number]   Specialty Group: OB- OB specific from [facility] group    Initial page sent to Dr. LAITH Allen by RN at 12:59 AM.     [facility] Provider, Dr. Allen, returning page to [facility] at 1:01 AM    Provider recommended plan of care: Make sure she took the labetolol, take another dose of the procardia. Check your blood pressure on your forearm instead of your upper arm. Bring your cuff  with you to the clinic when you come in so they can check for accuracy.    Provider Recommendation Follow Up:   Reached patient/caregiver. Informed of provider's recommendations. Patient verbalized understanding and agrees with the plan. Patient re-checked her blood pressure on her forearm, and it was 155/106.        Rain Patterson RN  April 14, 2025 1:09 AM      Reason for Disposition   Systolic BP >= 140 OR Diastolic >= 90    Additional Information   Negative: Difficult to awaken or acting confused (e.g., disoriented, slurred speech)   Negative: SEVERE difficulty breathing (e.g., struggling for each breath, speaks in single words)   Negative: [1] Weakness of the face, arm or leg on one side of the body AND [2] new-onset   Negative: [1] Numbness (i.e., loss of sensation) of the face, arm or leg on one side of the body AND [2] new- onset   Negative: Seizure occurred and has stopped   Negative: Sounds like a life-threatening emergency to the triager   Negative: Other significant medical symptom is present, see that guideline (e.g., abdominal pain, chest pain, headache, leg swelling, vision changes)   Negative: [1] High blood pressure AND [2] NOT postpartum or pregnant   Negative: New hand or face swelling   Negative: Systolic BP >= 160 OR Diastolic >= 110   Negative: Patient sounds very sick or weak to the triager    Protocols used: Postpartum - High Blood Pressure-A-AH

## 2025-04-14 NOTE — PROCEDURES
BLOOD PRESSURE CHECK    Subjective:    Tami is being seen in clinic for a blood pressure check due to severe Postpartum preeclampsia. Patient is postpartum (delivery date:  3/27/2025    Patient reports taking the following antihypertensive medications: labetalol  and nifedipine      Labetalol 400MG TID  Nifedipine 30MG daily    Pt denies any headaches, vision changes or upper abdominal pain. Pt had episode of lightheadedness this AM following nifedipine dose but also did not eat this AM. Has felt better since, RN advised to have something to eat in the AM.    Home BP readings have been between 110s-low 140s/70s, pt states she had a reading last night in the 180s but was late on her medication, recheck and readings since have been normal.    Objective:    Patient Vitals for the past 24 hrs:   BP Pulse Resp SpO2   25 1507 113/70 -- -- --   25 1506 115/75 88 16 98 %        Two blood pressures were taken, at least one minute apart.     117/77 was reading with home BP cuff today in clinic.    Plan:    Blood pressure results are in the NORMAL (-139 and DBP 60-89) range of the ACOG Hypertensive Disorders of Pregnancy thresholds for pregnant and recently pregnant patients.    Reviewed with patient to call triage number if symptoms (chest pain, shortness of breath, visual changes, severe headache, lower extremity edema or right upper quadrant pain) occur. Visit encounter routed to provider.    Sherry Salcedo RN on 2025 at 3:23 PM

## 2025-04-15 ENCOUNTER — MYC MEDICAL ADVICE (OUTPATIENT)
Dept: OBGYN | Facility: CLINIC | Age: 30
End: 2025-04-15
Payer: COMMERCIAL

## 2025-04-15 NOTE — TELEPHONE ENCOUNTER
I do not have any openings in the office today.  You can schedule her with an incision check with the next available provider.  CEPHAS AGBEH, MD.    She may monitor at home until next appointment or be seen earlier if worsens she was discharged home recently with an intact incision.  If this is a new problem and is worsening she either be seen in the emergency room or see the next available provider.  CEPHAS AGBEH, MD.    
Pt had  on 3/25/2025, recently readmitted for severe preeclampsia in PP and discharged.    Pt has concern for lump at incision site, has slight pain with it, denies drainage of fluids or blood. States the lump is size of a quarter.    Pt denies fever, has had headaches and BP cuff broke- RN advised to  new cuff ASAP, increase water intake and update us with BP readings.    Bps in clinic yesterday were normal x3.     RN routing to provider regarding appt in clinic for incision concern.    Sherry Salcedo RN on 4/15/2025 at 12:04 PM    
I have personally seen and examined the patient. I have collaborated with and supervised the

## 2025-04-21 NOTE — PATIENT INSTRUCTIONS
If you have any questions regarding your visit, Please contact your care team.     [facility]: [phone]    To Schedule an Appointment 24/7  Call: [phone] TELEPHONE [facility]     Nicolas Ortiz MD  Medical Director        Sherry-[facility] Summers-RN  Cristel Zendejas-Surgery Scheduler  Lissy-Surgery Scheduler               Tuesday-[place]  7:30 a.m-4:30 p.m    Thursday-[place]  7:30 a.m-4:30 p.m    Typical Surgery Days: Tuesday or Friday [facility]  [address] [place] [address]  PH: [phone]    Imaging Scheduling all locations  PH: [phone]     [facility] Labor and Delivery  [address] [place] [address] [place] [address]  PH: [phone]    [facility]  [address] [place] [address]  PH: [phone] Fax      **Surgeries** Our Surgery Schedulers will contact you to schedule. If you do not receive a call within 3 business days, please call [phone].    Urgent Care locations:  [facility] Monday-Friday  10 am - 8 pm  Saturday and Sunday   9 am - 5 pm  Monday-Friday   10 am - 8 pm  Saturday and Sunday   9 am - 5 pm   [phone]   If you need a medication refill, please contact your pharmacy. Please allow 3 business days for your refill to be completed.  As always, Thank you for trusting us with your healthcare needs!    see additional instructions from your care team below

## 2025-04-23 ENCOUNTER — OFFICE VISIT (OUTPATIENT)
Dept: OBGYN | Facility: CLINIC | Age: 30
End: 2025-04-23
Payer: COMMERCIAL

## 2025-04-23 VITALS
BODY MASS INDEX: 39.09 KG/M2 | HEART RATE: 83 BPM | WEIGHT: 257.1 LBS | DIASTOLIC BLOOD PRESSURE: 63 MMHG | SYSTOLIC BLOOD PRESSURE: 95 MMHG | OXYGEN SATURATION: 98 %

## 2025-04-23 DIAGNOSIS — L08.9 WOUND INFECTION: Primary | ICD-10-CM

## 2025-04-23 DIAGNOSIS — T14.8XXA WOUND INFECTION: Primary | ICD-10-CM

## 2025-04-23 PROCEDURE — 3074F SYST BP LT 130 MM HG: CPT | Performed by: OBSTETRICS & GYNECOLOGY

## 2025-04-23 PROCEDURE — 99213 OFFICE O/P EST LOW 20 MIN: CPT | Performed by: OBSTETRICS & GYNECOLOGY

## 2025-04-23 PROCEDURE — 3078F DIAST BP <80 MM HG: CPT | Performed by: OBSTETRICS & GYNECOLOGY

## 2025-04-23 RX ORDER — CEPHALEXIN 500 MG/1
500 CAPSULE ORAL 4 TIMES DAILY
Qty: 28 CAPSULE | Refills: 0 | Status: SHIPPED | OUTPATIENT
Start: 2025-04-23

## 2025-04-23 NOTE — PROGRESS NOTES
Tami presents today for her post-operative check.    She complains of some tenderness in the left apex.  Denies any fever or chills, no drainage.    Incisions: well approximated, no drainage, and mild fullness and tender  BP 95/63 (BP Location: Right arm, Cuff Size: Adult Large)   Pulse 83   Wt 116.6 kg (257 lb 1.6 oz)   LMP 06/12/2024   SpO2 98%   Breastfeeding Yes   BMI 39.09 kg/m      Assessment:   2 week(s) status post C/S with probable late wound infection.  Plan: We will treat with Keflex 500 QID   Return in 2-4 weeks.  Nicolas Ortiz MD FACOG

## 2025-05-05 ENCOUNTER — MYC MEDICAL ADVICE (OUTPATIENT)
Dept: OBGYN | Facility: CLINIC | Age: 30
End: 2025-05-05
Payer: COMMERCIAL

## 2025-05-08 NOTE — PATIENT INSTRUCTIONS
To Schedule an Appointment 24/7  Call: [phone]    If you have any questions regarding your visit, Please contact your care team.  [facility]: [phone]  [facility] HOURS TELEPHONE NUMBER   Cephas Agbeh, M.D.      Kamilah Zendejas-Surgery Scheduler  Lissy-Surgery Scheduler       Monday - [place]:    8:00 am-4:45 pm  Tuesday - [place]:   8:00 am-4:45 pm  Friday-[place]:       8:00 am-4:45 pm  Typical Surgery Day:  Wednesday [facility]   [address] [place] [address]   [phone]   Fax [phone]    Imaging Scheduling all locations  [phone]      [facility] Labor and Delivery   [address] [place] [address] [place] [address]   [phone]    [facility]  [address] [facility] [address]  [phone]  Fax [phone]   Urgent Care locations:  [facility] Monday-Friday                               10 am - 8 pm  Saturday and Sunday                      9 am - 5 pm  Monday-Friday                              10 am- 8 pm  Saturday and Sunday                      9 am - 5 pm    [phone]   **Surgeries** Our Surgery Schedulers will contact you to schedule. If you do not receive a call within 3 business days, please call [phone].  If you need a medication refill, please contact your pharmacy. Please allow 3 business days for your refill to be completed.  As always, Thank you for trusting us with your healthcare needs!  see additional instructions from your care team below

## 2025-05-13 ENCOUNTER — PRENATAL OFFICE VISIT (OUTPATIENT)
Dept: OBGYN | Facility: CLINIC | Age: 30
End: 2025-05-13
Payer: COMMERCIAL

## 2025-05-13 VITALS
DIASTOLIC BLOOD PRESSURE: 85 MMHG | WEIGHT: 262.6 LBS | SYSTOLIC BLOOD PRESSURE: 123 MMHG | HEART RATE: 68 BPM | BODY MASS INDEX: 39.93 KG/M2 | OXYGEN SATURATION: 94 %

## 2025-05-13 DIAGNOSIS — O24.414 INSULIN CONTROLLED GESTATIONAL DIABETES MELLITUS (GDM) IN THIRD TRIMESTER: ICD-10-CM

## 2025-05-13 DIAGNOSIS — O99.213 OBESITY AFFECTING PREGNANCY IN THIRD TRIMESTER, UNSPECIFIED OBESITY TYPE: ICD-10-CM

## 2025-05-13 PROCEDURE — 3074F SYST BP LT 130 MM HG: CPT | Performed by: OBSTETRICS & GYNECOLOGY

## 2025-05-13 PROCEDURE — 99207 PR POST PARTUM EXAM: CPT | Performed by: OBSTETRICS & GYNECOLOGY

## 2025-05-13 PROCEDURE — 3079F DIAST BP 80-89 MM HG: CPT | Performed by: OBSTETRICS & GYNECOLOGY

## 2025-05-13 PROCEDURE — 0503F POSTPARTUM CARE VISIT: CPT | Performed by: OBSTETRICS & GYNECOLOGY

## 2025-05-13 ASSESSMENT — PATIENT HEALTH QUESTIONNAIRE - PHQ9: SUM OF ALL RESPONSES TO PHQ QUESTIONS 1-9: 11

## 2025-05-13 NOTE — PROGRESS NOTES
Tami is here for a 6-week postpartum checkup.    She had a   of a liveborn baby boy, weight 9 pounds 9 oz.  The delivery was  complicated by PIH and wound infection.  Since delivery, she has been breast feeding.  She has not had a normal menses.  She has not had intercourse.  Patient screened for postpartum depression and complaints are NEGATIVE. Screening has also been completed for intimate partner violence.   Her last pap was normal     EXAM: /85 (BP Location: Right arm, Patient Position: Sitting, Cuff Size: Adult Large)   Pulse 68   Wt 119.1 kg (262 lb 9.6 oz)   LMP 2024   SpO2 94%   Breastfeeding Yes   BMI 39.93 kg/m      HEENT: grossly normal.  NECK: no lymphadenopathy or thyromegaly.  ABDOMEN: soft, non tender, good bowel sounds, without masses, rebound, guarding or tenderness.  INC: well healed   EXTREMITIES: Warm to touch, no ankle edema or calf tenderness.    PELVIC:    External genitalia: normal without lesion,  perineum well healed   Vagina: normal mucosa and rugae, normal discharge.  Cervix: normal without lesion.  Uterus: small, mobile, nontender.  Adnexa: No masses, No tenderness  Rectal: deferred, external hemorrhoids absent    A/P  Routine Postpartum    ICD-10-CM    1. Routine postpartum follow-up  Z39.2 Gestational glucose tolerance testing (GTT), 2 hour      2. Preeclampsia in postpartum period  O14.95 Gestational glucose tolerance testing (GTT), 2 hour      3. Obesity affecting pregnancy in third trimester, unspecified obesity type  O99.213 Gestational glucose tolerance testing (GTT), 2 hour      4. Insulin controlled gestational diabetes mellitus (GDM) in third trimester  O24.414 Gestational glucose tolerance testing (GTT), 2 hour             She will complete her antihypertensive medications and check her blood pressures in 4 weeks..  She will schedule 2 hr. glucose tolerance test.        1. Contraception: condom  2. Annual due in  every 12 months    CEPHAS AGBEH,  MD.

## 2025-05-19 ENCOUNTER — MYC MEDICAL ADVICE (OUTPATIENT)
Dept: OBGYN | Facility: CLINIC | Age: 30
End: 2025-05-19
Payer: COMMERCIAL